# Patient Record
Sex: FEMALE | Race: ASIAN | NOT HISPANIC OR LATINO | Employment: UNEMPLOYED | URBAN - METROPOLITAN AREA
[De-identification: names, ages, dates, MRNs, and addresses within clinical notes are randomized per-mention and may not be internally consistent; named-entity substitution may affect disease eponyms.]

---

## 2017-02-06 DIAGNOSIS — F32.A DEPRESSION, UNSPECIFIED DEPRESSION TYPE: ICD-10-CM

## 2017-02-07 RX ORDER — CITALOPRAM HYDROBROMIDE 20 MG/1
20 TABLET ORAL DAILY
Qty: 60 TABLET | Refills: 0 | Status: SHIPPED | OUTPATIENT
Start: 2017-02-07 | End: 2017-05-08

## 2017-02-07 NOTE — TELEPHONE ENCOUNTER
PHQ-9 SCORE 8/14/2015 1/13/2016   Total Score 10 -   Total Score - 5     Refilled, but she is due for physica. Please have her schedule

## 2017-02-07 NOTE — TELEPHONE ENCOUNTER
Called spoke to patient scheduled for tomorrow 2/8/17 with Lora Garcia.    Thanks  Minor GORMAN  Team Coodinator

## 2017-02-07 NOTE — TELEPHONE ENCOUNTER
citalopram (CELEXA) 20 MG tablet     Last Written Prescription Date: 1/13/16  Per pharm last fill date: 9/12/16  Last Fill Quantity: 90, # refills: 1  Last Office Visit with G primary care provider: 10/25/2016       Last PHQ-9 score on record=   PHQ-9 SCORE 1/13/2016   Total Score -   Total Score 5         MALINI GreenT

## 2017-02-07 NOTE — TELEPHONE ENCOUNTER
Routing refill request to provider for review/approval because:  PHQ 9 >4, recommended 6 month visit 1/16. Please sign if ok.  Sydnee Peters, RN  Triage Nurse

## 2017-05-08 DIAGNOSIS — F32.A DEPRESSION, UNSPECIFIED DEPRESSION TYPE: ICD-10-CM

## 2017-05-08 NOTE — TELEPHONE ENCOUNTER
CITALOPRAM 20MG     Last Written Prescription Date: 2/7/2017  Last Fill Quantity: 60, # refills: 0  Last Office Visit with G primary care provider:  2/15/2017        Last PHQ-9 score on record=   PHQ-9 SCORE 1/13/2016   Total Score -   Total Score 5

## 2017-05-10 RX ORDER — CITALOPRAM HYDROBROMIDE 20 MG/1
20 TABLET ORAL DAILY
Qty: 30 TABLET | Refills: 0 | Status: SHIPPED | OUTPATIENT
Start: 2017-05-10 | End: 2017-08-22

## 2017-05-10 NOTE — TELEPHONE ENCOUNTER
LOV 10-25-16.  No show for 2-15-17 physical.  Please call to schedule appointment(nurse)  Do you want to fill again in the interim?  Kelle Jonas RN

## 2017-08-22 ENCOUNTER — OFFICE VISIT (OUTPATIENT)
Dept: PEDIATRICS | Facility: CLINIC | Age: 22
End: 2017-08-22
Payer: COMMERCIAL

## 2017-08-22 VITALS
DIASTOLIC BLOOD PRESSURE: 54 MMHG | WEIGHT: 96 LBS | OXYGEN SATURATION: 98 % | SYSTOLIC BLOOD PRESSURE: 88 MMHG | TEMPERATURE: 97.9 F | BODY MASS INDEX: 17.66 KG/M2 | HEART RATE: 60 BPM | HEIGHT: 62 IN

## 2017-08-22 DIAGNOSIS — M54.2 CERVICALGIA: ICD-10-CM
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
## 2017-08-22 PROCEDURE — 99214 OFFICE O/P EST MOD 30 MIN: CPT | Performed by: NURSE PRACTITIONER

## 2017-08-22 RX ORDER — CYCLOBENZAPRINE HCL 5 MG
5 TABLET ORAL 3 TIMES DAILY PRN
Qty: 42 TABLET | Refills: 0 | Status: SHIPPED | OUTPATIENT
Start: 2017-08-22 | End: 2017-09-14
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
## 2017-08-22 NOTE — PATIENT INSTRUCTIONS
400 mg ibuprofen THREE times per day with food.     Use the muscle relaxant as needed.     Expect a call to schedule with [facility]

## 2017-09-14 ENCOUNTER — OFFICE VISIT (OUTPATIENT)
Dept: PEDIATRICS | Facility: CLINIC | Age: 22
End: 2017-09-14
Payer: COMMERCIAL

## 2017-09-14 VITALS
SYSTOLIC BLOOD PRESSURE: 86 MMHG | TEMPERATURE: 98.3 F | WEIGHT: 94.2 LBS | DIASTOLIC BLOOD PRESSURE: 56 MMHG | OXYGEN SATURATION: 100 % | BODY MASS INDEX: 17.79 KG/M2 | HEIGHT: 61 IN | HEART RATE: 72 BPM

## 2017-09-14 DIAGNOSIS — J06.9 VIRAL UPPER RESPIRATORY TRACT INFECTION: ICD-10-CM

## 2017-09-14 DIAGNOSIS — T16.1XXA FOREIGN BODY IN EAR, RIGHT, INITIAL ENCOUNTER: Primary | ICD-10-CM

## 2017-09-14 PROCEDURE — 99213 OFFICE O/P EST LOW 20 MIN: CPT | Mod: 25 | Performed by: PHYSICIAN ASSISTANT

## 2017-09-14 PROCEDURE — 69200 CLEAR OUTER EAR CANAL: CPT | Performed by: PHYSICIAN ASSISTANT
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
## 2017-09-14 NOTE — PATIENT INSTRUCTIONS
With distilled water 2-3x per day for a minimum 2-3 days        Viral Upper Respiratory Illness (Adult)  You have a viral upper respiratory illness (URI), which is another term for the common cold. This illness is contagious during the first few days. It is spread through the air by coughing and sneezing. It may also be spread by direct contact (touching the sick person and then touching your own eyes, nose, or mouth). Frequent handwashing will decrease risk of spread. Most viral illnesses go away within 7 to 10 days with rest and simple home remedies. Sometimes the illness may last for several weeks. Antibiotics will not kill a virus, and they are generally not prescribed for this condition.    Home care    If symptoms are severe, rest at home for the first 2 to 3 days. When you resume activity, don't let yourself get too tired.    Avoid being exposed to cigarette smoke (yours or others ).    You may use acetaminophen or ibuprofen to control pain and fever, unless another medicine was prescribed. (Note: If you have chronic liver or kidney disease, have ever had a stomach ulcer or gastrointestinal bleeding, or are taking blood-thinning medicines, talk with your healthcare provider before using these medicines.) Aspirin should never be given to anyone under 18 years of age who is ill with a viral infection or fever. It may cause severe liver or brain damage.    Your appetite may be poor, so a light diet is fine. Avoid dehydration by drinking 6 to 8 glasses of fluids per day (water, soft drinks, juices, tea, or soup). Extra fluids will help loosen secretions in the nose and lungs.    Over-the-counter cold medicines will not shorten the length of time you re sick, but they may be helpful for the following symptoms: cough, sore throat, and nasal and sinus congestion. (Note: Do not use decongestants if you have high blood pressure.)  Follow-up care  Follow up with your healthcare provider, or as advised.  When to seek  "medical advice  Call your healthcare provider right away if any of these occur:    Cough with lots of colored sputum (mucus)    Severe headache; face, neck, or ear pain    Difficulty swallowing due to throat pain    Fever of 100.4 F (38 C)  Call 911, or get immediate medical care  Call emergency services right away if any of these occur:    Chest pain, shortness of breath, wheezing, or difficulty breathing    Coughing up blood    Inability to swallow due to throat pain  Date Last Reviewed: 9/13/2015 2000-2017 [employer]. [address] [place] [address]. All rights reserved. This information is not intended as a substitute for professional medical care. Always follow your healthcare professional's instructions.        Jadwin Diet  Your healthcare provider may recommend a bland diet if you have an upset stomach. It consists of foods that are mild and easy to digest. It is better to eat small frequent meals rather than 3 large meals a day.    Beverages  OK: Fruit juices, non-caffeinated teas and coffee, non-carbonated east  Avoid: Carbonated beverage, caffeinated tea and coffee, all alcoholic beverages  Bread  OK: Refined white, wheat or rye bread, norma or soda crackers, Kym toast, plain rolls, bagels  Avoid: Whole-grain bread  Cereal  OK: Refined cereals: cooked or ready to eat  Avoid: Whole-grain cereals and granola, or those containing bran, seeds or nuts  Desserts  OK: Peanut butter and all others except those to \"avoid\"  Avoid: Chocolate, cocoa, coconut, popcorn, nuts, seeds, jam, marmalade  Fruits  OK: Canned, cooked, frozen or fresh fruits without seeds or tough skin  Avoid: Olives, skin and seeds of fruit  Meats  OK: All fresh or preserved meat, fish and fowl  Avoid: Any that are prepared with those spices to \"avoid\"  Cheese and eggs  OK: Eggs, cottage cheese, cream cheese, other cheeses  Avoid: All cheeses made with those spices to \"avoid\"  Potatoes and pasta  OK: Potato, rice, " "macaroni, noodles, spaghetti  Avoid: None  Soups  OK: All soups without heavy seasoning  Avoid: Soups made with those spices to \"avoid\"  Vegetables  OK: Canned, cooked, fresh or frozen mildly flavored vegetables without seeds, skins or coarse fiber  Avoid: Vegetables prepared with those spices to \"avoid\"; skin and seeds of vegetables and those with coarse fiber  Spices  OK: Salt, lemon and lime juice, vinegar, all extracts, donna, cinnamon, thyme, mace, allspice, paprika  Avoid: Chili powder, cloves, pepper, seed spices, garlic, gravy pickles, highly seasoned salad dressings  Date Last Reviewed: 11/20/2015 2000-2017 [employer]. [address] [place] [address]. All rights reserved. This information is not intended as a substitute for professional medical care. Always follow your healthcare professional's instructions.        "

## 2017-09-14 NOTE — LETTER
[address]  [address]  [facility] [address]  Phone: [phone]  Fax: [phone]    September 14, 2017        Qian Loo  [address] [place] [id number]          To whom it may concern:    RE: Qian Loo    Patient was seen and treated today at our clinic.  I have recommended she stay home on 9/14, and return to work on 9/15/17 if feeling better.    Please contact me for questions or concerns.      Sincerely,        Obdulio Iqbal PA-C

## 2017-09-14 NOTE — MR AVS SNAPSHOT
After Visit Summary   9/14/2017    Qian Loo    MRN: [id number]           Patient Information     Date Of Birth          1995        Visit Information        Provider Department      9/14/2017 10:00 AM Obdulio Iqbal PA-C [facility]        Care Instructions      With distilled water 2-3x per day for a minimum 2-3 days        Viral Upper Respiratory Illness (Adult)  You have a viral upper respiratory illness (URI), which is another term for the common cold. This illness is contagious during the first few days. It is spread through the air by coughing and sneezing. It may also be spread by direct contact (touching the sick person and then touching your own eyes, nose, or mouth). Frequent handwashing will decrease risk of spread. Most viral illnesses go away within 7 to 10 days with rest and simple home remedies. Sometimes the illness may last for several weeks. Antibiotics will not kill a virus, and they are generally not prescribed for this condition.    Home care    If symptoms are severe, rest at home for the first 2 to 3 days. When you resume activity, don't let yourself get too tired.    Avoid being exposed to cigarette smoke (yours or others ).    You may use acetaminophen or ibuprofen to control pain and fever, unless another medicine was prescribed. (Note: If you have chronic liver or kidney disease, have ever had a stomach ulcer or gastrointestinal bleeding, or are taking blood-thinning medicines, talk with your healthcare provider before using these medicines.) Aspirin should never be given to anyone under 18 years of age who is ill with a viral infection or fever. It may cause severe liver or brain damage.    Your appetite may be poor, so a light diet is fine. Avoid dehydration by drinking 6 to 8 glasses of fluids per day (water, soft drinks, juices, tea, or soup). Extra fluids will help loosen secretions in the nose and lungs.    Over-the-counter cold  "medicines will not shorten the length of time you re sick, but they may be helpful for the following symptoms: cough, sore throat, and nasal and sinus congestion. (Note: Do not use decongestants if you have high blood pressure.)  Follow-up care  Follow up with your healthcare provider, or as advised.  When to seek medical advice  Call your healthcare provider right away if any of these occur:    Cough with lots of colored sputum (mucus)    Severe headache; face, neck, or ear pain    Difficulty swallowing due to throat pain    Fever of 100.4 F (38 C)  Call 911, or get immediate medical care  Call emergency services right away if any of these occur:    Chest pain, shortness of breath, wheezing, or difficulty breathing    Coughing up blood    Inability to swallow due to throat pain  Date Last Reviewed: 9/13/2015 2000-2017 [employer]. [address] [place] [address]. All rights reserved. This information is not intended as a substitute for professional medical care. Always follow your healthcare professional's instructions.        [place] Diet  Your healthcare provider may recommend a bland diet if you have an upset stomach. It consists of foods that are mild and easy to digest. It is better to eat small frequent meals rather than 3 large meals a day.    Beverages  OK: Fruit juices, non-caffeinated teas and coffee, non-carbonated east  Avoid: Carbonated beverage, caffeinated tea and coffee, all alcoholic beverages  Bread  OK: Refined white, wheat or rye bread, norma or soda crackers, [place] toast, plain rolls, bagels  Avoid: Whole-grain bread  Cereal  OK: Refined cereals: cooked or ready to eat  Avoid: Whole-grain cereals and granola, or those containing bran, seeds or nuts  Desserts  OK: Peanut butter and all others except those to \"avoid\"  Avoid: Chocolate, cocoa, coconut, popcorn, nuts, seeds, jam, marmalade  Fruits  OK: Canned, cooked, frozen or fresh fruits without seeds or tough " "skin  Avoid: Olives, skin and seeds of fruit  Meats  OK: All fresh or preserved meat, fish and fowl  Avoid: Any that are prepared with those spices to \"avoid\"  Cheese and eggs  OK: Eggs, cottage cheese, cream cheese, other cheeses  Avoid: All cheeses made with those spices to \"avoid\"  Potatoes and pasta  OK: Potato, rice, macaroni, noodles, spaghetti  Avoid: None  Soups  OK: All soups without heavy seasoning  Avoid: Soups made with those spices to \"avoid\"  Vegetables  OK: Canned, cooked, fresh or frozen mildly flavored vegetables without seeds, skins or coarse fiber  Avoid: Vegetables prepared with those spices to \"avoid\"; skin and seeds of vegetables and those with coarse fiber  Spices  OK: Salt, lemon and lime juice, vinegar, all extracts, donna, cinnamon, thyme, mace, allspice, paprika  Avoid: Chili powder, cloves, pepper, seed spices, garlic, gravy pickles, highly seasoned salad dressings  Date Last Reviewed: 11/20/2015 2000-2017 [employer] [address] [place] [address]. All rights reserved. This information is not intended as a substitute for professional medical care. Always follow your healthcare professional's instructions.                Follow-ups after your visit        Who to contact     If you have questions or need follow up information about today's clinic visit or your schedule please contact [facility] directly at [phone].  Normal or non-critical lab and imaging results will be communicated to you by MyChart, letter or phone within 4 business days after the clinic has received the results. If you do not hear from us within 7 days, please contact the clinic through [employer] or phone. If you have a critical or abnormal lab result, we will notify you by phone as soon as possible.  Submit refill requests through [employer] or call your pharmacy and they will forward the refill request to us. Please allow 3 business days for your refill to be completed.          " "Additional Information About Your Visit        MyChart Information     [employer] gives you secure access to your electronic health record. If you see a primary care provider, you can also send messages to your care team and make appointments. If you have questions, please call your primary care clinic.  If you do not have a primary care provider, please call [phone] and they will assist you.        Care EveryWhere ID     This is your Care EveryWhere ID. This could be used by other organizations to access your [facility] medical records  [id number]        Your Vitals Were     Pulse Temperature Height Last Period Pulse Oximetry BMI (Body Mass Index)    72 98.3  F (36.8  C) (Oral) 5' 1.25\" (1.556 m) (LMP Unknown) 100% 17.65 kg/m2       Blood Pressure from Last 3 Encounters:   09/14/17 (!) 86/56   08/22/17 (!) 88/54   10/25/16 (!) 80/60    Weight from Last 3 Encounters:   09/14/17 94 lb 3.2 oz (42.7 kg)   08/22/17 96 lb (43.5 kg)   10/25/16 99 lb 1.6 oz (45 kg)              Today, you had the following     No orders found for display       Primary Care Provider Office Phone # Fax #    VALENTINO Tinsley [phone] [address] [place] [address]        Equal Access to Services     [facility]: Hadii aad ku hadasho Soomaali, waaxda luqadaha, qaybta kaalmada adeegyada, ita nance . So [facility] [phone].    ATENCIÓN: Si habla español, tiene a yanez disposición servicios gratuitos de asistencia lingüística. Llame al [phone].    We comply with applicable federal civil rights laws and [place] laws. We do not discriminate on the basis of race, color, national origin, age, disability sex, sexual orientation or gender identity.            Thank you!     Thank you for choosing [facility]  for your care. Our goal is always to provide you with excellent care. Hearing back from our patients is one way we can continue to improve our " services. Please take a few minutes to complete the written survey that you may receive in the mail after your visit with us. Thank you!             Your Updated Medication List - Protect others around you: Learn how to safely use, store and throw away your medicines at www.disposemymeds.org.          This list is accurate as of: 9/14/17 10:27 AM.  Always use your most recent med list.                   Brand Name Dispense Instructions for use Diagnosis    ZYRTEC ALLERGY PO      Take 5 mg by mouth daily

## 2017-12-13 ENCOUNTER — TELEPHONE (OUTPATIENT)
Dept: PEDIATRICS | Facility: CLINIC | Age: 22
End: 2017-12-13

## 2017-12-13 NOTE — LETTER
February 22, 2018      Qian Loo  [address] [place] [phone]        Dear Qian,       We care about your health and have reviewed your health plan including your medical conditions, medications, and lab results.  Based on this review, it is recommended that you follow up regarding the following health topic(s):  -Chlamydia Screening    We recommend you take the following action(s):  -schedule a LAB ONLY APPOINTMENT to recheck your: Gonorrhea/chlamydia screening. within the next 1-4 weeks.  If' you have had labs completed eslewhere, please let us know so we can update your records.       Please call us at the [facility] - [phone] (or use [employer]) to address the above recommendations.     Thank you for trusting [facility] and we appreciate the opportunity to serve you.  We look forward to supporting your healthcare needs in the future.    Healthy Regards,    Your Health Care Team  [facility]

## 2017-12-13 NOTE — LETTER
January 23, 2018      Qian Loo  [address] [place] [id number]        Dear Qian,       We care about your health and have reviewed your health plan including your medical conditions, medications, and lab results.  Based on this review, it is recommended that you follow up regarding the following health topic(s):  -Chlamydia Screening    We recommend you take the following action(s):  -schedule a LAB ONLY APPOINTMENT to recheck your: chlamydia screening within the next 1-4 weeks.  If' you have had labs completed eslewhere, please let us know so we can update your records.       Please call us at the [facility] - [phone] (or use [email or web address]) to address the above recommendations.     Thank you for trusting [facility] and we appreciate the opportunity to serve you.  We look forward to supporting your healthcare needs in the future.    Healthy Regards,    Your Health Care Team  [facility]

## 2017-12-13 NOTE — LETTER
December 13, 2017      Qian Loo  [address] [place] [phone]        Dear Qian,       We care about your health and have reviewed your health plan including your medical conditions, medications, and lab results.  Based on this review, it is recommended that you follow up regarding the following health topic(s):  -Chlamydia Screening    We recommend you take the following action(s):  -SCHEDULE LAB ONLY APPOINTMENT TO LEAVE SPECIMEN TO SCREEN FOR GONORRHEA/CHLAMYDIA INFECTION.     Please call us at the [facility] - [phone] (or use [employer]) to address the above recommendations.     Thank you for trusting [facility] and we appreciate the opportunity to serve you.  We look forward to supporting your healthcare needs in the future.    Healthy Regards,    Your Health Care Team  [facility]

## 2017-12-13 NOTE — TELEPHONE ENCOUNTER
Panel Management Review          Composite cancer screening  Chart review shows that this patient is due/due soon for the following None  Summary:    Patient is due/failing the following:   GC SCREENING    Action needed:   Patient needs non-fasting lab only appointment    Type of outreach:    Sent letter.    Questions for provider review:    None                                                                                                                                    Ryanne Craig CMA([facility])

## 2018-03-02 ENCOUNTER — OFFICE VISIT (OUTPATIENT)
Dept: PEDIATRICS | Facility: CLINIC | Age: 23
End: 2018-03-02
Payer: COMMERCIAL

## 2018-03-02 VITALS
SYSTOLIC BLOOD PRESSURE: 96 MMHG | BODY MASS INDEX: 17.61 KG/M2 | HEIGHT: 61 IN | OXYGEN SATURATION: 100 % | HEART RATE: 76 BPM | TEMPERATURE: 98.1 F | DIASTOLIC BLOOD PRESSURE: 64 MMHG | WEIGHT: 93.3 LBS

## 2018-03-02 DIAGNOSIS — Z86.19 HX OF CHLAMYDIA INFECTION: Primary | ICD-10-CM

## 2018-03-02 DIAGNOSIS — R11.2 NON-INTRACTABLE VOMITING WITH NAUSEA, UNSPECIFIED VOMITING TYPE: ICD-10-CM

## 2018-03-02 PROCEDURE — 99214 OFFICE O/P EST MOD 30 MIN: CPT | Performed by: PHYSICIAN ASSISTANT

## 2018-03-02 NOTE — PROGRESS NOTES
"  SUBJECTIVE:   Qian Loo is a 22 year old female who presents to clinic today for the following health issues:    Patient was seen at  one week ago with persistent chlamydial infection. Placed on 7 day course of doxy. Finished medicine two days ago.     Patient was also treated one year ago and unsure if symptoms ever went away.     Patient not concerned about any other STDs. Partner was treated last week as well.     She was started on nuvaring a few weeks ago. Patient notes n/v every other day.     ROS:  ROS otherwise negative    OBJECTIVE:                                                    BP 96/64 (BP Location: Right arm, Cuff Size: Adult Regular)  Pulse 76  Temp 98.1  F (36.7  C) (Oral)  Ht 5' 1.25\" (1.556 m)  Wt 93 lb 4.8 oz (42.3 kg)  SpO2 100%  BMI 17.49 kg/m2  Body mass index is 17.49 kg/(m^2).   GENERAL: alert, no distress  RESP: lungs clear to auscultation - no rales, no rhonchi, no wheezes  CV: regular rates and rhythm, normal S1 S2, no S3 or S4 and no murmur, no click or rub  ABDOMEN: soft, no tenderness    Diagnostic test results:  No results found for this or any previous visit (from the past 24 hour(s)).     ASSESSMENT/PLAN:                                                    (Z86.19) Hx of chlamydia infection  (primary encounter diagnosis)  Comment: test for cure in three weeks. Orders placed.   Plan: Chlamydia trachomatis PCR            (R11.2) Non-intractable vomiting with nausea, unspecified vomiting type  Comment: differential includes adverse reaction to doxy or nuvaring. Continue nuvaring and follow up with PMD in three weeks.   Plan:       See Patient Instructions    Dajuan Magana PA-C  [facility]  "

## 2018-03-02 NOTE — MR AVS SNAPSHOT
After Visit Summary   3/2/2018    Qian Loo    MRN: [id number]           Patient Information     Date Of Birth          1995        Visit Information        Provider Department      3/2/2018 10:50 AM Dajuan Magana PA-C [facility]        Today's Diagnoses     Hx of chlamydia infection    -  1    Non-intractable vomiting with nausea, unspecified vomiting type           Follow-ups after your visit        Your next 10 appointments already scheduled     Mar 23, 2018  8:30 AM CDT   SHORT with VALENTINO Tinsley CNP   [facility]    [address]  [address]  [facility] [id number] [phone]              Future tests that were ordered for you today     Open Future Orders        Priority Expected Expires Ordered    Chlamydia trachomatis PCR Routine 3/23/2018 4/20/2018 3/2/2018            Who to contact     If you have questions or need follow up information about today's clinic visit or your schedule please contact [facility] directly at [phone].  Normal or non-critical lab and imaging results will be communicated to you by MyChart, letter or phone within 4 business days after the clinic has received the results. If you do not hear from us within 7 days, please contact the clinic through MyChart or phone. If you have a critical or abnormal lab result, we will notify you by phone as soon as possible.  Submit refill requests through "[employer] or call your pharmacy and they will forward the refill request to us. Please allow 3 business days for your refill to be completed.          Additional Information About Your Visit        MyChart Information     "[employer] gives you secure access to your electronic health record. If you see a primary care provider, you can also send messages to your care team and make appointments. If you have questions, please call your primary care clinic.  If you do not have  "a primary care provider, please call [phone] and they will assist you.        Care EveryWhere ID     This is your Care EveryWhere ID. This could be used by other organizations to access your [facility] medical records  [id number]        Your Vitals Were     Pulse Temperature Height Pulse Oximetry BMI (Body Mass Index)       76 98.1  F (36.7  C) (Oral) 5' 1.25\" (1.556 m) 100% 17.49 kg/m2        Blood Pressure from Last 3 Encounters:   03/02/18 96/64   09/14/17 (!) 86/56   08/22/17 (!) 88/54    Weight from Last 3 Encounters:   03/02/18 93 lb 4.8 oz (42.3 kg)   09/14/17 94 lb 3.2 oz (42.7 kg)   08/22/17 96 lb (43.5 kg)               Primary Care Provider Office Phone # Fax #    Lora VALENTINO Cardenas [phone] [address] [place] [address]        Equal Access to Services     [facility]: Hadii aad ku hadasho Soomaali, waaxda luqadaha, qaybta kaalmada adeegyada, ita nance . So [facility] [phone].    ATENCIÓN: Si habla español, tiene a yanez disposición servicios gratuitos de asistencia lingüística. Llame al [phone].    We comply with applicable federal civil rights laws and [place] laws. We do not discriminate on the basis of race, color, national origin, age, disability, sex, sexual orientation, or gender identity.            Thank you!     Thank you for choosing [facility]  for your care. Our goal is always to provide you with excellent care. Hearing back from our patients is one way we can continue to improve our services. Please take a few minutes to complete the written survey that you may receive in the mail after your visit with us. Thank you!             Your Updated Medication List - Protect others around you: Learn how to safely use, store and throw away your medicines at www.disposemymeds.org.          This list is accurate as of 3/2/18 11:58 AM.  Always use your most recent med list.                   Brand " Name Dispense Instructions for use Diagnosis    ZYRTEC ALLERGY PO      Take 5 mg by mouth daily

## 2018-03-23 ENCOUNTER — OFFICE VISIT (OUTPATIENT)
Dept: PEDIATRICS | Facility: CLINIC | Age: 23
End: 2018-03-23
Payer: COMMERCIAL

## 2018-03-23 VITALS
HEART RATE: 60 BPM | BODY MASS INDEX: 17.58 KG/M2 | TEMPERATURE: 98 F | WEIGHT: 93.8 LBS | DIASTOLIC BLOOD PRESSURE: 65 MMHG | SYSTOLIC BLOOD PRESSURE: 97 MMHG

## 2018-03-23 DIAGNOSIS — A74.9 CHLAMYDIAL INFECTION: ICD-10-CM

## 2018-03-23 DIAGNOSIS — Z30.9 ENCOUNTER FOR CONTRACEPTIVE MANAGEMENT, UNSPECIFIED TYPE: Primary | ICD-10-CM

## 2018-03-23 PROCEDURE — 99213 OFFICE O/P EST LOW 20 MIN: CPT | Performed by: NURSE PRACTITIONER

## 2018-03-23 RX ORDER — ETONOGESTREL AND ETHINYL ESTRADIOL VAGINAL RING .015; .12 MG/D; MG/D
RING VAGINAL
Qty: 3 EACH | Refills: 3 | Status: SHIPPED | OUTPATIENT
Start: 2018-03-23 | End: 2018-10-01

## 2018-03-23 NOTE — PATIENT INSTRUCTIONS
Take the morning after pill today, place a new ring in today and you can replace that in 4 wks and resume your ring schedule.     If you're concerned about pregnancy, you an take a test in 1 month.     In the future, if your ring falls out, as long as it wasn't out >3 hrs, you can rinse it under water, then replace it.     In the future, if it falls out and you have sex, take the morning after pill.     RETURN TO CLINIC in about 1.5 months (beginning of may) to repeat chlamydia tests. You can schedule a lab only to do this.

## 2018-03-23 NOTE — PROGRESS NOTES
SUBJECTIVE:   Qian Loo is a 22 year old female who presents to clinic today for the following health issues    She had new nuvaring placed 1.5 wks ago, and it came out after 7 days (2 days ago) and kept it out. She has had sex since it came out, yesterday. Not using condoms.     Problem list and histories reviewed & adjusted, as indicated.  Additional history: as documented    Patient Active Problem List   Diagnosis     Allergic rhinitis     Other acne     Depression     History reviewed. No pertinent surgical history.    Social History   Substance Use Topics     Smoking status: Never Smoker     Smokeless tobacco: Never Used      Comment: non smoking home     Alcohol use Yes      Comment: 2-3 times per month, 1/2 beer each time     Family History   Problem Relation Age of Onset     Adopted: Yes     Unknown/Adopted Mother      Unknown/Adopted Father      Unknown/Adopted Maternal Grandmother      Unknown/Adopted Maternal Grandfather      Unknown/Adopted Other            Reviewed and updated as needed this visit by clinical staff       Reviewed and updated as needed this visit by Provider         ROS:  Constitutional, HEENT, cardiovascular, pulmonary, gi and gu systems are negative, except as otherwise noted.    OBJECTIVE:     BP 97/65  Pulse 60  Temp 98  F (36.7  C) (Tympanic)  Wt 93 lb 12.8 oz (42.5 kg)  BMI 17.58 kg/m2  Body mass index is 17.58 kg/(m^2).  GENERAL: healthy, alert and no distress  PSYCH: mentation appears normal, affect normal/bright      ASSESSMENT/PLAN:     1. Chlamydial infection  According to CDC, recommended retest in 3 months. Very highly encouraged her to use condoms every time. She notes she has been mutually monogamous for some weeks, but encouraged to use condoms for at least 6 mo, especially with hx of chlam infections. She will return to clinic for lab only in 6 wks to retest again.   - NEISSERIA GONORRHOEA PCR; Future  - CHLAMYDIA TRACHOMATIS PCR; Future    2. Encounter for  contraceptive management, unspecified type  Her ring fell out after being in x 1 wk and she has had sex since it's been out over thepast few days. Instructed to take a plan B today, and replace nuvaring and leave in x 3-4 wks, then replace as she had been. We discussed in the future if it should fall out, she can rinse under water and replace and resume her normal nuvaring schedule.   - etonogestrel-ethinyl estradiol (NUVARING) 0.12-0.015 MG/24HR vaginal ring; Insert 1 ring vaginally every 21 days then remove for 1 week then repeat with new ring.  Dispense: 3 each; Refill: 3    Patient Instructions   Take the morning after pill today, place a new ring in today and you can replace that in 4 wks and resume your ring schedule.     If you're concerned about pregnancy, you an take a test in 1 month.     In the future, if your ring falls out, as long as it wasn't out >3 hrs, you can rinse it under water, then replace it.     In the future, if it falls out and you have sex, take the morning after pill.     RETURN TO CLINIC in about 1.5 months (beginning of may) to repeat chlamydia tests. You can schedule a lab only to do this.         VALENTINO Macias [facility]

## 2018-03-23 NOTE — MR AVS SNAPSHOT
After Visit Summary   3/23/2018    Qian Loo    MRN: [id number]           Patient Information     Date Of Birth          1995        Visit Information        Provider Department      3/23/2018 8:30 AM Lora Garcia APRN [facility]        Today's Diagnoses     Encounter for contraceptive management, unspecified type    -  1    Chlamydial infection          Care Instructions    Take the morning after pill today, place a new ring in today and you can replace that in 4 wks and resume your ring schedule.     If you're concerned about pregnancy, you an take a test in 1 month.     In the future, if your ring falls out, as long as it wasn't out >3 hrs, you can rinse it under water, then replace it.     In the future, if it falls out and you have sex, take the morning after pill.     RETURN TO CLINIC in about 1.5 months (beginning of may) to repeat chlamydia tests. You can schedule a lab only to do this.             Follow-ups after your visit        Follow-up notes from your care team     Return in about 6 weeks (around 5/4/2018) for LAB only appt.      Your next 10 appointments already scheduled     May 04, 2018  9:00 AM CDT   LAB with [facility] LAB   [facility]    [address] [place] [address]  [address]  [facility] [address] [phone]           Please do not eat 10-12 hours before your appointment if you are coming in fasting for labs on lipids, cholesterol, or glucose (sugar). This does not apply to pregnant women. Water, hot tea and black coffee (with nothing added) are okay. Do not drink other fluids, diet soda or chew gum.              Future tests that were ordered for you today     Open Future Orders        Priority Expected Expires Ordered    NEISSERIA GONORRHOEA PCR Routine 5/1/2018 6/23/2018 3/23/2018    CHLAMYDIA TRACHOMATIS PCR Routine 5/1/2018 6/23/2018 3/23/2018            Who to contact     If you have questions or need  follow up information about today's clinic visit or your schedule please contact [facility] directly at [phone].  Normal or non-critical lab and imaging results will be communicated to you by Alexis Bittarhart, letter or phone within 4 business days after the clinic has received the results. If you do not hear from us within 7 days, please contact the clinic through Alexis Bittarhart or phone. If you have a critical or abnormal lab result, we will notify you by phone as soon as possible.  Submit refill requests through [employer] or call your pharmacy and they will forward the refill request to us. Please allow 3 business days for your refill to be completed.          Additional Information About Your Visit        [facility] Information     [employer] gives you secure access to your electronic health record. If you see a primary care provider, you can also send messages to your care team and make appointments. If you have questions, please call your primary care clinic.  If you do not have a primary care provider, please call [phone] and they will assist you.        Care EveryWhere ID     This is your Care EveryWhere ID. This could be used by other organizations to access your [place] medical records  [id number]        Your Vitals Were     Pulse Temperature BMI (Body Mass Index)             60 98  F (36.7  C) (Tympanic) 17.58 kg/m2          Blood Pressure from Last 3 Encounters:   03/23/18 97/65   03/02/18 96/64   09/14/17 (!) 86/56    Weight from Last 3 Encounters:   03/23/18 93 lb 12.8 oz (42.5 kg)   03/02/18 93 lb 4.8 oz (42.3 kg)   09/14/17 94 lb 3.2 oz (42.7 kg)                 Today's Medication Changes          These changes are accurate as of 3/23/18  9:19 AM.  If you have any questions, ask your nurse or doctor.               Start taking these medicines.        Dose/Directions    etonogestrel-ethinyl estradiol 0.12-0.015 MG/24HR vaginal ring   Commonly known as:  NUVARING   Used for:  Encounter for  contraceptive management, unspecified type   Started by:  Lora Garcia, VALENTINO CNP        Insert 1 ring vaginally every 21 days then remove for 1 week then repeat with new ring.   Quantity:  3 each   Refills:  3            Where to get your medicines      These medications were sent to [place] [id number] [facility] - [facility] [address]  [address], [facility] [address]     Phone:  [phone]     etonogestrel-ethinyl estradiol 0.12-0.015 MG/24HR vaginal ring                Primary Care Provider Office Phone # Fax #    VALENTINO Tinsley [phone] [address] [place] [address]        Equal Access to Services     [facility]: Hadii parveen arredondo hadasho Soomaali, waaxda luqadaha, qaybta kaalmada adeegyada, waxkiara nance . So [facility] [phone].    ATENCIÓN: Si habla español, tiene a yanez disposición servicios gratuitos de asistencia lingüística. [facility] [phone].    We comply with applicable federal civil rights laws and [place] laws. We do not discriminate on the basis of race, color, national origin, age, disability, sex, sexual orientation, or gender identity.            Thank you!     Thank you for choosing [facility]  for your care. Our goal is always to provide you with excellent care. Hearing back from our patients is one way we can continue to improve our services. Please take a few minutes to complete the written survey that you may receive in the mail after your visit with us. Thank you!             Your Updated Medication List - Protect others around you: Learn how to safely use, store and throw away your medicines at www.disposemymeds.org.          This list is accurate as of 3/23/18  9:19 AM.  Always use your most recent med list.                   Brand Name Dispense Instructions for use Diagnosis    etonogestrel-ethinyl estradiol 0.12-0.015 MG/24HR vaginal ring    NUVARING    3 each    Insert 1  ring vaginally every 21 days then remove for 1 week then repeat with new ring.    Encounter for contraceptive management, unspecified type       ZYRTEC ALLERGY PO      Take 5 mg by mouth daily

## 2018-06-13 ENCOUNTER — OFFICE VISIT (OUTPATIENT)
Dept: PEDIATRICS | Facility: CLINIC | Age: 23
End: 2018-06-13
Payer: COMMERCIAL

## 2018-06-13 VITALS
OXYGEN SATURATION: 98 % | DIASTOLIC BLOOD PRESSURE: 60 MMHG | SYSTOLIC BLOOD PRESSURE: 96 MMHG | HEIGHT: 61 IN | BODY MASS INDEX: 18.82 KG/M2 | TEMPERATURE: 97.9 F | HEART RATE: 64 BPM | WEIGHT: 99.7 LBS

## 2018-06-13 DIAGNOSIS — O20.9 VAGINAL BLEEDING BEFORE 22 WEEKS GESTATION: ICD-10-CM

## 2018-06-13 DIAGNOSIS — Z34.90 PREGNANCY, UNSPECIFIED GESTATIONAL AGE: Primary | ICD-10-CM

## 2018-06-13 LAB — BETA HCG QUAL IFA URINE: POSITIVE

## 2018-06-13 PROCEDURE — 84703 CHORIONIC GONADOTROPIN ASSAY: CPT | Performed by: INTERNAL MEDICINE

## 2018-06-13 PROCEDURE — 99213 OFFICE O/P EST LOW 20 MIN: CPT | Mod: GE | Performed by: INTERNAL MEDICINE

## 2018-06-13 NOTE — PROGRESS NOTES
SUBJECTIVE:   Qian Loo is a 22 year old female who presents to clinic today for the following health issues:    Nausea, emesis, abdominal cramping, missed period      Duration: ~1 month    Description (location/character/radiation): N&V, fatigue, breast tenderness, abdominal cramping    Intensity:  moderate    Accompanying signs and symptoms: none    History (similar episodes/previous evaluation): None    Precipitating or alleviating factors: possible pregnancy    Therapies tried and outcome: None     Took a home pregnancy test a few days ago and it was positive.   Last period was around end of March/beginning of April 2018 (doesn't know exact date). Knows it was about a week after she came to clinic last (documented visit 3/23/18).    Has had some vaginal bleeding that started a few days ago. Passing small clots.   Also having some cramping. Cramps aren't as bad as a regular period. No abdominal pain at rest.     Problem list and histories reviewed & adjusted, as indicated.  Additional history: as documented    Patient Active Problem List   Diagnosis     Allergic rhinitis     Other acne     Depression     History reviewed. No pertinent surgical history.    Social History   Substance Use Topics     Smoking status: Never Smoker     Smokeless tobacco: Never Used      Comment: non smoking home     Alcohol use Yes      Comment: 2-3 times per month, 1/2 beer each time     Family History   Problem Relation Age of Onset     Adopted: Yes     Unknown/Adopted Mother      Unknown/Adopted Father      Unknown/Adopted Maternal Grandmother      Unknown/Adopted Maternal Grandfather      Unknown/Adopted Other          Current Outpatient Prescriptions   Medication Sig Dispense Refill     Cetirizine HCl (ZYRTEC ALLERGY PO) Take 5 mg by mouth daily       etonogestrel-ethinyl estradiol (NUVARING) 0.12-0.015 MG/24HR vaginal ring Insert 1 ring vaginally every 21 days then remove for 1 week then repeat with new ring. (Patient  "not taking: Reported on 6/13/2018) 3 each 3     Allergies   Allergen Reactions     No Known Drug Allergies      Seasonal Allergies      BP Readings from Last 3 Encounters:   06/13/18 96/60   03/23/18 97/65   03/02/18 96/64    Wt Readings from Last 3 Encounters:   06/13/18 99 lb 11.2 oz (45.2 kg)   03/23/18 93 lb 12.8 oz (42.5 kg)   03/02/18 93 lb 4.8 oz (42.3 kg)        Reviewed and updated as needed this visit by clinical staff  Tobacco  Allergies  Meds  Problems  Med Hx  Surg Hx  Fam Hx  Soc Hx        Reviewed and updated as needed this visit by Provider  Allergies  Meds  Problems         ROS:  Constitutional, HEENT, cardiovascular, pulmonary, gi and gu systems are negative, except as otherwise noted.    OBJECTIVE:   BP 96/60 (BP Location: Right arm, Patient Position: Sitting, Cuff Size: Adult Regular)  Pulse 64  Temp 97.9  F (36.6  C) (Tympanic)  Ht 5' 1.25\" (1.556 m)  Wt 99 lb 11.2 oz (45.2 kg)  SpO2 98%  BMI 18.68 kg/m2  Body mass index is 18.68 kg/(m^2).  GENERAL: healthy, alert and no distress. Non-toxic  EYES: Eyes grossly normal to inspection, conjunctivae and sclerae normal  RESP: lungs clear to auscultation - no rales, rhonchi or wheezes. Normal respiratory rate and effort.  CV: regular rate and rhythm, normal S1 S2, no S3 or S4, no murmur, click or rub, no peripheral edema and peripheral pulses strong  ABDOMEN: soft, nontender, no hepatosplenomegaly, no masses and bowel sounds normal. Unable to palate uterine margin.  PSYCH: mentation appears normal, affect normal/bright    Diagnostic Test Results:  Results for orders placed or performed in visit on 06/13/18 (from the past 24 hour(s))   Beta HCG qual IFA urine   Result Value Ref Range    Beta HCG Qual IFA Urine Positive (A) NEG^Negative        ASSESSMENT/PLAN:   1. Pregnancy, unspecified gestational age  2. Vaginal bleeding before 22 weeks gestation  We confirmed the pregnancy today and she and the father of the baby already discussed " that although this pregnancy was planned, they want to continue the pregnancy.   Regarding bleeding- I suspect that this is not implantation bleeding. This could be the beginning signs of a miscarriage. I have lower suspicion for an ectopic pregnancy or a life-threatening situation based on her vital signs.   We discussed that she should have an ultrasound sooner rather than nlater  - Beta HCG qual IFA urine  - US OB < 14 Weeks Single; Future  -Recommended starting a prenatal vitamin  -Provided instructions on when to go to the ER    Follow-up: prenatal visit or sooner depending on bleeding    Elke Morrow MD  [facility]    Staff note:  I discussed this case in depth with Dr. Morrow and agree with the key components of the history, assessment and plan.      Lucia Peterson MD  Internal Medicine/Pediatrics

## 2018-06-13 NOTE — MR AVS SNAPSHOT
After Visit Summary   6/13/2018    Qian Loo    MRN: [id number]           Patient Information     Date Of Birth          1995        Visit Information        Provider Department      6/13/2018 11:30 AM Elke Morrow MD [facility]        Today's Diagnoses     Pregnancy, unspecified gestational age    -  1      Care Instructions    1. The pregnancy test was positive. Please schedule with OBGYN on your way out.    Please start a prenatal vitamin once daily- make sure it contains iron.     2. I've ordered an ultrasound for you to make sure this bleeding is not more worrisome. If the bleeding gets much heavier, please go to the ER to be evaluated.  Here is the number for [facility] Radiology. Please call [phone] to have this scheduled.    It was a pleasure to see you today!    Dr. Elke Morrow            Follow-ups after your visit        Future tests that were ordered for you today     Open Future Orders        Priority Expected Expires Ordered    US OB < 14 Weeks Single Routine  6/13/2019 6/13/2018            Who to contact     If you have questions or need follow up information about today's clinic visit or your schedule please contact [facility] directly at [phone].  Normal or non-critical lab and imaging results will be communicated to you by MyChart, letter or phone within 4 business days after the clinic has received the results. If you do not hear from us within 7 days, please contact the clinic through TranscribeMehart or phone. If you have a critical or abnormal lab result, we will notify you by phone as soon as possible.  Submit refill requests through [employer] or call your pharmacy and they will forward the refill request to us. Please allow 3 business days for your refill to be completed.          Additional Information About Your Visit        TranscribeMehart Information     [employer] gives you secure access to your electronic health record. If you see a  "primary care provider, you can also send messages to your care team and make appointments. If you have questions, please call your primary care clinic.  If you do not have a primary care provider, please call [phone] and they will assist you.        Care EveryWhere ID     This is your Care EveryWhere ID. This could be used by other organizations to access your [place] medical records  [id number]        Your Vitals Were     Pulse Temperature Height Pulse Oximetry BMI (Body Mass Index)       64 97.9  F (36.6  C) (Tympanic) 5' 1.25\" (1.556 m) 98% 18.68 kg/m2        Blood Pressure from Last 3 Encounters:   06/13/18 96/60   03/23/18 97/65   03/02/18 96/64    Weight from Last 3 Encounters:   06/13/18 99 lb 11.2 oz (45.2 kg)   03/23/18 93 lb 12.8 oz (42.5 kg)   03/02/18 93 lb 4.8 oz (42.3 kg)              We Performed the Following     Beta HCG qual IFA urine        Primary Care Provider Office Phone # Fax #    VALENTINO Tinsley [phone] [address] [place] [address]        Equal Access to Services     [facility] : Hadii aad ku hadasho Soomaali, waaxda luqadaha, qaybta kaalmada adeegyada, waxay pedro hayfanyn pao nance . So [facility] [phone].    ATENCIÓN: Si habla español, tiene a yanez disposición servicios gratuitos de asistencia lingüística. Llame al [phone].    We comply with applicable federal civil rights laws and [place] laws. We do not discriminate on the basis of race, color, national origin, age, disability, sex, sexual orientation, or gender identity.            Thank you!     Thank you for choosing [facility]  for your care. Our goal is always to provide you with excellent care. Hearing back from our patients is one way we can continue to improve our services. Please take a few minutes to complete the written survey that you may receive in the mail after your visit with us. Thank you!             Your Updated Medication List " - Protect others around you: Learn how to safely use, store and throw away your medicines at www.disposemymeds.org.          This list is accurate as of 6/13/18 12:26 PM.  Always use your most recent med list.                   Brand Name Dispense Instructions for use Diagnosis    etonogestrel-ethinyl estradiol 0.12-0.015 MG/24HR vaginal ring    NUVARING    3 each    Insert 1 ring vaginally every 21 days then remove for 1 week then repeat with new ring.    Encounter for contraceptive management, unspecified type       ZYRTEC ALLERGY PO      Take 5 mg by mouth daily

## 2018-06-13 NOTE — PATIENT INSTRUCTIONS
1. The pregnancy test was positive. Please schedule with OBGYN on your way out.    Please start a prenatal vitamin once daily- make sure it contains iron.     2. I've ordered an ultrasound for you to make sure this bleeding is not more worrisome. If the bleeding gets much heavier, please go to the ER to be evaluated.  Here is the number for [facility] Radiology. Please call [phone] to have this scheduled.    It was a pleasure to see you today!    Dr. Elke Anguianoon

## 2018-06-14 ENCOUNTER — HOSPITAL ENCOUNTER (OUTPATIENT)
Dept: ULTRASOUND IMAGING | Facility: CLINIC | Age: 23
Discharge: HOME | End: 2018-06-14
Attending: INTERNAL MEDICINE | Admitting: INTERNAL MEDICINE
Payer: COMMERCIAL

## 2018-06-14 DIAGNOSIS — Z34.90 PREGNANCY, UNSPECIFIED GESTATIONAL AGE: ICD-10-CM

## 2018-06-14 PROCEDURE — 76801 OB US < 14 WKS SINGLE FETUS: CPT

## 2018-06-15 ENCOUNTER — TELEPHONE (OUTPATIENT)
Dept: PEDIATRICS | Facility: CLINIC | Age: 23
End: 2018-06-15

## 2018-06-15 ENCOUNTER — HOSPITAL ENCOUNTER (EMERGENCY)
Facility: CLINIC | Age: 23
Discharge: HOME | End: 2018-06-15
Attending: NURSE PRACTITIONER | Admitting: NURSE PRACTITIONER
Payer: COMMERCIAL

## 2018-06-15 VITALS
SYSTOLIC BLOOD PRESSURE: 106 MMHG | DIASTOLIC BLOOD PRESSURE: 79 MMHG | OXYGEN SATURATION: 99 % | TEMPERATURE: 98.6 F | RESPIRATION RATE: 16 BRPM

## 2018-06-15 DIAGNOSIS — O03.9 MISCARRIAGE: Primary | ICD-10-CM

## 2018-06-15 DIAGNOSIS — O03.9 SPONTANEOUS ABORTION: ICD-10-CM

## 2018-06-15 DIAGNOSIS — N93.9 VAGINA BLEEDING: ICD-10-CM

## 2018-06-15 LAB
ABO + RH BLD: NORMAL
ABO + RH BLD: NORMAL
B-HCG SERPL-ACNC: 62 IU/L (ref 0–5)
ERYTHROCYTE [DISTWIDTH] IN BLOOD BY AUTOMATED COUNT: 14.5 % (ref 10–15)
HCT VFR BLD AUTO: 38.5 % (ref 35–47)
HGB BLD-MCNC: 12.3 G/DL (ref 11.7–15.7)
MCH RBC QN AUTO: 27 PG (ref 26.5–33)
MCHC RBC AUTO-ENTMCNC: 31.9 G/DL (ref 31.5–36.5)
MCV RBC AUTO: 84 FL (ref 78–100)
PLATELET # BLD AUTO: 360 10E9/L (ref 150–450)
RBC # BLD AUTO: 4.56 10E12/L (ref 3.8–5.2)
SPECIMEN EXP DATE BLD: NORMAL
WBC # BLD AUTO: 6.6 10E9/L (ref 4–11)

## 2018-06-15 PROCEDURE — 99283 EMERGENCY DEPT VISIT LOW MDM: CPT

## 2018-06-15 PROCEDURE — 84702 CHORIONIC GONADOTROPIN TEST: CPT | Performed by: NURSE PRACTITIONER

## 2018-06-15 PROCEDURE — 86900 BLOOD TYPING SEROLOGIC ABO: CPT | Performed by: NURSE PRACTITIONER

## 2018-06-15 PROCEDURE — 85027 COMPLETE CBC AUTOMATED: CPT | Performed by: NURSE PRACTITIONER

## 2018-06-15 PROCEDURE — 86901 BLOOD TYPING SEROLOGIC RH(D): CPT | Performed by: NURSE PRACTITIONER

## 2018-06-15 PROCEDURE — 36415 COLL VENOUS BLD VENIPUNCTURE: CPT | Performed by: NURSE PRACTITIONER

## 2018-06-15 ASSESSMENT — ENCOUNTER SYMPTOMS
NAUSEA: 0
CHILLS: 0
FREQUENCY: 0
ABDOMINAL PAIN: 1
VOMITING: 0
FEVER: 0

## 2018-06-15 NOTE — ED AVS SNAPSHOT
[facility] Emergency Department    [address]    [facility] [address]    Phone:  [phone]    Fax:  [phone]                                       Qian Loo   MRN: [id number]    Department:  [facility] Emergency Department   Date of Visit:  6/15/2018           After Visit Summary Signature Page     I have received my discharge instructions, and my questions have been answered. I have discussed any challenges I see with this plan with the nurse or doctor.    ..........................................................................................................................................  Patient/Patient Representative Signature      ..........................................................................................................................................  Patient Representative Print Name and Relationship to Patient    ..................................................               ................................................  Date                                            Time    ..........................................................................................................................................  Reviewed by Signature/Title    ...................................................              ..............................................  Date                                                            Time

## 2018-06-15 NOTE — ED AVS SNAPSHOT
[facility] Emergency Department    [address]    [facility] [address]    Phone:  [phone]    Fax:  [phone]                                       Qian Loo   MRN: [id number]    Department:  [facility] Emergency Department   Date of Visit:  6/15/2018           Patient Information     Date Of Birth          1995        Your diagnoses for this visit were:     Vagina bleeding     Spontaneous         You were seen by Shaun Patrick, VALENTINO CNP.      Follow-up Information     Follow up with your OB Monday for repeat lab draw.        Follow up with [facility] Emergency Department.    Specialty:  EMERGENCY MEDICINE    Why:  If symptoms worsen    Contact information:    [address]  [facility] [address] [phone]      Discharge References/Attachments     MISCARRIAGE, DISCHARGE INSTRUCTIONS (ENGLISH)      24 Hour Appointment Hotline       To make an appointment at any [facility] clinic, call [phone] ([phone]). If you don't have a family doctor or clinic, we will help you find one. [facility] clinics are conveniently located to serve the needs of you and your family.             Review of your medicines      Our records show that you are taking the medicines listed below. If these are incorrect, please call your family doctor or clinic.        Dose / Directions Last dose taken    etonogestrel-ethinyl estradiol 0.12-0.015 MG/24HR vaginal ring   Commonly known as:  NUVARING   Quantity:  3 each        Insert 1 ring vaginally every 21 days then remove for 1 week then repeat with new ring.   Refills:  3        ZYRTEC ALLERGY PO   Dose:  5 mg        Take 5 mg by mouth daily   Refills:  0                Procedures and tests performed during your visit     ABO and Rh    CBC (platelets, no diff)    HCG QUANTitative pregnancy (blood)      Orders Needing Specimen Collection     None      Pending Results     No orders  found from 6/13/2018 to 6/16/2018.            Pending Culture Results     No orders found from 6/13/2018 to 6/16/2018.            Pending Results Instructions     If you had any lab results that were not finalized at the time of your Discharge, you can call the ED Lab Result RN at [phone]. You will be contacted by this team for any positive Lab results or changes in treatment. The nurses are available 7 days a week from 10A to 6:30P.  You can leave a message 24 hours per day and they will return your call.        Test Results From Your Hospital Stay        6/15/2018  8:15 PM      Component Results     Component Value Ref Range & Units Status    WBC 6.6 4.0 - 11.0 10e9/L Final    RBC Count 4.56 3.8 - 5.2 10e12/L Final    Hemoglobin 12.3 11.7 - 15.7 g/dL Final    Hematocrit 38.5 35.0 - 47.0 % Final    MCV 84 78 - 100 fl Final    MCH 27.0 26.5 - 33.0 pg Final    MCHC 31.9 31.5 - 36.5 g/dL Final    RDW 14.5 10.0 - 15.0 % Final    Platelet Count 360 150 - 450 10e9/L Final         6/15/2018  8:35 PM      Component Results     Component Value Ref Range & Units Status    HCG Quantitative Serum 62 (H) 0 - 5 IU/L Final         6/15/2018  8:40 PM      Component Results     Component    ABO    B    RH(D)    Pos    Specimen Expires    06/18/2018                Clinical Quality Measure: Blood Pressure Screening     Your blood pressure was checked while you were in the emergency department today. The last reading we obtained was  BP: 106/79 . Please read the guidelines below about what these numbers mean and what you should do about them.  If your systolic blood pressure (the top number) is less than 120 and your diastolic blood pressure (the bottom number) is less than 80, then your blood pressure is normal. There is nothing more that you need to do about it.  If your systolic blood pressure (the top number) is 120-139 or your diastolic blood pressure (the bottom number) is 80-89, your blood pressure may be higher than it  should be. You should have your blood pressure rechecked within a year by a primary care provider.  If your systolic blood pressure (the top number) is 140 or greater or your diastolic blood pressure (the bottom number) is 90 or greater, you may have high blood pressure. High blood pressure is treatable, but if left untreated over time it can put you at risk for heart attack, stroke, or kidney failure. You should have your blood pressure rechecked by a primary care provider within the next 4 weeks.  If your provider in the emergency department today gave you specific instructions to follow-up with your doctor or provider even sooner than that, you should follow that instruction and not wait for up to 4 weeks for your follow-up visit.        Thank you for choosing [facility]       Thank you for choosing [facility] for your care. Our goal is always to provide you with excellent care. Hearing back from our patients is one way we can continue to improve our services. Please take a few minutes to complete the written survey that you may receive in the mail after you visit with us. Thank you!        Provider[employer] Information     [employer] gives you secure access to your electronic health record. If you see a primary care provider, you can also send messages to your care team and make appointments. If you have questions, please call your primary care clinic.  If you do not have a primary care provider, please call [phone] and they will assist you.        Care EveryWhere ID     This is your Care EveryWhere ID. This could be used by other organizations to access your [place] medical records  [id number]        Equal Access to Services     [facility] MCMAHON : Hadii parveen quintero Sotamar, waaxda luqadaha, qaybta kaalmada benita, ita nance . So [facility] [phone].    ATENCIÓN: Si habla español, tiene a yanez disposición servicios gratuitos de asistencia lingüística. Llame al [phone].    We comply with  applicable federal civil rights laws and [place] laws. We do not discriminate on the basis of race, color, national origin, age, disability, sex, sexual orientation, or gender identity.            After Visit Summary       This is your record. Keep this with you and show to your community pharmacist(s) and doctor(s) at your next visit.

## 2018-06-15 NOTE — TELEPHONE ENCOUNTER
Met with Ms. Loo in clinic on Wednesday 6/13 at which point she was unsure of her last period but thought it was around end of March/beginning of April.   Had a home pregnancy test that was positive and test in clinic was positive as well.   Ms. Loo at that time was reporting intermittent spotting that seemed similar to a period and would wax and wane. She was not concerned about it being really heavy. Not feeling lightheaded or dizzy.     Ultrasound ordered and was performed yesterday Thursday 6/14. Results showed no intrauterine gestational sac nor extrauterine sac.   Spoke to Dr. Garay, on call OBGYN who recommended lab draw today at hospital lab and serial hcg quantitative measurements.   Since she is not sure about her dates, this could be an earlier pregnancy than previously thought or it could be a miscarriage. Only way to know for certain is trending quantitative beta hcg.    Spoke to Ms. Loo today about her ultrasound results and recommended that she proceed to the [facility] lab as soon as possible. Ordered future type and screen and beta hcg (quant) which will be run STAT. If lab is closed, asked Ms. Loo to ER for this blood draw.   Discussed that she will need to repeat the blood draw on Sunday 6/17 to know if this is a miscarriage or not. She is disappointed.   She said the bleeding that started Monday has basically resolved. She did endorse passing some small blood clots but didn't recall passing tissue.     Elke Morrow MD  Internal Medicine-Pediatrics Resident, PGY4
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
## 2018-06-16 NOTE — ED TRIAGE NOTES
Pt normally has irregular periods.  Had a positive home pregnancy test on 6/9.  Started having vaginal bleeding on 6/10.  Saw an OB/GYN on Tuesday; no blood work was done.  Ultrasound was completed yesterday and did not show a pregnanct; pt's OB/GYN referred pt to ED to have a blood HCG level drawn.   Pt states she is still bleeding, cramping has been improving.  No clots.

## 2018-06-17 NOTE — PROGRESS NOTES
Ms. Loo,     Here are the ultrasound results which we discussed over the phone yesterday. I saw that you were seen in the emergency department yesterday and that you had your blood drawn. Please follow-up with us in clinic on Monday to have your blood drawn (so we can see that blood level again) and to recheck that you're feeling okay/to discuss next steps.  If you want, you could get your blood drawn in the morning and have a clinic appointment in the afternoon to be able to discuss the results.     I'm so sorry that you're having to deal with this tough situation.     Sincerely,   Dr. Elke Morrow

## 2018-06-18 NOTE — TELEPHONE ENCOUNTER
Patient seen in ED and and had low hcg on 6/15 consistent with miscarriage. Should repeat lab and make sure down trending. Please touch base with patient and help her schedule a lab visit.    Thanks,  Lucia Peterson MD  Internal Medicine/Pediatrics

## 2018-06-18 NOTE — TELEPHONE ENCOUNTER
I spoke with patient and she states that she is feeling better.  No vaginal bleeding.  Small amount of cramping.  Is out of town until Friday.  Will go to [facility] lab on Friday when she returns for a redraw.  No further questions.  Will call back if any other questions or concerns.  SMITA Billingsley RN      [phone]-Dr. Nicholas pena.   OK to wait for recheck on Friday and to monitor symptoms.  SMITA Billingsley RN

## 2018-06-22 DIAGNOSIS — O03.9 MISCARRIAGE: ICD-10-CM

## 2018-06-22 PROCEDURE — 36415 COLL VENOUS BLD VENIPUNCTURE: CPT | Performed by: INTERNAL MEDICINE

## 2018-06-22 PROCEDURE — 84702 CHORIONIC GONADOTROPIN TEST: CPT | Performed by: INTERNAL MEDICINE

## 2018-06-23 LAB — B-HCG SERPL-ACNC: 8 IU/L (ref 0–5)

## 2018-06-26 ENCOUNTER — TELEPHONE (OUTPATIENT)
Dept: PEDIATRICS | Facility: CLINIC | Age: 23
End: 2018-06-26

## 2018-06-26 ENCOUNTER — OFFICE VISIT (OUTPATIENT)
Dept: URGENT CARE | Facility: URGENT CARE | Age: 23
End: 2018-06-26
Payer: COMMERCIAL

## 2018-06-26 VITALS
BODY MASS INDEX: 18.55 KG/M2 | TEMPERATURE: 98 F | SYSTOLIC BLOOD PRESSURE: 92 MMHG | HEART RATE: 76 BPM | WEIGHT: 99 LBS | OXYGEN SATURATION: 100 % | DIASTOLIC BLOOD PRESSURE: 62 MMHG

## 2018-06-26 DIAGNOSIS — O03.9 SPONTANEOUS ABORTION: ICD-10-CM

## 2018-06-26 DIAGNOSIS — N93.9 VAGINAL BLEEDING: Primary | ICD-10-CM

## 2018-06-26 DIAGNOSIS — R42 DIZZINESS: ICD-10-CM

## 2018-06-26 LAB — HGB BLD-MCNC: 11.5 G/DL (ref 11.7–15.7)

## 2018-06-26 PROCEDURE — 85018 HEMOGLOBIN: CPT | Performed by: FAMILY MEDICINE

## 2018-06-26 PROCEDURE — 99214 OFFICE O/P EST MOD 30 MIN: CPT | Performed by: FAMILY MEDICINE

## 2018-06-26 PROCEDURE — 36415 COLL VENOUS BLD VENIPUNCTURE: CPT | Performed by: FAMILY MEDICINE
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
## 2018-06-26 NOTE — MR AVS SNAPSHOT
After Visit Summary   2018    Qian Loo    MRN: [id number]           Patient Information     Date Of Birth          1995        Visit Information        Provider Department      2018 4:00 PM Tova Lazo MD [facility] [place]        Today's Diagnoses     Vaginal bleeding    -  1    Dizziness        Spontaneous            Follow-ups after your visit        Who to contact     If you have questions or need follow up information about today's clinic visit or your schedule please contact [facility] directly at [phone].  Normal or non-critical lab and imaging results will be communicated to you by Quietymehart, letter or phone within 4 business days after the clinic has received the results. If you do not hear from us within 7 days, please contact the clinic through Cloud Takeofft or phone. If you have a critical or abnormal lab result, we will notify you by phone as soon as possible.  Submit refill requests through Depositphotos or call your pharmacy and they will forward the refill request to us. Please allow 3 business days for your refill to be completed.          Additional Information About Your Visit        MyChart Information     Depositphotos gives you secure access to your electronic health record. If you see a primary care provider, you can also send messages to your care team and make appointments. If you have questions, please call your primary care clinic.  If you do not have a primary care provider, please call [phone] and they will assist you.        Care EveryWhere ID     This is your Care EveryWhere ID. This could be used by other organizations to access your [place] medical records  [id number]        Your Vitals Were     Pulse Temperature Pulse Oximetry BMI (Body Mass Index)          76 98  F (36.7  C) (Tympanic) 100% 18.55 kg/m2         Blood Pressure from Last 3 Encounters:   18 92/62   06/15/18 106/79   18 96/60    Weight from  Last 3 Encounters:   06/26/18 99 lb (44.9 kg)   06/13/18 99 lb 11.2 oz (45.2 kg)   03/23/18 93 lb 12.8 oz (42.5 kg)              We Performed the Following     Hemoglobin        Primary Care Provider Office Phone # Fax #    VALENTINO Tinsley [phone] [address] [place] [address]        Equal Access to Services     [facility]: Hadii aad ku hadasho Soomaali, waaxda luqadaha, qaybta kaalmada adeegyada, waxay idiin hayaan adeeg kharash la'aan . So [facility] [phone].    ATENCIÓN: Si alexandrala esprobby, tiene a yanez disposición servicios gratuitos de asistencia lingüística. [facility] [phone].    We comply with applicable federal civil rights laws and [place] laws. We do not discriminate on the basis of race, color, national origin, age, disability, sex, sexual orientation, or gender identity.            Thank you!     Thank you for choosing [facility]  for your care. Our goal is always to provide you with excellent care. Hearing back from our patients is one way we can continue to improve our services. Please take a few minutes to complete the written survey that you may receive in the mail after your visit with us. Thank you!             Your Updated Medication List - Protect others around you: Learn how to safely use, store and throw away your medicines at www.disposemymeds.org.          This list is accurate as of 6/26/18  7:07 PM.  Always use your most recent med list.                   Brand Name Dispense Instructions for use Diagnosis    etonogestrel-ethinyl estradiol 0.12-0.015 MG/24HR vaginal ring    NUVARING    3 each    Insert 1 ring vaginally every 21 days then remove for 1 week then repeat with new ring.    Encounter for contraceptive management, unspecified type       ZYRTEC ALLERGY PO      Take 5 mg by mouth daily

## 2018-06-26 NOTE — LETTER
[facility]  [address]  [address]  [place] [address]  [phone]      June 26, 2018    RE:  Qian Loo                                                                                                                          To whom it may concern:    Qian DHALIWAL Loo is under my professional care for a medical condition that required a visit to urgent care today.  Please excuse her absence from work due to this condition.  She will be able to attend her shift as scheduled on Wednesday, June 27, 2018.    Sincerely,        Tova Lazo    [facility]

## 2018-06-26 NOTE — TELEPHONE ENCOUNTER
Called patient to help her schedule with Gyn and patient was already in our clinic building coming in to be seen at urgent care because her bleeding has increased and is heavy.

## 2018-06-26 NOTE — TELEPHONE ENCOUNTER
"Pt notes that bleeding has been \"very heavy\" over the past couple of days with clotting and cramping.  She notes some dizziness and nausea associated with this. She denies syncope, or going through more than one pad/tampon/hr.   I'd like to have her see a gyn this week to help determine if this is normal bleeding with a miscarriage. Please call her today and see if someone can see her in the next couple of days.    Please let me know if you have questions or concerns.    Stacy Duval MD    "

## 2018-09-27 NOTE — PROGRESS NOTES
SUBJECTIVE:   Qian Loo is a 23 year old female who presents to clinic today for the following health issues:    Patient is here today to discuss BC options.     Patient tried Nuvaring in the past (got pregnant off this).   Tried Mirena (too much cramping).   And doesn't remember to take pills on regular basis, wondering what options are.         Problem list and histories reviewed & adjusted, as indicated.  Additional history: as documented    Patient Active Problem List   Diagnosis     Allergic rhinitis     Other acne     Depression     History reviewed. No pertinent surgical history.    Social History   Substance Use Topics     Smoking status: Never Smoker     Smokeless tobacco: Never Used      Comment: non smoking home     Alcohol use Yes      Comment: 2-3 times per month, 1/2 beer each time     Family History   Problem Relation Age of Onset     Adopted: Yes     Unknown/Adopted Mother      Unknown/Adopted Father      Unknown/Adopted Maternal Grandmother      Unknown/Adopted Maternal Grandfather      Unknown/Adopted Other          Current Outpatient Prescriptions   Medication Sig Dispense Refill     Cetirizine HCl (ZYRTEC ALLERGY PO) Take 5 mg by mouth daily       desogestrel-ethinyl estradiol (APRI) 0.15-30 MG-MCG per tablet Take 1 tablet by mouth daily 84 tablet 3     Allergies   Allergen Reactions     No Known Drug Allergies      Seasonal Allergies        Reviewed and updated as needed this visit by clinical staff       Reviewed and updated as needed this visit by Provider         ROS:  All other systems on a 10-point review are negative, unless otherwise noted in HPI      OBJECTIVE:     BP 94/58 (BP Location: Right arm, Patient Position: Chair)  Pulse 82  Temp 98.5  F (36.9  C) (Oral)  Wt 101 lb 1.6 oz (45.9 kg)  LMP 09/18/2018 (Approximate)  SpO2 97%  BMI 18.95 kg/m2  Body mass index is 18.95 kg/(m^2).  GENERAL: healthy, alert and no distress  EYES: Eyes grossly normal to inspection  NECK: no  asymmetry, masses, or scars  MS: no gross musculoskeletal defects noted, no edema  SKIN: no suspicious lesions or rashes  NEURO: mentation intact and speech normal  PSYCH: mentation appears normal and affect normal/bright    Diagnostic Test Results:  Urine pregnancy neg    ASSESSMENT/PLAN:       1. Encounter for initial prescription of contraceptive pills  Ok to start after negative pregnancy test.  Discussed at length risk factors of birth control including blood clots and hypertension.  Advised to go to EMERGENCY DEPARTMENT if ever develops leg swelling or shortness of breath.  Discussed side effects including headaches, nausea, mood changes.  Discussed that birth control does NOT protect again STDs, recommend routine screening with any new partner or symptoms. Discussed that concurrent smoking will further raise risk of blood clots while on contraception.  - Beta HCG Qual, Urine - [facility] and [place] ([id number])  - desogestrel-ethinyl estradiol (APRI) 0.15-30 MG-MCG per tablet; Take 1 tablet by mouth daily  Dispense: 84 tablet; Refill: 3    Patient Instructions     Go to EMERGENCY DEPARTMENT if ever develops leg swelling or shortness of breath.    Side effects including headaches, nausea, mood changes.    Discussed that birth control does NOT protect again STDs, recommend routine screening with any new partner or symptoms. Discussed that concurrent smoking will further raise risk of blood clots while on contraception.    Follow-up with Loar for regular physical before the end of the year.      Dian Norton MD  [facility]

## 2018-10-01 ENCOUNTER — OFFICE VISIT (OUTPATIENT)
Dept: PEDIATRICS | Facility: CLINIC | Age: 23
End: 2018-10-01
Payer: COMMERCIAL

## 2018-10-01 VITALS
BODY MASS INDEX: 18.95 KG/M2 | WEIGHT: 101.1 LBS | TEMPERATURE: 98.5 F | SYSTOLIC BLOOD PRESSURE: 94 MMHG | HEART RATE: 82 BPM | DIASTOLIC BLOOD PRESSURE: 58 MMHG | OXYGEN SATURATION: 97 %

## 2018-10-01 DIAGNOSIS — Z30.011 ENCOUNTER FOR INITIAL PRESCRIPTION OF CONTRACEPTIVE PILLS: Primary | ICD-10-CM

## 2018-10-01 LAB — BETA HCG QUAL IFA URINE: NEGATIVE

## 2018-10-01 PROCEDURE — 84703 CHORIONIC GONADOTROPIN ASSAY: CPT | Performed by: INTERNAL MEDICINE

## 2018-10-01 PROCEDURE — 99213 OFFICE O/P EST LOW 20 MIN: CPT | Performed by: INTERNAL MEDICINE

## 2018-10-01 RX ORDER — DESOGESTREL AND ETHINYL ESTRADIOL 0.15-0.03
1 KIT ORAL DAILY
Qty: 84 TABLET | Refills: 3 | Status: SHIPPED | OUTPATIENT
Start: 2018-10-01 | End: 2019-11-10

## 2018-10-01 NOTE — MR AVS SNAPSHOT
After Visit Summary   10/1/2018    Qian Loo    MRN: [id number]           Patient Information     Date Of Birth          1995        Visit Information        Provider Department      10/1/2018 6:30 PM Cher Norton Mai, MD [facility]        Today's Diagnoses     Encounter for initial prescription of contraceptive pills    -  1      Care Instructions      Go to EMERGENCY DEPARTMENT if ever develops leg swelling or shortness of breath.    Side effects including headaches, nausea, mood changes.    Discussed that birth control does NOT protect again STDs, recommend routine screening with any new partner or symptoms. Discussed that concurrent smoking will further raise risk of blood clots while on contraception.    Follow-up with Lora for regular physical before the end of the year.          Follow-ups after your visit        Follow-up notes from your care team     Return in about 2 months (around 12/1/2018) for Physical Exam.      Who to contact     If you have questions or need follow up information about today's clinic visit or your schedule please contact [facility] directly at [phone].  Normal or non-critical lab and imaging results will be communicated to you by [employer], letter or phone within 4 business days after the clinic has received the results. If you do not hear from us within 7 days, please contact the clinic through [employer] or phone. If you have a critical or abnormal lab result, we will notify you by phone as soon as possible.  Submit refill requests through [employer] or call your pharmacy and they will forward the refill request to us. Please allow 3 business days for your refill to be completed.          Additional Information About Your Visit        MyChart Information     [employer] gives you secure access to your electronic health record. If you see a primary care provider, you can also send messages to your care team and make appointments. If  you have questions, please call your primary care clinic.  If you do not have a primary care provider, please call [phone] and they will assist you.        Care EveryWhere ID     This is your Care EveryWhere ID. This could be used by other organizations to access your [place] medical records  [id number]        Your Vitals Were     Pulse Temperature Last Period Pulse Oximetry BMI (Body Mass Index)       82 98.5  F (36.9  C) (Oral) 09/18/2018 (Approximate) 97% 18.95 kg/m2        Blood Pressure from Last 3 Encounters:   10/01/18 94/58   06/26/18 92/62   06/15/18 106/79    Weight from Last 3 Encounters:   10/01/18 101 lb 1.6 oz (45.9 kg)   06/26/18 99 lb (44.9 kg)   06/13/18 99 lb 11.2 oz (45.2 kg)              We Performed the Following     Beta HCG Qual, Urine - [facility] and [place] ([id number])          Today's Medication Changes          These changes are accurate as of 10/1/18  7:03 PM.  If you have any questions, ask your nurse or doctor.               Start taking these medicines.        Dose/Directions    desogestrel-ethinyl estradiol 0.15-30 MG-MCG per tablet   Commonly known as:  APRI   Used for:  Encounter for initial prescription of contraceptive pills   Started by:  Cher Norton Mai, MD        Dose:  1 tablet   Take 1 tablet by mouth daily   Quantity:  84 tablet   Refills:  3            Where to get your medicines      These medications were sent to [place] [address] [place] [facility] [address]  [address], [facility] [address]     Phone:  [phone]     desogestrel-ethinyl estradiol 0.15-30 MG-MCG per tablet                Primary Care Provider Office Phone # Fax #    VALENTINO Tinsley [phone] [address] [place] [address]        Equal Access to Services     [facility] LISANDRO AH: Allegra quintero Sotamar, waaxda luqadaha, qaybta kaalbrayan joy, ita lopez. [facility] [phone].    ATENCIÓN: Si  arturo contreras, tiene a yanez disposición servicios gratuitos de asistencia lingüística. Briseida arzola [phone].    We comply with applicable federal civil rights laws and [place] laws. We do not discriminate on the basis of race, color, national origin, age, disability, sex, sexual orientation, or gender identity.            Thank you!     Thank you for choosing [facility] MAUL[facility]  for your care. Our goal is always to provide you with excellent care. Hearing back from our patients is one way we can continue to improve our services. Please take a few minutes to complete the written survey that you may receive in the mail after your visit with us. Thank you!             Your Updated Medication List - Protect others around you: Learn how to safely use, store and throw away your medicines at www.disposemymeds.org.          This list is accurate as of 10/1/18  7:03 PM.  Always use your most recent med list.                   Brand Name Dispense Instructions for use Diagnosis    desogestrel-ethinyl estradiol 0.15-30 MG-MCG per tablet    APRI    84 tablet    Take 1 tablet by mouth daily    Encounter for initial prescription of contraceptive pills       ZYRTEC ALLERGY PO      Take 5 mg by mouth daily

## 2018-10-01 NOTE — PATIENT INSTRUCTIONS
Go to EMERGENCY DEPARTMENT if ever develops leg swelling or shortness of breath.    Side effects including headaches, nausea, mood changes.    Discussed that birth control does NOT protect again STDs, recommend routine screening with any new partner or symptoms. Discussed that concurrent smoking will further raise risk of blood clots while on contraception.    Follow-up with Lora for regular physical before the end of the year.

## 2019-10-26 ENCOUNTER — APPOINTMENT (OUTPATIENT)
Dept: ULTRASOUND IMAGING | Facility: CLINIC | Age: 24
End: 2019-10-26
Attending: NURSE PRACTITIONER
Payer: COMMERCIAL

## 2019-10-26 ENCOUNTER — OFFICE VISIT (OUTPATIENT)
Dept: URGENT CARE | Facility: URGENT CARE | Age: 24
End: 2019-10-26
Payer: COMMERCIAL

## 2019-10-26 ENCOUNTER — HOSPITAL ENCOUNTER (EMERGENCY)
Facility: CLINIC | Age: 24
Discharge: HOME | End: 2019-10-26
Attending: NURSE PRACTITIONER | Admitting: NURSE PRACTITIONER
Payer: COMMERCIAL

## 2019-10-26 VITALS
TEMPERATURE: 97.8 F | HEART RATE: 72 BPM | SYSTOLIC BLOOD PRESSURE: 102 MMHG | OXYGEN SATURATION: 100 % | DIASTOLIC BLOOD PRESSURE: 60 MMHG

## 2019-10-26 VITALS
TEMPERATURE: 97.4 F | SYSTOLIC BLOOD PRESSURE: 113 MMHG | DIASTOLIC BLOOD PRESSURE: 92 MMHG | OXYGEN SATURATION: 100 % | HEART RATE: 63 BPM | RESPIRATION RATE: 24 BRPM

## 2019-10-26 DIAGNOSIS — R11.2 NAUSEA AND VOMITING: ICD-10-CM

## 2019-10-26 DIAGNOSIS — R11.10 INTRACTABLE VOMITING, PRESENCE OF NAUSEA NOT SPECIFIED, UNSPECIFIED VOMITING TYPE: ICD-10-CM

## 2019-10-26 DIAGNOSIS — R10.84 ABDOMINAL PAIN, GENERALIZED: Primary | ICD-10-CM

## 2019-10-26 DIAGNOSIS — O30.009 TWIN PREGNANCY: ICD-10-CM

## 2019-10-26 LAB
ABO + RH BLD: NORMAL
ABO + RH BLD: NORMAL
ALBUMIN SERPL-MCNC: 4.3 G/DL (ref 3.4–5)
ALP SERPL-CCNC: 60 U/L (ref 40–150)
ALT SERPL W P-5'-P-CCNC: 26 U/L (ref 0–50)
ANION GAP SERPL CALCULATED.3IONS-SCNC: 15 MMOL/L (ref 3–14)
AST SERPL W P-5'-P-CCNC: 21 U/L (ref 0–45)
B-HCG FREE SERPL-ACNC: >2000 IU/L
B-HCG SERPL-ACNC: ABNORMAL IU/L (ref 0–5)
BASOPHILS # BLD AUTO: 0 10E9/L (ref 0–0.2)
BASOPHILS NFR BLD AUTO: 0.1 %
BILIRUB DIRECT SERPL-MCNC: 0.1 MG/DL (ref 0–0.2)
BILIRUB SERPL-MCNC: 0.5 MG/DL (ref 0.2–1.3)
BUN SERPL-MCNC: 9 MG/DL (ref 7–30)
CALCIUM SERPL-MCNC: 9.1 MG/DL (ref 8.5–10.1)
CHLORIDE SERPL-SCNC: 101 MMOL/L (ref 94–109)
CO2 SERPL-SCNC: 19 MMOL/L (ref 20–32)
CREAT SERPL-MCNC: 0.58 MG/DL (ref 0.52–1.04)
DIFFERENTIAL METHOD BLD: ABNORMAL
EOSINOPHIL # BLD AUTO: 0 10E9/L (ref 0–0.7)
EOSINOPHIL NFR BLD AUTO: 0.1 %
ERYTHROCYTE [DISTWIDTH] IN BLOOD BY AUTOMATED COUNT: 11.9 % (ref 10–15)
GFR SERPL CREATININE-BSD FRML MDRD: >90 ML/MIN/{1.73_M2}
GLUCOSE SERPL-MCNC: 126 MG/DL (ref 70–99)
HCT VFR BLD AUTO: 41.5 % (ref 35–47)
HGB BLD-MCNC: 14.8 G/DL (ref 11.7–15.7)
IMM GRANULOCYTES # BLD: 0.1 10E9/L (ref 0–0.4)
IMM GRANULOCYTES NFR BLD: 0.5 %
LYMPHOCYTES # BLD AUTO: 2.6 10E9/L (ref 0.8–5.3)
LYMPHOCYTES NFR BLD AUTO: 17.4 %
MCH RBC QN AUTO: 31.9 PG (ref 26.5–33)
MCHC RBC AUTO-ENTMCNC: 35.7 G/DL (ref 31.5–36.5)
MCV RBC AUTO: 89 FL (ref 78–100)
MONOCYTES # BLD AUTO: 0.9 10E9/L (ref 0–1.3)
MONOCYTES NFR BLD AUTO: 6 %
NEUTROPHILS # BLD AUTO: 11.3 10E9/L (ref 1.6–8.3)
NEUTROPHILS NFR BLD AUTO: 75.9 %
NRBC # BLD AUTO: 0 10*3/UL
NRBC BLD AUTO-RTO: 0 /100
PLATELET # BLD AUTO: 373 10E9/L (ref 150–450)
POTASSIUM SERPL-SCNC: 3 MMOL/L (ref 3.4–5.3)
PROT SERPL-MCNC: 8.2 G/DL (ref 6.8–8.8)
RBC # BLD AUTO: 4.64 10E12/L (ref 3.8–5.2)
SODIUM SERPL-SCNC: 135 MMOL/L (ref 133–144)
SPECIMEN EXP DATE BLD: NORMAL
WBC # BLD AUTO: 14.9 10E9/L (ref 4–11)

## 2019-10-26 PROCEDURE — 25000128 H RX IP 250 OP 636: Performed by: NURSE PRACTITIONER

## 2019-10-26 PROCEDURE — 99285 EMERGENCY DEPT VISIT HI MDM: CPT | Mod: 25

## 2019-10-26 PROCEDURE — 85025 COMPLETE CBC W/AUTO DIFF WBC: CPT | Performed by: EMERGENCY MEDICINE

## 2019-10-26 PROCEDURE — 25000128 H RX IP 250 OP 636: Performed by: EMERGENCY MEDICINE

## 2019-10-26 PROCEDURE — 96374 THER/PROPH/DIAG INJ IV PUSH: CPT

## 2019-10-26 PROCEDURE — 96375 TX/PRO/DX INJ NEW DRUG ADDON: CPT

## 2019-10-26 PROCEDURE — 80076 HEPATIC FUNCTION PANEL: CPT | Performed by: EMERGENCY MEDICINE

## 2019-10-26 PROCEDURE — 96361 HYDRATE IV INFUSION ADD-ON: CPT

## 2019-10-26 PROCEDURE — 76801 OB US < 14 WKS SINGLE FETUS: CPT

## 2019-10-26 PROCEDURE — 84702 CHORIONIC GONADOTROPIN TEST: CPT

## 2019-10-26 PROCEDURE — 76705 ECHO EXAM OF ABDOMEN: CPT

## 2019-10-26 PROCEDURE — 84702 CHORIONIC GONADOTROPIN TEST: CPT | Performed by: EMERGENCY MEDICINE

## 2019-10-26 PROCEDURE — 86901 BLOOD TYPING SEROLOGIC RH(D): CPT | Performed by: NURSE PRACTITIONER

## 2019-10-26 PROCEDURE — 36415 COLL VENOUS BLD VENIPUNCTURE: CPT | Performed by: NURSE PRACTITIONER

## 2019-10-26 PROCEDURE — 80048 BASIC METABOLIC PNL TOTAL CA: CPT | Performed by: EMERGENCY MEDICINE

## 2019-10-26 RX ORDER — ONDANSETRON 2 MG/ML
4 INJECTION INTRAMUSCULAR; INTRAVENOUS ONCE
Status: COMPLETED | OUTPATIENT
Start: 2019-10-26 | End: 2019-10-26

## 2019-10-26 RX ORDER — MORPHINE SULFATE 4 MG/ML
4 INJECTION, SOLUTION INTRAMUSCULAR; INTRAVENOUS ONCE
Status: COMPLETED | OUTPATIENT
Start: 2019-10-26 | End: 2019-10-26

## 2019-10-26 RX ORDER — METOCLOPRAMIDE 10 MG/1
10 TABLET ORAL 3 TIMES DAILY PRN
Qty: 30 TABLET | Refills: 0 | Status: ON HOLD | OUTPATIENT
Start: 2019-10-26 | End: 2019-11-12

## 2019-10-26 RX ORDER — METOCLOPRAMIDE HYDROCHLORIDE 5 MG/ML
10 INJECTION INTRAMUSCULAR; INTRAVENOUS ONCE
Status: COMPLETED | OUTPATIENT
Start: 2019-10-26 | End: 2019-10-26

## 2019-10-26 RX ADMIN — SODIUM CHLORIDE 1000 ML: 9 INJECTION, SOLUTION INTRAVENOUS at 13:14

## 2019-10-26 RX ADMIN — MORPHINE SULFATE 4 MG: 4 INJECTION INTRAVENOUS at 14:04

## 2019-10-26 RX ADMIN — ONDANSETRON HYDROCHLORIDE 4 MG: 2 INJECTION, SOLUTION INTRAMUSCULAR; INTRAVENOUS at 13:15

## 2019-10-26 RX ADMIN — METOCLOPRAMIDE 10 MG: 5 INJECTION, SOLUTION INTRAMUSCULAR; INTRAVENOUS at 16:23

## 2019-10-26 ASSESSMENT — ENCOUNTER SYMPTOMS
SORE THROAT: 0
NAUSEA: 1
CHILLS: 1
DIARRHEA: 0
DYSURIA: 0
ABDOMINAL PAIN: 1
DIAPHORESIS: 0
APPETITE CHANGE: 1
COUGH: 0
CONSTIPATION: 1
FATIGUE: 1
FEVER: 0
VOMITING: 1
LIGHT-HEADEDNESS: 1
SHORTNESS OF BREATH: 0
DIZZINESS: 1

## 2019-10-26 NOTE — PROGRESS NOTES
THIS IS A TRIAGE ENCOUNTER.    Qian Loo is a 24 year old female presenting with a chief complaint of severe, frequent vomiting (between 10-20 times a day for the past four days), worsening severe abdominal cramps (for the past four days), body aches.  She has not been able to produce any stool for the past four days.  In addition, she has not been able to produce much urine.      Patient will go immediately to the emergency room (her significant other/friend will drive her to the hospital) for further evaluation of the severe vomiting, severe abdominal pain.       Carlos Adams MD

## 2019-10-26 NOTE — ED PROVIDER NOTES
History     Chief Complaint:  Abdominal Pain and Nausea & Vomiting    HPI  Qian Loo is a 24 year old female who presents to the emergency department today for evaluation of abdominal pain, nausea, and vomiting. The patient reports an onset of nausea, vomiting, and constant generalized abdominal pain 4 days ago, noting she has also not had a bowel movement since that time. She has been unable to keep any food or liquids down.  She tried to eat corn and drink Pedialyte earlier today and vomited it right back up. The patient also endorses feeling as if she's going to pass out. She is feeling very chilled and shaking on evaluation though admits she has not taken her temperature. She is 1 week late on her menstrual period.    Allergies:  No known drug allergies    Medications:    Cetirizine    Past Medical History:    Allergic rhinitis  Depression     Past Surgical History:    History reviewed. No pertinent surgical history.    Family History:    The patient's family history includes Unknown/Adopted in her father, maternal grandfather, maternal grandmother, mother, and another family member. She was adopted.    Social History:  The patient reports that she has never smoked. She has never used smokeless tobacco. She reports current alcohol use. She reports current drug use.   PCP: Lora Garcia  Marital Status: Single    Review of Systems   Constitutional: Positive for appetite change, chills and fatigue. Negative for diaphoresis and fever.   HENT: Negative for congestion and sore throat.    Respiratory: Negative for cough and shortness of breath.    Cardiovascular: Negative for chest pain.   Gastrointestinal: Positive for abdominal pain, constipation, nausea and vomiting. Negative for diarrhea.   Genitourinary: Negative.  Negative for dysuria.   Neurological: Positive for dizziness and light-headedness. Negative for syncope.   All other systems reviewed and are negative.      Physical Exam     Patient  Vitals for the past 24 hrs:   BP Temp Temp src Pulse Resp SpO2   10/26/19 1507 -- -- -- -- -- 100 %   10/26/19 1506 108/79 -- -- 84 -- --   10/26/19 1415 105/74 -- -- 70 -- 100 %   10/26/19 1400 107/74 -- -- 72 -- 99 %   10/26/19 1345 107/79 -- -- 62 -- 100 %   10/26/19 1258 125/81 97.4  F (36.3  C) Temporal 73 24 100 %     Physical Exam  General: Alert, Severe discomfort, well kept  Eyes: PERRL, conjunctivae pink no scleral icterus or conjunctival injection  ENT:   Moist mucus membranes, posterior oropharynx clear without erythema or exudates, No lymphadenopathy, Normal voice  Resp:  Lungs clear to auscultation bilaterally, no crackles/rubs/wheezes. Good air movement  CV:  Normal rate and rhythm, no murmurs/rubs/gallops  GI:  Abdomen soft and non-distended.  Normoactive BS.   Mid epigasteric tenderness, No guarding or rebound, No masses  Skin:  Warm, dry.  No rashes or petechiae  Musculoskeletal: No peripheral edema or calf tenderness, Normal gross ROM   Neuro: Alert and oriented to person/place/time, normal sensation  Psychiatric: Normal affect, cooperative, good eye contact    Emergency Department Course   Imaging:  Radiology findings were communicated with the patient who voiced understanding of the findings.    US OB 1St Trimester Multiple w Transvag  Preliminary Result  IMPRESSION: Twin, live, intrauterine gestation.    Results per radiology.    US Abdomen Limited  Preliminary Result  IMPRESSION:   Normal right upper quadrant ultrasound.   Results per radiology.    Laboratory:  Laboratory findings were communicated with the patient who voiced understanding of the findings.    CBC: WBC 14.9 (H), HGB 14.8,   BMP: Potassium 3.0 (L), Carbon dioxide 19 (L), Anion gap 15 (H), Glucose 126 (H) o/w WNL (Creatinine 0.58)  ISTAT HCG quant: >2,000.0 (H)  Hepatic panel: WNL  HCG quant: 33,675 (H)  Rh type: B Pos    Interventions:  1314: NS 1L IV Bolus  1315: Zofran 4mg IV  1404: Morphine 4mg IV    1623: Reglan 10mg  IV     Emergency Department Course:  Past medical records, nursing notes, and vitals reviewed.  1336: I performed an exam of the patient and obtained history, as documented above. GCS 15.    IV inserted and blood drawn.    The patient was sent for an ultrasound while in the emergency department, findings above.    1534: I rechecked the patient. Explained findings to patient.    1611: Patient did not tolerate PO challenge.    1714: I rechecked the patient. She is feeling much improved. Passed PO challenge. Findings and plan explained to the Patient. Patient discharged home with instructions regarding supportive care, medications, and reasons to return. The importance of close follow-up was reviewed.     Impression & Plan      Medical Decision Making:  Qian Loo is a 24 year old female who presents for evaluation of nausea and vomiting.  She is currently pregnant.  Nonetheless. I considered a broad differential diagnosis for this patient including viral gastroenteritis, food poisoning, bowel obstruction, intra-abdominal infection such as colitis, cholecystitis, UTI, pyelonephritis, appendicitis, etc.  There are no signs of worrisome intra-abdominal pathologies detected during the visit today.  The patient has a completely benign abdominal exam without rebound, guarding, or marked tenderness to palpation.  No ectopic pregnancy on US.      Supportive outpatient management is therefore indicated.  Abdominal pain precautions are given for home.    It was discussed with the patient to return to the ED for blood in stool, increasing pain, or fevers more than 102.   Feels much improved after interventions in ED.  See above for medications for home.  I believe all of her symptoms are at this point explained by the pregnancy and hyperemesis gravidarum.        Diagnosis:    ICD-10-CM   1. Nausea and vomiting R11.2   2. Twin pregnancy O30.009       Disposition:   Discharged.    Discharge Medications:     Dose /  Directions   metoclopramide 10 MG tablet  Commonly known as:  REGLAN      Dose:  10 mg  Take 1 tablet (10 mg) by mouth 3 times daily as needed (Nausea or Vomiting)  Quantity:  30 tablet  Refills:  0       Scribe Disclosure:  I, Tova Leatha, am serving as a scribe at 1:36 PM on 10/26/2019 to document services personally performed by Shaun Patrick APRN based on my observations and the provider's statements to me.    [facility] EMERGENCY DEPARTMENT       Shaun Patrick APRN CNP  10/26/19 0318

## 2019-10-26 NOTE — ED TRIAGE NOTES
4 days of abdominal pain, vomiting and constipation.      Patient is one week late for period.     ABCs intact.  Alert and oriented x 3.

## 2019-10-26 NOTE — ED NOTES
MD in to see patient and discuss diagnosis, test results, and discharge plan. Patient meets discharge criteria. Discussed AVS with patient and significant other. Questions answered. Answered questions regarding twin pregnancy and morning sickness. Patient verbalized understanding. Patient and family reports being ready to go home. Patient discharged home with family by car with all necessary information.     Went in to assess pt after hand off report received from the prior RN. Pt resting quietly in bed with wife at bedside. VS obtained and remain stable to pre-procedure level. No c/o. When asked about the pain/discomfort pt reports at level 2 and tolerable. Ice pack in use. Umbilical incision clean dry and intact with dermabond. IV patent. Call light within reach. Pt has hydrocodone at home so will use that first before filling their current script. Pt agrees with plan to work on being discharged. IV d/c'd without incident. Belongings returned and pt working on getting dressed with help of wife.

## 2019-10-26 NOTE — DISCHARGE INSTRUCTIONS
You may use Unisom vit B 6, as well as real son and Reglan to help with nausea and vomiting.  Again small frequent meals will help.

## 2019-10-26 NOTE — ED AVS SNAPSHOT
[facility] Emergency Department  [address]  [place] [address]  Phone:  [phone]  Fax:  [phone]                                    Qian Loo   MRN: [id number]    Department:  [facility] Emergency Department   Date of Visit:  10/26/2019           After Visit Summary Signature Page    I have received my discharge instructions, and my questions have been answered. I have discussed any challenges I see with this plan with the nurse or doctor.    ..........................................................................................................................................  Patient/Patient Representative Signature      ..........................................................................................................................................  Patient Representative Print Name and Relationship to Patient    ..................................................               ................................................  Date                                   Time    ..........................................................................................................................................  Reviewed by Signature/Title    ...................................................              ..............................................  Date                                               Time          22EPIC Rev 08/18

## 2019-10-27 ENCOUNTER — HOSPITAL ENCOUNTER (EMERGENCY)
Facility: CLINIC | Age: 24
Discharge: HOME | End: 2019-10-27
Attending: EMERGENCY MEDICINE | Admitting: EMERGENCY MEDICINE
Payer: COMMERCIAL

## 2019-10-27 VITALS
RESPIRATION RATE: 16 BRPM | OXYGEN SATURATION: 97 % | DIASTOLIC BLOOD PRESSURE: 38 MMHG | SYSTOLIC BLOOD PRESSURE: 102 MMHG | TEMPERATURE: 97.7 F | HEART RATE: 86 BPM

## 2019-10-27 DIAGNOSIS — O21.0 HYPEREMESIS GRAVIDARUM: ICD-10-CM

## 2019-10-27 LAB
ANION GAP SERPL CALCULATED.3IONS-SCNC: 10 MMOL/L (ref 3–14)
BASE DEFICIT BLDV-SCNC: 3.2 MMOL/L
BUN SERPL-MCNC: 5 MG/DL (ref 7–30)
CALCIUM SERPL-MCNC: 8.6 MG/DL (ref 8.5–10.1)
CHLORIDE SERPL-SCNC: 102 MMOL/L (ref 94–109)
CO2 SERPL-SCNC: 20 MMOL/L (ref 20–32)
CREAT SERPL-MCNC: 0.54 MG/DL (ref 0.52–1.04)
ERYTHROCYTE [DISTWIDTH] IN BLOOD BY AUTOMATED COUNT: 11.8 % (ref 10–15)
GFR SERPL CREATININE-BSD FRML MDRD: >90 ML/MIN/{1.73_M2}
GLUCOSE SERPL-MCNC: 105 MG/DL (ref 70–99)
HCO3 BLDV-SCNC: 21 MMOL/L (ref 21–28)
HCT VFR BLD AUTO: 40.9 % (ref 35–47)
HGB BLD-MCNC: 14.3 G/DL (ref 11.7–15.7)
KETONES BLD-SCNC: 0.9 MMOL/L (ref 0–0.6)
MCH RBC QN AUTO: 31.4 PG (ref 26.5–33)
MCHC RBC AUTO-ENTMCNC: 35 G/DL (ref 31.5–36.5)
MCV RBC AUTO: 90 FL (ref 78–100)
O2/TOTAL GAS SETTING VFR VENT: ABNORMAL %
OXYHGB MFR BLDV: 77 %
PCO2 BLDV: 33 MM HG (ref 40–50)
PH BLDV: 7.41 PH (ref 7.32–7.43)
PLATELET # BLD AUTO: 304 10E9/L (ref 150–450)
PO2 BLDV: 37 MM HG (ref 25–47)
POTASSIUM SERPL-SCNC: 3.5 MMOL/L (ref 3.4–5.3)
RBC # BLD AUTO: 4.55 10E12/L (ref 3.8–5.2)
SODIUM SERPL-SCNC: 132 MMOL/L (ref 133–144)
WBC # BLD AUTO: 13.3 10E9/L (ref 4–11)

## 2019-10-27 PROCEDURE — 82010 KETONE BODYS QUAN: CPT | Performed by: EMERGENCY MEDICINE

## 2019-10-27 PROCEDURE — 25000128 H RX IP 250 OP 636: Performed by: EMERGENCY MEDICINE

## 2019-10-27 PROCEDURE — 82805 BLOOD GASES W/O2 SATURATION: CPT | Performed by: EMERGENCY MEDICINE

## 2019-10-27 PROCEDURE — 25800030 ZZH RX IP 258 OP 636: Performed by: EMERGENCY MEDICINE

## 2019-10-27 PROCEDURE — 80048 BASIC METABOLIC PNL TOTAL CA: CPT | Performed by: EMERGENCY MEDICINE

## 2019-10-27 PROCEDURE — 96375 TX/PRO/DX INJ NEW DRUG ADDON: CPT

## 2019-10-27 PROCEDURE — 85027 COMPLETE CBC AUTOMATED: CPT | Performed by: EMERGENCY MEDICINE

## 2019-10-27 PROCEDURE — 96361 HYDRATE IV INFUSION ADD-ON: CPT

## 2019-10-27 PROCEDURE — 99284 EMERGENCY DEPT VISIT MOD MDM: CPT | Mod: 25

## 2019-10-27 PROCEDURE — 96374 THER/PROPH/DIAG INJ IV PUSH: CPT

## 2019-10-27 RX ORDER — DIPHENHYDRAMINE HYDROCHLORIDE 50 MG/ML
25 INJECTION INTRAMUSCULAR; INTRAVENOUS ONCE
Status: COMPLETED | OUTPATIENT
Start: 2019-10-27 | End: 2019-10-27

## 2019-10-27 RX ORDER — METOCLOPRAMIDE HYDROCHLORIDE 5 MG/ML
10 INJECTION INTRAMUSCULAR; INTRAVENOUS ONCE
Status: COMPLETED | OUTPATIENT
Start: 2019-10-27 | End: 2019-10-27

## 2019-10-27 RX ORDER — ONDANSETRON 2 MG/ML
8 INJECTION INTRAMUSCULAR; INTRAVENOUS ONCE
Status: COMPLETED | OUTPATIENT
Start: 2019-10-27 | End: 2019-10-27

## 2019-10-27 RX ORDER — ONDANSETRON 4 MG/1
4 TABLET, ORALLY DISINTEGRATING ORAL EVERY 6 HOURS PRN
Qty: 20 TABLET | Refills: 0 | Status: ON HOLD | OUTPATIENT
Start: 2019-10-27 | End: 2019-11-12

## 2019-10-27 RX ORDER — PYRIDOXINE HCL (VITAMIN B6) 25 MG
25 TABLET ORAL 2 TIMES DAILY
Qty: 60 TABLET | Refills: 0 | Status: SHIPPED | OUTPATIENT
Start: 2019-10-27 | End: 2019-12-14

## 2019-10-27 RX ADMIN — SODIUM CHLORIDE, POTASSIUM CHLORIDE, SODIUM LACTATE AND CALCIUM CHLORIDE 1000 ML: 600; 310; 30; 20 INJECTION, SOLUTION INTRAVENOUS at 09:09

## 2019-10-27 RX ADMIN — ONDANSETRON HYDROCHLORIDE 8 MG: 2 INJECTION, SOLUTION INTRAMUSCULAR; INTRAVENOUS at 10:36

## 2019-10-27 RX ADMIN — DIPHENHYDRAMINE HYDROCHLORIDE 25 MG: 50 INJECTION, SOLUTION INTRAMUSCULAR; INTRAVENOUS at 09:27

## 2019-10-27 RX ADMIN — METOCLOPRAMIDE 10 MG: 5 INJECTION, SOLUTION INTRAMUSCULAR; INTRAVENOUS at 09:27

## 2019-10-27 RX ADMIN — PROMETHAZINE HYDROCHLORIDE 25 MG: 25 INJECTION INTRAMUSCULAR; INTRAVENOUS at 11:44

## 2019-10-27 ASSESSMENT — ENCOUNTER SYMPTOMS
FEVER: 0
NAUSEA: 1
SHORTNESS OF BREATH: 0
VOMITING: 1
DYSURIA: 0
DIARRHEA: 0

## 2019-10-27 NOTE — ED TRIAGE NOTES
"Patient arrived at around 8:45 complaining of continued vomiting and abdominal cramping. Patient discharge from emergency room able to tolerate PO yesterday evening. Patient reports she was doing \"ok\" last night and slept last night but has not been able to keep fluids or food this morning. Patient reports throwing up about 10 times since leaving the emergency room. ABCs intact. Alert and oriented X4. Oriented to room and call light. Patient and significant other educated about hand hygiene practices.    "

## 2019-10-27 NOTE — ED PROVIDER NOTES
History     Chief Complaint:  Vomiting       HPI   Qian Loo is a 24 year old female who presents with vomiting.  Patient was seen in the ED yesterday in which she was found to be pregnant with twin gestation measuring 5 weeks and 6 days and 6 weeks 0 days based on transvaginal ultrasound.  There is no evidence of heterotopic pregnancy.  Right upper quadrant ultrasound was negative.  Laboratory studies revealed hypokalemia and depressed bicarb level.  She improved during ED stay.  Since discharge the patient has had greater than 10 episodes of vomiting.  There has been no associated hematemesis.  She has been unable to keep down any fluids or food.  She has no associated diarrhea.  She does have mild generalized abdominal discomfort which is a crampy sensation that waxes and wanes.  There has been no alleviating or aggravating factors.  She denies any dysuria, urinary frequency, vaginal bleeding or discharge.  She does not have a pre-existing relationship with a prior OB/GYN physician..    Allergies:  NKDA    Medications:    doxylamine (UNISOM) 25 MG TABS tablet  metoclopramide (REGLAN) 10 MG tablet  ondansetron (ZOFRAN ODT) 4 MG ODT tab  vitamin B6 (PYRIDOXINE) 25 MG tablet  Cetirizine HCl (ZYRTEC ALLERGY PO)  desogestrel-ethinyl estradiol (APRI) 0.15-30 MG-MCG per tablet      Past Medical History:    Depression  Allergic rhinitis    Past Surgical History:    None    Family History:    family history includes Unknown/Adopted in her father, maternal grandfather, maternal grandmother, mother, and another family member. She was adopted.    Social History:   reports that she has never smoked. She has never used smokeless tobacco. She reports previous alcohol use. She reports current drug use.    PCP: Lora Garcia     Review of Systems   Constitutional: Negative for fever.   Respiratory: Negative for shortness of breath.    Gastrointestinal: Positive for nausea and vomiting. Negative for diarrhea.    Genitourinary: Negative for dysuria, vaginal bleeding and vaginal discharge.   Skin: Negative for rash.   All other systems reviewed and are negative.        Physical Exam     Patient Vitals for the past 24 hrs:   BP Temp Temp src Pulse Resp SpO2   10/27/19 1022 -- 97.7  F (36.5  C) Oral -- -- --   10/27/19 1015 111/82 -- -- 70 -- 100 %   10/27/19 1000 122/84 -- -- 58 -- 100 %   10/27/19 0945 112/79 -- -- 69 -- 100 %   10/27/19 0930 108/79 -- -- 77 -- 100 %   10/27/19 0915 117/85 -- -- 70 -- 100 %   10/27/19 0900 102/70 -- -- 83 -- 100 %   10/27/19 0845 112/74 -- -- 60 20 --        Physical Exam    Constitutional:  Pleasant, age appropriate female who appears visibly nauseated  HEENT:    Oropharynx is moist.  Eyes:    Conjunctiva normal  Neck:    Supple, no meningismus.     CV:     Regular rate and rhythm.      No murmurs, rubs or gallops.     No lower extremity edema.  PULM:    Clear to auscultation bilateral.       No respiratory distress.      Good air exchange.  ABD:    Soft, non-distended.       Mild diffuse abdominal tenderness.     Uterine fundus not palpable     No pulsatile masses.       No rebound, guarding or rigidity.     No CVA tenderness.   MSK:     No gross deformity to all four extremities.   LYMPH:   No cervical lymphadenopathy.  NEURO:   Alert.  Good muscular tone, no atrophy.   Skin:    Warm, dry and intact.    Psych:    Mood is good and affect is appropriate.    Emergency Department Course     Laboratory:  CBC: WBC: 13.3 (H), HGB: 14.3, PLT: 304  BMP: Glucose 105 (H), Na 132 (L), Urea Nitrogen 5 (L), o/w WNL (Creatinine: 0.54)    Ketone Beta-Hydroxybutyrate: 0.9 (H)    Venous Blood Gas  Recent Labs   Lab 10/27/19  0859   PHV 7.41   PCO2V 33*   PO2V 37   HCO3V 21   O2PER Room Air     Interventions:  0909 Lactated ringers bolus 1000 mL IV  0927 Reglan 10 mg IV   Benadryl 25 mg IV  1036 Zofran 8 mg iV  1100 Dextrose 5% IV  1144 Phenergan 25 mg IV     Emergency Department Course:  Past medical  records, nursing notes, and vitals reviewed.  I performed an exam of the patient and obtained history, as documented above.    Findings and plan explained to the Patient. Patient discharged home with instructions regarding supportive care, medications, and reasons to return. The importance of close follow-up was reviewed. The patient was prescribed Unisom, Zofran, Pyridoxine.    Impression & Plan      Medical Decision Makin-year-old female currently 6 weeks pregnant with twin gestation presented to the ED with intractable nausea and vomiting not improved with Reglan.  Laboratory studies revealed no evidence of electrolyte disturbance or intravascular volume depletion.  She required multiple rounds of antiemetics for her symptoms to resolve.  She has not had any recurrent vomiting in the ED.  She feels comfortable with discharge home.  In addition to Reglan, will add on Zofran, pyridoxine and doxylamine.  She will be referred to obstetrics for further management of hyperemesis gravidarum.    Diagnosis:    ICD-10-CM    1. Hyperemesis gravidarum O21.0         Discharge Medications:  New Prescriptions    DOXYLAMINE (UNISOM) 25 MG TABS TABLET    Take 0.5 tablets (12.5 mg) by mouth 2 times daily    ONDANSETRON (ZOFRAN ODT) 4 MG ODT TAB    Take 1 tablet (4 mg) by mouth every 6 hours as needed for nausea    VITAMIN B6 (PYRIDOXINE) 25 MG TABLET    Take 1 tablet (25 mg) by mouth 2 times daily        10/27/2019   Brady Perry MD Matthews, Jeremiah R, MD  10/27/19 4635

## 2019-10-27 NOTE — DISCHARGE INSTRUCTIONS
Please make an appointment [facility] [phone] as soon as possible even if entirely better.

## 2019-10-28 ENCOUNTER — NURSE TRIAGE (OUTPATIENT)
Dept: NURSING | Facility: CLINIC | Age: 24
End: 2019-10-28

## 2019-10-28 NOTE — TELEPHONE ENCOUNTER
Qian seen in ED x 2 over weekend for hyperemesis gravidarum.  States she needs referral to OB.  Did review discharge paperwork, no official referral, advised she should not need a referral to establish care with OB for pregnancy related care.  Was referred per ED provider informally to f/u with OB for further management of symptoms without further specific recommendations.           Reason for Disposition    [1] Follow-up call to recent contact AND [2] information only call, no triage required    Protocols used: INFORMATION ONLY CALL-A-

## 2019-11-01 ENCOUNTER — NURSE TRIAGE (OUTPATIENT)
Dept: NURSING | Facility: CLINIC | Age: 24
End: 2019-11-01

## 2019-11-01 ENCOUNTER — APPOINTMENT (OUTPATIENT)
Dept: ULTRASOUND IMAGING | Facility: CLINIC | Age: 24
End: 2019-11-01
Attending: EMERGENCY MEDICINE
Payer: COMMERCIAL

## 2019-11-01 ENCOUNTER — HOSPITAL ENCOUNTER (EMERGENCY)
Facility: CLINIC | Age: 24
Discharge: HOME | End: 2019-11-01
Attending: EMERGENCY MEDICINE | Admitting: EMERGENCY MEDICINE
Payer: COMMERCIAL

## 2019-11-01 VITALS
DIASTOLIC BLOOD PRESSURE: 79 MMHG | HEIGHT: 61 IN | HEART RATE: 74 BPM | BODY MASS INDEX: 18.61 KG/M2 | TEMPERATURE: 98.1 F | OXYGEN SATURATION: 99 % | RESPIRATION RATE: 16 BRPM | WEIGHT: 98.6 LBS | SYSTOLIC BLOOD PRESSURE: 115 MMHG

## 2019-11-01 DIAGNOSIS — E86.0 DEHYDRATION: ICD-10-CM

## 2019-11-01 DIAGNOSIS — N93.9 VAGINAL BLEEDING: ICD-10-CM

## 2019-11-01 LAB
ALBUMIN UR-MCNC: 100 MG/DL
ANION GAP SERPL CALCULATED.3IONS-SCNC: 12 MMOL/L (ref 3–14)
APPEARANCE UR: ABNORMAL
B-HCG SERPL-ACNC: ABNORMAL IU/L (ref 0–5)
BACTERIA #/AREA URNS HPF: ABNORMAL /HPF
BASOPHILS # BLD AUTO: 0.1 10E9/L (ref 0–0.2)
BASOPHILS NFR BLD AUTO: 0.5 %
BILIRUB UR QL STRIP: NEGATIVE
BUN SERPL-MCNC: 7 MG/DL (ref 7–30)
CALCIUM SERPL-MCNC: 9.1 MG/DL (ref 8.5–10.1)
CHLORIDE SERPL-SCNC: 101 MMOL/L (ref 94–109)
CO2 SERPL-SCNC: 19 MMOL/L (ref 20–32)
COLOR UR AUTO: ABNORMAL
CREAT SERPL-MCNC: 0.5 MG/DL (ref 0.52–1.04)
DIFFERENTIAL METHOD BLD: ABNORMAL
EOSINOPHIL # BLD AUTO: 0.1 10E9/L (ref 0–0.7)
EOSINOPHIL NFR BLD AUTO: 0.5 %
ERYTHROCYTE [DISTWIDTH] IN BLOOD BY AUTOMATED COUNT: 11.8 % (ref 10–15)
GFR SERPL CREATININE-BSD FRML MDRD: >90 ML/MIN/{1.73_M2}
GLUCOSE SERPL-MCNC: 83 MG/DL (ref 70–99)
GLUCOSE UR STRIP-MCNC: NEGATIVE MG/DL
HCT VFR BLD AUTO: 47.6 % (ref 35–47)
HGB BLD-MCNC: 16.6 G/DL (ref 11.7–15.7)
HGB UR QL STRIP: ABNORMAL
IMM GRANULOCYTES # BLD: 0 10E9/L (ref 0–0.4)
IMM GRANULOCYTES NFR BLD: 0.4 %
KETONES UR STRIP-MCNC: >150 MG/DL
LEUKOCYTE ESTERASE UR QL STRIP: ABNORMAL
LYMPHOCYTES # BLD AUTO: 3.4 10E9/L (ref 0.8–5.3)
LYMPHOCYTES NFR BLD AUTO: 31.5 %
MCH RBC QN AUTO: 31.9 PG (ref 26.5–33)
MCHC RBC AUTO-ENTMCNC: 34.9 G/DL (ref 31.5–36.5)
MCV RBC AUTO: 92 FL (ref 78–100)
MONOCYTES # BLD AUTO: 0.8 10E9/L (ref 0–1.3)
MONOCYTES NFR BLD AUTO: 6.9 %
MUCOUS THREADS #/AREA URNS LPF: PRESENT /LPF
NEUTROPHILS # BLD AUTO: 6.6 10E9/L (ref 1.6–8.3)
NEUTROPHILS NFR BLD AUTO: 60.2 %
NITRATE UR QL: NEGATIVE
NRBC # BLD AUTO: 0 10*3/UL
NRBC BLD AUTO-RTO: 0 /100
PH UR STRIP: 6 PH (ref 5–7)
PLATELET # BLD AUTO: 319 10E9/L (ref 150–450)
POTASSIUM SERPL-SCNC: 5 MMOL/L (ref 3.4–5.3)
RBC # BLD AUTO: 5.2 10E12/L (ref 3.8–5.2)
RBC #/AREA URNS AUTO: 5 /HPF (ref 0–2)
SODIUM SERPL-SCNC: 132 MMOL/L (ref 133–144)
SOURCE: ABNORMAL
SP GR UR STRIP: 1.03 (ref 1–1.03)
SQUAMOUS #/AREA URNS AUTO: 37 /HPF (ref 0–1)
UROBILINOGEN UR STRIP-MCNC: NORMAL MG/DL (ref 0–2)
WBC # BLD AUTO: 10.9 10E9/L (ref 4–11)
WBC #/AREA URNS AUTO: 16 /HPF (ref 0–5)

## 2019-11-01 PROCEDURE — 85025 COMPLETE CBC W/AUTO DIFF WBC: CPT | Performed by: EMERGENCY MEDICINE

## 2019-11-01 PROCEDURE — 76801 OB US < 14 WKS SINGLE FETUS: CPT

## 2019-11-01 PROCEDURE — 99284 EMERGENCY DEPT VISIT MOD MDM: CPT | Mod: 25

## 2019-11-01 PROCEDURE — 96361 HYDRATE IV INFUSION ADD-ON: CPT

## 2019-11-01 PROCEDURE — 80048 BASIC METABOLIC PNL TOTAL CA: CPT | Performed by: EMERGENCY MEDICINE

## 2019-11-01 PROCEDURE — 84702 CHORIONIC GONADOTROPIN TEST: CPT | Performed by: EMERGENCY MEDICINE

## 2019-11-01 PROCEDURE — 81001 URINALYSIS AUTO W/SCOPE: CPT | Performed by: EMERGENCY MEDICINE

## 2019-11-01 PROCEDURE — 96360 HYDRATION IV INFUSION INIT: CPT

## 2019-11-01 PROCEDURE — 87491 CHLMYD TRACH DNA AMP PROBE: CPT | Performed by: EMERGENCY MEDICINE

## 2019-11-01 PROCEDURE — 87591 N.GONORRHOEAE DNA AMP PROB: CPT | Performed by: EMERGENCY MEDICINE

## 2019-11-01 PROCEDURE — 25800030 ZZH RX IP 258 OP 636: Performed by: EMERGENCY MEDICINE

## 2019-11-01 RX ORDER — SODIUM CHLORIDE 9 MG/ML
1000 INJECTION, SOLUTION INTRAVENOUS CONTINUOUS
Status: DISCONTINUED | OUTPATIENT
Start: 2019-11-01 | End: 2019-11-01 | Stop reason: HOSPADM

## 2019-11-01 RX ADMIN — SODIUM CHLORIDE 1000 ML: 9 INJECTION, SOLUTION INTRAVENOUS at 11:23

## 2019-11-01 ASSESSMENT — ENCOUNTER SYMPTOMS
NAUSEA: 1
FEVER: 0
APPETITE CHANGE: 1
ABDOMINAL PAIN: 1

## 2019-11-01 ASSESSMENT — MIFFLIN-ST. JEOR: SCORE: 1134.63
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
## 2019-11-01 NOTE — ED TRIAGE NOTES
Pt c/o vaginal bleeding today, c/o LLQ pain for 2-3 days. Pt is 7 weeks pregnant. One previous miscarriage, no living children.

## 2019-11-01 NOTE — TELEPHONE ENCOUNTER
"Patient states is 7 weeks pregnant. [facility] 6/2020 by report. Pregnancy #2.  States was seen x 2 \"last weekend in ED for Left low abdomen cramps.   Reports cramping has continued. States in past 2 days has had \"new stomach pains 7-8\" on a 1-10 pain scale.    Currently reports \"20 minutes ago had moderate to large gush of bright red vaginal blood in toilet.\" No clots.  Continues to have vaginal bleeding.  States feels very light headed when standing.   Denies nausea. Denies vomiting.   Tolerating sips only of fluids.  Diarrhea x 1 this morning.  Afebrile by report.    Protocol-  Pregnancy- Vaginal Bleeding- Adult  Care advice reviewed.   Disposition-  Go to ED now.  Caller states understanding of the recommended disposition.   Has a ride and plans to go to [facility] ED.  Advised to call back if further questions or concerns.     JACKIE OrozcoN RN  [facility]     Reason for Disposition    SEVERE dizziness (e.g., unable to stand, requires support to walk, feels like passing out)    Additional Information    Negative: Shock suspected (e.g., cold/pale/clammy skin, too weak to stand, low BP, rapid pulse)    Negative: Difficult to awaken or acting confused (e.g., disoriented, slurred speech)    Negative: Passed out (i.e., fainted, collapsed and was not responding)    Negative: Sounds like a life-threatening emergency to the triager    Negative: SEVERE abdominal pain (e.g., excruciating)    Negative: SEVERE vaginal bleeding (i.e., soaking 2 pads / hour, large blood clots) and present for 2 or more hours    Protocols used: PREGNANCY - VAGINAL BLEEDING LESS THAN 20 WEEKS EGA-A-OH    "

## 2019-11-01 NOTE — ED AVS SNAPSHOT
[facility] Emergency Department  [address]  [facility] [address]  Phone:  [phone]  Fax:  [phone]                                    Qian Loo   MRN: [id number]    Department:  [facility] Emergency Department   Date of Visit:  11/1/2019           After Visit Summary Signature Page    I have received my discharge instructions, and my questions have been answered. I have discussed any challenges I see with this plan with the nurse or doctor.    ..........................................................................................................................................  Patient/Patient Representative Signature      ..........................................................................................................................................  Patient Representative Print Name and Relationship to Patient    ..................................................               ................................................  Date                                   Time    ..........................................................................................................................................  Reviewed by Signature/Title    ...................................................              ..............................................  Date                                               Time          22EPIC Rev 08/18

## 2019-11-03 LAB
C TRACH DNA SPEC QL NAA+PROBE: NEGATIVE
N GONORRHOEA DNA SPEC QL NAA+PROBE: NEGATIVE
SPECIMEN SOURCE: NORMAL
SPECIMEN SOURCE: NORMAL

## 2019-11-05 ENCOUNTER — HEALTH MAINTENANCE LETTER (OUTPATIENT)
Age: 24
End: 2019-11-05

## 2019-11-09 ENCOUNTER — APPOINTMENT (OUTPATIENT)
Dept: ULTRASOUND IMAGING | Facility: CLINIC | Age: 24
End: 2019-11-09
Attending: EMERGENCY MEDICINE
Payer: COMMERCIAL

## 2019-11-09 ENCOUNTER — HOSPITAL ENCOUNTER (EMERGENCY)
Facility: CLINIC | Age: 24
Discharge: HOME | End: 2019-11-09
Attending: EMERGENCY MEDICINE | Admitting: EMERGENCY MEDICINE
Payer: COMMERCIAL

## 2019-11-09 VITALS
RESPIRATION RATE: 16 BRPM | DIASTOLIC BLOOD PRESSURE: 81 MMHG | SYSTOLIC BLOOD PRESSURE: 113 MMHG | OXYGEN SATURATION: 99 % | TEMPERATURE: 97.7 F

## 2019-11-09 DIAGNOSIS — O20.9 VAGINAL BLEEDING IN PREGNANCY, FIRST TRIMESTER: ICD-10-CM

## 2019-11-09 DIAGNOSIS — O21.0 HYPEREMESIS GRAVIDARUM: ICD-10-CM

## 2019-11-09 LAB
B-HCG SERPL-ACNC: ABNORMAL IU/L (ref 0–5)
ERYTHROCYTE [DISTWIDTH] IN BLOOD BY AUTOMATED COUNT: 11.5 % (ref 10–15)
HCT VFR BLD AUTO: 37.5 % (ref 35–47)
HGB BLD-MCNC: 13 G/DL (ref 11.7–15.7)
MCH RBC QN AUTO: 31.2 PG (ref 26.5–33)
MCHC RBC AUTO-ENTMCNC: 34.7 G/DL (ref 31.5–36.5)
MCV RBC AUTO: 90 FL (ref 78–100)
PLATELET # BLD AUTO: 232 10E9/L (ref 150–450)
RBC # BLD AUTO: 4.17 10E12/L (ref 3.8–5.2)
WBC # BLD AUTO: 9.5 10E9/L (ref 4–11)

## 2019-11-09 PROCEDURE — 85027 COMPLETE CBC AUTOMATED: CPT | Performed by: EMERGENCY MEDICINE

## 2019-11-09 PROCEDURE — 84702 CHORIONIC GONADOTROPIN TEST: CPT | Performed by: EMERGENCY MEDICINE

## 2019-11-09 PROCEDURE — 99284 EMERGENCY DEPT VISIT MOD MDM: CPT | Mod: 25

## 2019-11-09 PROCEDURE — 76801 OB US < 14 WKS SINGLE FETUS: CPT

## 2019-11-09 PROCEDURE — 36415 COLL VENOUS BLD VENIPUNCTURE: CPT | Performed by: EMERGENCY MEDICINE

## 2019-11-09 ASSESSMENT — ENCOUNTER SYMPTOMS
ABDOMINAL PAIN: 1
VOMITING: 1

## 2019-11-09 NOTE — ED TRIAGE NOTES
Pt states abdominal pain and vaginal bleeding tonight. States approx 9 weeks gestation. Has seen OB for pregnancy but has not yet had ultrasound. ABCs intact GCS 15

## 2019-11-09 NOTE — ED AVS SNAPSHOT
[facility] Emergency Department  [address]  [facility] [address]  Phone:  [phone]  Fax:  [phone]                                    Qian Loo   MRN: [id number]    Department:  [facility] Emergency Department   Date of Visit:  11/9/2019           After Visit Summary Signature Page    I have received my discharge instructions, and my questions have been answered. I have discussed any challenges I see with this plan with the nurse or doctor.    ..........................................................................................................................................  Patient/Patient Representative Signature      ..........................................................................................................................................  Patient Representative Print Name and Relationship to Patient    ..................................................               ................................................  Date                                   Time    ..........................................................................................................................................  Reviewed by Signature/Title    ...................................................              ..............................................  Date                                               Time          22EPIC Rev 08/18

## 2019-11-09 NOTE — ED PROVIDER NOTES
History     Chief Complaint:  Vaginal Bleeding    HPI  Qian Loo is a 24 year old  9 weeks pregnant female who presents to the emergency department today for evaluation of vaginal bleeding. She woke up tonight with severe abdominal pain, vomiting, and dark red vaginal bleeding. Her pain has resolved but she continues to have bleeding. She has been struggling with nausea during her pregnancy and receives home infusions to stay hydrated.     Allergies:  No known drug allergies    Medications:    Desogestrel-ethinyl   Unisom  Reglan    Past Medical History:    Depression  Acne  Hyperemesis gravidarum    Past Surgical History:    No known drug allergies    Family History:    The patient's family history includes Unknown/Adopted in her father, maternal grandfather, maternal grandmother, mother, and another family member. She was adopted.    Social History:  The patient reports that she has never smoked. She has never used smokeless tobacco. She reports previous alcohol use. She reports current drug use.   PCP: Lora Garcia    Review of Systems   Gastrointestinal: Positive for abdominal pain and vomiting.   Genitourinary: Positive for vaginal bleeding.   All other systems reviewed and are negative.      Physical Exam     Patient Vitals for the past 24 hrs:   BP Temp Temp src Heart Rate Resp SpO2   19 0541 -- -- -- -- 16 --   19 0334 113/81 97.7  F (36.5  C) Temporal 64 18 99 %     Physical Exam  Nursing note and vitals reviewed.  Constitutional: Cooperative. Struggling with hyperemesis gravidarum   HENT:   Mouth/Throat: Mucous membranes are normal.   Cardiovascular: Normal rate, regular rhythm and normal heart sounds.  No murmur.  Pulmonary/Chest: Effort normal and breath sounds normal. No respiratory distress. No wheezes. No rales.   Abdominal: Soft. Normal appearance and bowel sounds are normal. No distension. There is no tenderness. There is no rigidity and no guarding.   Neurological:  Alert.   Skin: Skin is warm and dry.   Psychiatric: Normal mood and affect.     Emergency Department Course     Imaging:  Radiology findings were communicated with the patient who voiced understanding of the findings.  US OB <14 weeks multiple  CONCLUSION:   1.  Twin live diamniotic dichorionic pregnancy.  2.  The estimated gestational age is approximately 8 weeks 0 days based upon crown-rump length measurement on this study. The estimated date of delivery is 2020.  3.  Tiny subchorionic hemorrhage  Reading per radiology    Laboratory:  Laboratory findings were communicated with the patient who voiced understanding of the findings.    CBC:  o/w WNL. (WBC 9.5, HGB 13.0, )     HCG Quantitative: 165,975 (H)    Emergency Department Course:  Past medical records, nursing notes, and vitals reviewed.  0345: I performed an exam of the patient and obtained history, as documented above.     IV was inserted and blood was drawn for laboratory testing, results above.  The patient was sent for a US OB while in the emergency department, findings above.    0456: I rechecked the patient. Explained findings to patient.    I personally reviewed the laboratory/imaging results with the Patient and answered all related questions prior to discharge.    0521: I rechecked the patient.  Findings and plan explained to the Patient and significant other. Patient discharged home with instructions regarding supportive care, medications, and reasons to return. The importance of close follow-up was reviewed.     Impression & Plan      Medical Decision Making:  Qian Loo is a 24 year old  at approximately 9 weeks with a twin gestation female who presents to the emergency department today for evaluation of continued hyperemesis gravidarum as well as subvaginal bleeding. She initially had lower abdominal cramping which has since resolved. Ultrasound shows a viable twin pregnancy with a tiny subchorionic hemorrhage that is  stable compared to previous scans. Blood counts are reassuring and her HCG is trending up. Blood type B+.   She is essentially asymptomatic here and is already set up for infusion as she has been struggling with the hyperemesis and has nausea medications at home. NO indication for further workup or concern for an acute surgical process at this time. She will be discharged home with appropriate follow up instructions.     Diagnosis:    ICD-10-CM   1. Hyperemesis gravidarum O21.0   2. Vaginal bleeding in pregnancy, first trimester O20.9       Disposition:   discharged to home      Scribe Disclosure:  Gloria MARTINEZ, am serving as a scribe at 3:45 AM on 11/9/2019 to document services personally performed by Amadou Ley MD based on my observations and the provider's statements to me.    [facility] EMERGENCY DEPARTMENT       Amadou Ley MD  11/09/19 0642

## 2019-11-10 ENCOUNTER — HOSPITAL ENCOUNTER (INPATIENT)
Facility: CLINIC | Age: 24
LOS: 2 days | Discharge: HOME | DRG: 832 | End: 2019-11-12
Attending: EMERGENCY MEDICINE | Admitting: PSYCHIATRY & NEUROLOGY
Payer: COMMERCIAL

## 2019-11-10 DIAGNOSIS — F33.41 MAJOR DEPRESSIVE DISORDER, RECURRENT EPISODE, IN PARTIAL REMISSION (H): Primary | ICD-10-CM

## 2019-11-10 DIAGNOSIS — R45.851 SUICIDAL IDEATION: ICD-10-CM

## 2019-11-10 DIAGNOSIS — O99.341: ICD-10-CM

## 2019-11-10 DIAGNOSIS — O21.0 MILD HYPEREMESIS GRAVIDARUM, DELIVERED: ICD-10-CM

## 2019-11-10 DIAGNOSIS — Z3A.08 8 WEEKS GESTATION OF PREGNANCY: ICD-10-CM

## 2019-11-10 DIAGNOSIS — O21.0 MORNING SICKNESS: ICD-10-CM

## 2019-11-10 LAB
ALBUMIN UR-MCNC: 30 MG/DL
AMPHETAMINES UR QL SCN: NEGATIVE
ANION GAP SERPL CALCULATED.3IONS-SCNC: 10 MMOL/L (ref 3–14)
APAP SERPL-MCNC: <2 MG/L (ref 10–20)
APPEARANCE UR: ABNORMAL
BACTERIA #/AREA URNS HPF: ABNORMAL /HPF
BARBITURATES UR QL: NEGATIVE
BASOPHILS # BLD AUTO: 0 10E9/L (ref 0–0.2)
BASOPHILS NFR BLD AUTO: 0.1 %
BENZODIAZ UR QL: NEGATIVE
BILIRUB UR QL STRIP: NEGATIVE
BUN SERPL-MCNC: 4 MG/DL (ref 7–30)
CALCIUM SERPL-MCNC: 8.8 MG/DL (ref 8.5–10.1)
CANNABINOIDS UR QL SCN: POSITIVE
CHLORIDE SERPL-SCNC: 99 MMOL/L (ref 94–109)
CO2 SERPL-SCNC: 25 MMOL/L (ref 20–32)
COCAINE UR QL: NEGATIVE
COLOR UR AUTO: YELLOW
CREAT SERPL-MCNC: 0.43 MG/DL (ref 0.52–1.04)
DIFFERENTIAL METHOD BLD: ABNORMAL
EOSINOPHIL # BLD AUTO: 0 10E9/L (ref 0–0.7)
EOSINOPHIL NFR BLD AUTO: 0.1 %
ERYTHROCYTE [DISTWIDTH] IN BLOOD BY AUTOMATED COUNT: 11.5 % (ref 10–15)
ETHANOL UR QL SCN: NEGATIVE
GFR SERPL CREATININE-BSD FRML MDRD: >90 ML/MIN/{1.73_M2}
GLUCOSE SERPL-MCNC: 109 MG/DL (ref 70–99)
GLUCOSE UR STRIP-MCNC: NEGATIVE MG/DL
HCG UR QL: POSITIVE
HCT VFR BLD AUTO: 39.3 % (ref 35–47)
HGB BLD-MCNC: 13.8 G/DL (ref 11.7–15.7)
HGB UR QL STRIP: NEGATIVE
IMM GRANULOCYTES # BLD: 0 10E9/L (ref 0–0.4)
IMM GRANULOCYTES NFR BLD: 0.2 %
KETONES UR STRIP-MCNC: >150 MG/DL
LEUKOCYTE ESTERASE UR QL STRIP: ABNORMAL
LYMPHOCYTES # BLD AUTO: 3.1 10E9/L (ref 0.8–5.3)
LYMPHOCYTES NFR BLD AUTO: 26 %
MCH RBC QN AUTO: 31.3 PG (ref 26.5–33)
MCHC RBC AUTO-ENTMCNC: 35.1 G/DL (ref 31.5–36.5)
MCV RBC AUTO: 89 FL (ref 78–100)
MONOCYTES # BLD AUTO: 0.9 10E9/L (ref 0–1.3)
MONOCYTES NFR BLD AUTO: 7.4 %
MUCOUS THREADS #/AREA URNS LPF: PRESENT /LPF
NEUTROPHILS # BLD AUTO: 7.8 10E9/L (ref 1.6–8.3)
NEUTROPHILS NFR BLD AUTO: 66.2 %
NITRATE UR QL: NEGATIVE
NRBC # BLD AUTO: 0 10*3/UL
NRBC BLD AUTO-RTO: 0 /100
OPIATES UR QL SCN: NEGATIVE
PH UR STRIP: 6 PH (ref 5–7)
PLATELET # BLD AUTO: 292 10E9/L (ref 150–450)
POTASSIUM SERPL-SCNC: 3.2 MMOL/L (ref 3.4–5.3)
RBC # BLD AUTO: 4.41 10E12/L (ref 3.8–5.2)
RBC #/AREA URNS AUTO: 4 /HPF (ref 0–2)
SALICYLATES SERPL-MCNC: <2 MG/DL
SODIUM SERPL-SCNC: 134 MMOL/L (ref 133–144)
SOURCE: ABNORMAL
SP GR UR STRIP: >1.035 (ref 1–1.03)
SQUAMOUS #/AREA URNS AUTO: 12 /HPF (ref 0–1)
UROBILINOGEN UR STRIP-MCNC: NORMAL MG/DL (ref 0–2)
WBC # BLD AUTO: 11.8 10E9/L (ref 4–11)
WBC #/AREA URNS AUTO: 9 /HPF (ref 0–5)

## 2019-11-10 PROCEDURE — 99285 EMERGENCY DEPT VISIT HI MDM: CPT | Mod: Z6 | Performed by: EMERGENCY MEDICINE

## 2019-11-10 PROCEDURE — 85025 COMPLETE CBC W/AUTO DIFF WBC: CPT | Performed by: EMERGENCY MEDICINE

## 2019-11-10 PROCEDURE — 25000132 ZZH RX MED GY IP 250 OP 250 PS 637: Performed by: NURSE PRACTITIONER

## 2019-11-10 PROCEDURE — 80329 ANALGESICS NON-OPIOID 1 OR 2: CPT | Performed by: EMERGENCY MEDICINE

## 2019-11-10 PROCEDURE — 25000132 ZZH RX MED GY IP 250 OP 250 PS 637: Performed by: EMERGENCY MEDICINE

## 2019-11-10 PROCEDURE — 99285 EMERGENCY DEPT VISIT HI MDM: CPT | Mod: 25

## 2019-11-10 PROCEDURE — 80320 DRUG SCREEN QUANTALCOHOLS: CPT | Performed by: FAMILY MEDICINE

## 2019-11-10 PROCEDURE — 12400001 ZZH R&B MH UMMC

## 2019-11-10 PROCEDURE — 81001 URINALYSIS AUTO W/SCOPE: CPT | Performed by: EMERGENCY MEDICINE

## 2019-11-10 PROCEDURE — 81025 URINE PREGNANCY TEST: CPT | Performed by: EMERGENCY MEDICINE

## 2019-11-10 PROCEDURE — 80048 BASIC METABOLIC PNL TOTAL CA: CPT | Performed by: EMERGENCY MEDICINE

## 2019-11-10 PROCEDURE — 80307 DRUG TEST PRSMV CHEM ANLYZR: CPT | Performed by: FAMILY MEDICINE

## 2019-11-10 PROCEDURE — 84703 CHORIONIC GONADOTROPIN ASSAY: CPT | Performed by: EMERGENCY MEDICINE

## 2019-11-10 RX ORDER — ACETAMINOPHEN 325 MG/1
650 TABLET ORAL EVERY 4 HOURS PRN
Status: DISCONTINUED | OUTPATIENT
Start: 2019-11-10 | End: 2019-11-12

## 2019-11-10 RX ORDER — HYDROXYZINE HYDROCHLORIDE 25 MG/1
25 TABLET, FILM COATED ORAL EVERY 4 HOURS PRN
Status: DISCONTINUED | OUTPATIENT
Start: 2019-11-10 | End: 2019-11-10

## 2019-11-10 RX ORDER — PYRIDOXINE HCL (VITAMIN B6) 25 MG
25 TABLET ORAL 2 TIMES DAILY
Status: DISCONTINUED | OUTPATIENT
Start: 2019-11-10 | End: 2019-11-12 | Stop reason: HOSPADM

## 2019-11-10 RX ORDER — METOCLOPRAMIDE 10 MG/1
10 TABLET ORAL 3 TIMES DAILY PRN
Status: DISCONTINUED | OUTPATIENT
Start: 2019-11-10 | End: 2019-11-12

## 2019-11-10 RX ORDER — ACETAMINOPHEN 500 MG
1000 TABLET ORAL ONCE
Status: COMPLETED | OUTPATIENT
Start: 2019-11-10 | End: 2019-11-10

## 2019-11-10 RX ADMIN — ACETAMINOPHEN 650 MG: 325 TABLET, FILM COATED ORAL at 23:37

## 2019-11-10 RX ADMIN — Medication 25 MG: at 23:37

## 2019-11-10 RX ADMIN — ACETAMINOPHEN 1000 MG: 500 TABLET ORAL at 20:07

## 2019-11-10 RX ADMIN — Medication 12.5 MG: at 23:37

## 2019-11-10 RX ADMIN — METOCLOPRAMIDE 10 MG: 10 TABLET ORAL at 22:47

## 2019-11-10 ASSESSMENT — ACTIVITIES OF DAILY LIVING (ADL)
AMBULATION: 0-->INDEPENDENT
ORAL_HYGIENE: INDEPENDENT
COGNITION: 0 - NO COGNITION ISSUES REPORTED
RETIRED_COMMUNICATION: 0-->UNDERSTANDS/COMMUNICATES WITHOUT DIFFICULTY
TOILETING: 0-->INDEPENDENT
DRESS: 0-->INDEPENDENT
LAUNDRY: WITH SUPERVISION
DRESS: INDEPENDENT
FALL_HISTORY_WITHIN_LAST_SIX_MONTHS: NO
TRANSFERRING: 0-->INDEPENDENT
RETIRED_EATING: 0-->INDEPENDENT
SWALLOWING: 0-->SWALLOWS FOODS/LIQUIDS WITHOUT DIFFICULTY
BATHING: 0-->INDEPENDENT
HYGIENE/GROOMING: INDEPENDENT

## 2019-11-10 ASSESSMENT — MIFFLIN-ST. JEOR: SCORE: 1122.83

## 2019-11-10 ASSESSMENT — ENCOUNTER SYMPTOMS
ABDOMINAL PAIN: 0
FEVER: 0
HALLUCINATIONS: 0
SHORTNESS OF BREATH: 0
CHILLS: 0
DIZZINESS: 0
LIGHT-HEADEDNESS: 0

## 2019-11-11 LAB
CHOLEST SERPL-MCNC: 106 MG/DL
HDLC SERPL-MCNC: 37 MG/DL
LDLC SERPL CALC-MCNC: 52 MG/DL
NONHDLC SERPL-MCNC: 69 MG/DL
T4 FREE SERPL-MCNC: 1.94 NG/DL (ref 0.76–1.46)
TRIGL SERPL-MCNC: 84 MG/DL
TSH SERPL DL<=0.005 MIU/L-ACNC: 0.04 MU/L (ref 0.4–4)

## 2019-11-11 PROCEDURE — 99223 1ST HOSP IP/OBS HIGH 75: CPT | Mod: AI | Performed by: PSYCHIATRY & NEUROLOGY

## 2019-11-11 PROCEDURE — 25000132 ZZH RX MED GY IP 250 OP 250 PS 637: Performed by: NURSE PRACTITIONER

## 2019-11-11 PROCEDURE — 84439 ASSAY OF FREE THYROXINE: CPT | Performed by: NURSE PRACTITIONER

## 2019-11-11 PROCEDURE — 80061 LIPID PANEL: CPT | Performed by: NURSE PRACTITIONER

## 2019-11-11 PROCEDURE — G0177 OPPS/PHP; TRAIN & EDUC SERV: HCPCS

## 2019-11-11 PROCEDURE — 12400001 ZZH R&B MH UMMC

## 2019-11-11 PROCEDURE — 36415 COLL VENOUS BLD VENIPUNCTURE: CPT | Performed by: NURSE PRACTITIONER

## 2019-11-11 PROCEDURE — 84443 ASSAY THYROID STIM HORMONE: CPT | Performed by: NURSE PRACTITIONER

## 2019-11-11 PROCEDURE — 25000132 ZZH RX MED GY IP 250 OP 250 PS 637: Performed by: PSYCHIATRY & NEUROLOGY

## 2019-11-11 RX ORDER — QUETIAPINE FUMARATE 50 MG/1
50 TABLET, FILM COATED ORAL AT BEDTIME
Status: DISCONTINUED | OUTPATIENT
Start: 2019-11-11 | End: 2019-11-12 | Stop reason: HOSPADM

## 2019-11-11 RX ADMIN — FLUOXETINE 20 MG: 20 CAPSULE ORAL at 13:21

## 2019-11-11 RX ADMIN — Medication 25 MG: at 19:04

## 2019-11-11 RX ADMIN — QUETIAPINE FUMARATE 50 MG: 50 TABLET ORAL at 21:03

## 2019-11-11 RX ADMIN — Medication 25 MG: at 09:00

## 2019-11-11 RX ADMIN — METOCLOPRAMIDE 10 MG: 10 TABLET ORAL at 21:04

## 2019-11-11 RX ADMIN — Medication 12.5 MG: at 19:05

## 2019-11-11 RX ADMIN — ACETAMINOPHEN 650 MG: 325 TABLET, FILM COATED ORAL at 19:35

## 2019-11-11 RX ADMIN — Medication 12.5 MG: at 13:21

## 2019-11-11 RX ADMIN — ACETAMINOPHEN 650 MG: 325 TABLET, FILM COATED ORAL at 08:10

## 2019-11-11 RX ADMIN — METOCLOPRAMIDE 10 MG: 10 TABLET ORAL at 16:25

## 2019-11-11 RX ADMIN — METOCLOPRAMIDE 10 MG: 10 TABLET ORAL at 08:10

## 2019-11-11 RX ADMIN — ACETAMINOPHEN 650 MG: 325 TABLET, FILM COATED ORAL at 16:25

## 2019-11-11 ASSESSMENT — ACTIVITIES OF DAILY LIVING (ADL)
DRESS: INDEPENDENT
ORAL_HYGIENE: INDEPENDENT
DRESS: INDEPENDENT
LAUNDRY: WITH SUPERVISION
LAUNDRY: WITH SUPERVISION
ORAL_HYGIENE: INDEPENDENT
HYGIENE/GROOMING: INDEPENDENT
HYGIENE/GROOMING: INDEPENDENT

## 2019-11-11 NOTE — ED PROVIDER NOTES
History     Chief Complaint   Patient presents with     Suicidal     HPI  Qian Loo is a 24 year old female,  at 8w4d with twin gestation per LMP: 2019 with a history of depression and allergic rhinitis, who presents to the Emergency Department for evaluation of suicidal ideations.      Patient presents with worsening of some baseline depression.  She reports she has had previous suicidal ideation and attempts in the past but had been doing okay until recently she has had more psychosocial stressors with her significant other who is reportedly quite manipulative and it was a reportedly somewhat toxic situation for the patient.  She has since ended that relationship and is currently moved out from that significant other and is back living with her parents.  She also recently learned also of being pregnant with twins. She reportedly did not want to keep the pregnancy, however the significant other was reportedly pressuring the patient to keep the child and this was also significantly worsening her mental health type symptoms.  Reports that she feels depressed to some degree always, but the SI is much worse now and she is even having SI with plan (overdose with medications).  She denies any self-injurious behaviors, denies taking any medications, ingestions, etc.  No cutting behaviors.  She reports that she will very rarely/occasionally use marijuana but otherwise denies alcohol or other drugs or substances. Despite her worsening depression and SI with plan, she denies any HI.  No hallucinations or manic symptoms.  She reports that she recently made a plan to have an  with regards to her current pregnancy and that is scheduled, but despite having a plan to have that part of her current stressful situation managed, she reports that her suicidal ideation and plan has continued despite this.    Patient presents today looking for assistance, is willing and would like to be admitted as she is  "physically and emotionally quite concerned that she actually would harm herself at this time.  Physically, she reports having some nausea in the setting of this early pregnancy but no shanta pain (see additional OB history below). Otherwise, no fevers, chills, lightheadedness, dizziness, syncope or near syncope.  No chest pain or breathing symptoms.  No other new symptoms or complaints at this time.  Please see ROS for further details    Per chart review, patient was seen in the ED yesterday for hyperemesis gravidarum and vaginal bleeding. She had a normal CBC and quantitative HCG. She is blood type B+. She had a normal ultrasound showing \"viable twin pregnancy with a tiny subchorionic hemorrhage that is stable compared to previous scans.\" She did not require any interventions while there as symptoms mostly resolved. She already had outpatient infusion arranged and she was discharged in stable condition.     US OB < 14 WEEKS MULTIPLE (11/9/2019) CONCLUSION:  1.  Twin live diamniotic dichorionic pregnancy.  2.  The estimated gestational age is approximately 8 weeks 0 days based upon crown-rump length measurement on this study. The estimated date of delivery is 6/21/2020.  3.  Tiny subchorionic hemorrhage    I have reviewed the Medications, Allergies, Past Medical and Surgical History, and Social History in the [facility] [employer] system.    Past Medical History:   Diagnosis Date     NO ACTIVE PROBLEMS        History reviewed. No pertinent surgical history.    Family History   Adopted: Yes   Problem Relation Age of Onset     Unknown/Adopted Mother      Unknown/Adopted Father      Unknown/Adopted Maternal Grandmother      Unknown/Adopted Maternal Grandfather      Unknown/Adopted Other        Social History     Tobacco Use     Smoking status: Never Smoker     Smokeless tobacco: Never Used     Tobacco comment: non smoking home   Substance Use Topics     Alcohol use: Not Currently     Comment: 2-3 times per month, 1/2 beer each time "     No current facility-administered medications for this encounter.      Current Outpatient Medications   Medication     doxylamine (UNISOM) 25 MG TABS tablet     metoclopramide (REGLAN) 10 MG tablet     ondansetron (ZOFRAN ODT) 4 MG ODT tab     vitamin B6 (PYRIDOXINE) 25 MG tablet        Allergies   Allergen Reactions     No Known Drug Allergies      Seasonal Allergies        Review of Systems   Constitutional: Negative for chills and fever.   HENT: Negative.    Respiratory: Negative for cough and shortness of breath.    Cardiovascular: Negative for chest pain and palpitations.   Gastrointestinal: Positive for nausea (in setting of early pregnancy). Negative for abdominal pain, blood in stool, constipation, diarrhea and vomiting.   Genitourinary: Negative for dysuria, frequency, hematuria, vaginal bleeding and vaginal discharge.   Musculoskeletal: Negative for neck stiffness.   Skin: Negative for color change and rash.   Allergic/Immunologic: Negative for immunocompromised state.   Neurological: Negative for dizziness, syncope, weakness and light-headedness.   Psychiatric/Behavioral: Positive for suicidal ideas. Negative for hallucinations and self-injury. The patient is nervous/anxious.         Positive for depression.   All other systems reviewed and are negative.      Physical Exam   BP: 115/81  Pulse: 103  Temp: 98.4  F (36.9  C)  Resp: 18  SpO2: 100 %      Physical Exam  CONSTITUTIONAL: Well-developed and well-nourished. Awake and alert. Non-toxic appearance. No acute distress.   HENT:   - Head: Normocephalic and atraumatic.   - Ears: Hearing and external ear grossly normal.   - Nose: Nose normal. No rhinorrhea. No epistaxis.   - Mouth/Throat: MMM  EYES: Conjunctivae and lids are normal. No scleral icterus.   NECK: Normal range of motion and phonation normal. Neck supple.  No tracheal deviation, no stridor. No edema or erythema noted.  CARDIOVASCULAR: Normal rate, regular rhythm   PULMONARY/CHEST: Normal work  of breathing. No accessory muscle usage or stridor. No respiratory distress.    ABDOMEN: Soft, non-distended. No tenderness. No rigidity, rebound or guarding.   MUSCULOSKELETAL: Extremities warm and seemingly well perfused. Moves extremities normally, normal gait  NEUROLOGIC: Awake, alert. Not disoriented. She displays no atrophy and no tremor. Normal tone. No seizure activity. GCS 15  SKIN: Skin is warm and dry. No rash noted. No diaphoresis. No pallor.   PSYCHIATRIC: Normal mood and affect. Speech and behavior normal. Thought processes linear. Cognition and memory are normal.       Assessments & Plan (with Medical Decision Making)   IMPRESSION: 24 year old female w/ PMH notable for  at 8w4d with twin gestation per LMP (2019) with a history of depression and allergic rhinitis, who presents to the ED for evaluation of worsening depression and SI w/ plan.  Clinically, patient appears somewhat tearful. Vitals WNL. Otherwise on examination, no acute findings.  Reports some nausea associated with early pregnancy abdominal exam benign/no acute findings.  Does not appear intoxicated.    Given the patient's worsening depression and now SI with plan, previous self-harm behaviors, etc. I do think she would benefit from admission especially as 1 of those major stressors as her current pregnancy which is still ongoing.  Discussed this with the patient and she is agreeable to come in for admission.  That said, no clear findings for deborah, hallucinations, substance use/intoxication (outside of occasional marijuana), no outward findings for trauma or immediate abnormal medical findings.  No HI.     PLAN: Admit to Psych after medical clearance labs (patient voluntary).  Labs, urine studies.    RESULTS:  - Labs: Slight WBC elevation and mild hypokalemia, otherwise unremarkable  --- Negative acetaminophen and salicylate levels  - Urine: UDS positive for cannabinoids. UA looks like could be contaminated, more squamous  cells and WBCs, only small LE and negative nitrites.  As patient not having urine symptoms think unlikely to be true infection.    INTERVENTIONS:   - Given PO food/snacks    RE-EVALUATION:  - Pt otherwise continues to do well here in the ED, no acute issues or apparent concerning changes in vitals or clinical appearance.    DISCUSSIONS:  - w/ BH Intake: Reviewed case (including pregnant status) and reason for presentation. They will arrange admission.  - w/ OB: Informed of pt getting admitted to Psych and pt plan for outpatient elective AB, but this way they're aware of patient should anything change more immediately while in hospital    - w/ Patient: I have reviewed the available findings, plan with the patient and her parents. They expressed understanding and agreement with this plan. All questions answered to the best of our ability at this time. Pt amenable to admission.     DISPOSITION/PLANNING:  - IMPRESSION: SI w/ Plan (Overdose), Positive pregnancy (See EMR for details). Patient voluntary.   - DISPOSITION: Admit to Psych for further evaluation/management      ______________________________________________________________________        11/10/2019   [facility], EMERGENCY DEPARTMENT     Kathy Stiles MD  11/12/19 9691

## 2019-11-11 NOTE — PROGRESS NOTES
"   11/11/19 1444   General Information   Date Initially Attended OT 11/11/19     Groups Attended: OT Clinic     Affect/Hygiene/Presentation: Calm/pleasant mood, engaged, flat affect. No apparent hygiene concerns.     Patient Performance/Response: Pt actively participated in occupational therapy clinic. Pt was able to ask for assistance with prompting, and independently engage in a novel, goal-directed task after initial orientation to clinic materials was provided by writer. Pt demonstrated good focus, planning, and attention to detail during task completion. Pt appeared comfortable interacting with writer when conversation was initiated, however she did not interact with peers and generally kept to herself. Pt verbalized to writer that she enjoyed the group, and that \"this has been the best part so far\". Writer will continue to encourage group participation.     Plan: More information needed to complete OT Initial Assessment; OT staff will meet with pt to review the role of occupational therapy and explain the value of having them involved in their treatment plan including options to meet current needs/self-identified goals. As group attendance is established, Pt will be given self-assessment to inform OT initial assessment.       "

## 2019-11-11 NOTE — PROGRESS NOTES
"CLINICAL NUTRITION SERVICES - ASSESSMENT NOTE     Nutrition Prescription    RECOMMENDATIONS FOR MDs/PROVIDERS TO ORDER:  Encourage fluids and soft bland foods     Malnutrition Status:    Severe malnutrition in the context of acute on chronic illness     Recommendations already ordered by Registered Dietitian (RD):  None at this time- pt declined     Future/Additional Recommendations:  Monitor mental health status, pt's PO/appetite, nausea, and weight trends  Discuss nutrition supplements with pt as appropriate       REASON FOR ASSESSMENT  Qian Loo is a/an 24 year old female assessed by the dietitian for Admission Nutrition Risk Screen for reduced oral intake over the last month- N/V due to pregnancy     Per RN notes: Pt 8 weeks pregnant with twins. Pt has a scheduled  for this Wednesday.     NUTRITION HISTORY  Pt reports that she has been able to eat very little due to N/V. Qian also reports a history of bulimia and that she is unsure if that is also a factor w/ her N/V. Qian reports that she wants her babies out now and that she doesn't know what to do.   Pt crying and became upset during interview.     CURRENT NUTRITION ORDERS  Diet: Regular  Intake/Tolerance: Pt reports that she has been able to have some water and apple juice, she also reported eating 1/2 of a banana.     LABS  Labs reviewed  K 3.2 (L)  Cr 0.43 (L)    MEDICATIONS  Medications reviewed  Vit B6   PRN Reglan     ANTHROPOMETRICS  Height: 160 cm (5' 3\")  Most Recent Weight: 40.4 kg (89 lb)    IBW: 52.3 kg (77%)   BMI: Underweight BMI <18.5  Weight History: Wt loss of 4.5 kg (10%) over the past ~4 months.   Wt Readings from Last 10 Encounters:   11/10/19 40.4 kg (89 lb)   19 44.7 kg (98 lb 9.6 oz)   10/01/18 45.9 kg (101 lb 1.6 oz)   18 44.9 kg (99 lb)   18 45.2 kg (99 lb 11.2 oz)   18 42.5 kg (93 lb 12.8 oz)   18 42.3 kg (93 lb 4.8 oz)   17 42.7 kg (94 lb 3.2 oz)   17 43.5 kg (96 lb) "   10/25/16 45 kg (99 lb 1.6 oz)     Dosing Weight: 40 kg (actual)     ASSESSED NUTRITION NEEDS  Estimated Energy Needs: 6175-2850 kcals/day (30 - 35+ kcals/kg )  Justification: Repletion and Underweight  Estimated Protein Needs: 40-48 grams protein/day (1 - 1.2 grams of pro/kg)  Justification: Repletion  Estimated Fluid Needs: (1 mL/kcal)   Justification: Maintenance    PHYSICAL FINDINGS  See malnutrition section below.    MALNUTRITION  % Intake: < 75% for > 7 days (non-severe)  % Weight Loss: > 10% in 6 months (severe)  Subcutaneous Fat Loss: Facial region: Mild   Muscle Loss: Generalized- moderate  Fluid Accumulation/Edema: None noted  Malnutrition Diagnosis: Severe malnutrition in the context of acute on chronic illness     NUTRITION DIAGNOSIS  Inadequate oral intake related to poor appetite 2/2 mental health and eating disorder symptome as evidenced by pt report of poor PO, weight loss of 10% over the past 4 months, and BMI of 15.77 kg/m^2.     INTERVENTIONS  Implementation  Nutrition Education: Discussed current PO intake and role of RD. Encouraged intake of fluids and soft bland foods (bananas, oatmeal)      Goals  Patient to consume % of nutritionally adequate meal trays TID, or the equivalent with supplements/snacks.     Monitoring/Evaluation  Progress toward goals will be monitored and evaluated per protocol.    Tova High RD, LD  Unit pager: [phone]

## 2019-11-11 NOTE — ED NOTES
ED to Behavioral Floor Handoff    SITUATION  Qian Loo is a 24 year old female who speaks English and lives in a home with family members The patient arrived in the ED by private car from home with a complaint of Suicidal  .The patient's current symptoms started/worsened 1 month(s) ago and during this time the symptoms have increased.   In the ED, pt was diagnosed with   Final diagnoses:   Suicidal ideation        Initial vitals were: BP: 115/81  Pulse: 103  Temp: 98.4  F (36.9  C)  Resp: 18  SpO2: 100 %   --------  Is the patient diabetic? No   If yes, last blood glucose? --     If yes, was this treated in the ED? --  --------  Is the patient inebriated (ETOH) No or Impaired on other substances? Yes  MSSA done? N/A  Last MSSA score: --    Were withdrawal symptoms treated? N/A  Does the patient have a seizure history? No. If yes, date of most recent seizure--  --------  Is the patient patient experiencing suicidal ideation? reports suicidal ideation with out intention or a suicidal plan    Homicidal ideation? denies current or recent homicidal ideation or behaviors.    Self-injurious behavior/urges? reports current or recent self injurious behavior or ideation including overdose.  ------  Was pt aggressive in the ED No  Was a code called No  Is the pt now cooperative? No  -------  Meds given in ED:   Medications   acetaminophen (TYLENOL) tablet 1,000 mg (1,000 mg Oral Given 11/10/19 2007)      Family present during ED course? Yes  Family currently present? Yes    BACKGROUND  Does the patient have a cognitive impairment or developmental disability? No  Allergies:   Allergies   Allergen Reactions     No Known Drug Allergies      Seasonal Allergies    .   Social demographics are   Social History     Socioeconomic History     Marital status: Single     Spouse name: None     Number of children: None     Years of education: None     Highest education level: None   Occupational History     None   Social Needs      Financial resource strain: None     Food insecurity:     Worry: None     Inability: None     Transportation needs:     Medical: None     Non-medical: None   Tobacco Use     Smoking status: Never Smoker     Smokeless tobacco: Never Used     Tobacco comment: non smoking home   Substance and Sexual Activity     Alcohol use: Not Currently     Comment: 2-3 times per month, 1/2 beer each time     Drug use: Yes     Comment: marijuana     Sexual activity: Yes     Partners: Male     Birth control/protection: Condom   Lifestyle     Physical activity:     Days per week: None     Minutes per session: None     Stress: None   Relationships     Social connections:     Talks on phone: None     Gets together: None     Attends [employer] service: None     Active member of club or organization: None     Attends meetings of clubs or organizations: None     Relationship status: None     Intimate partner violence:     Fear of current or ex partner: None     Emotionally abused: None     Physically abused: None     Forced sexual activity: None   Other Topics Concern     Parent/sibling w/ CABG, MI or angioplasty before 65F 55M? Yes     Comment: Unknown   Social History Narrative    Born in [facility] in [facility].  Lived in orphanage in [place]. Immigrated to [place] at age 3.  Negative PPD.        ASSESSMENT  Labs results   Labs Ordered and Resulted from Time of ED Arrival Up to the Time of Departure from the ED   DRUG ABUSE SCREEN 6 CHEM DEP URINE ([facility]) - Abnormal; Notable for the following components:       Result Value    Cannabinoids Qual Urine Positive (*)     All other components within normal limits   CBC WITH PLATELETS DIFFERENTIAL - Abnormal; Notable for the following components:    WBC 11.8 (*)     All other components within normal limits   BASIC METABOLIC PANEL - Abnormal; Notable for the following components:    Potassium 3.2 (*)     Glucose 109 (*)     Urea Nitrogen 4 (*)     Creatinine 0.43 (*)     All other  components within normal limits   ACETAMINOPHEN LEVEL   SALICYLATE LEVEL   ROUTINE UA WITH MICROSCOPIC   HCG QUALITATIVE URINE      Imaging Studies: No results found for this or any previous visit (from the past 24 hour(s)).   Most recent vital signs /81   Pulse 103   Temp 98.4  F (36.9  C) (Oral)   Resp 18   LMP  (LMP Unknown)   SpO2 100%    Abnormal labs/tests/findings requiring intervention:---   Pain control: fair  Nausea control: fair    RECOMMENDATION  Are any infection precautions needed (MRSA, VRE, etc.)? No If yes, what infection? --  ---  Does the patient have mobility issues? independently. If yes, what device does the pt use? ---  ---  Is patient on 72 hour hold or commitment? No If on 72 hour hold, have hold and rights been given to patient? N/A  Are admitting orders written if after 10 p.m. ?N/A  Tasks needing to be completed:---     Daphney Hay RN    [phone] [facility]   [phone] [facility] ED

## 2019-11-11 NOTE — ED TRIAGE NOTES
Pt here with her parents.  Pt reports she just recently left a toxic relationship to move home with her parents.  Pt is 8wks preg with twins and recently has decided she wants to end the preg.  Pt has been having deepining depression for while now but recently it has reached a point that she is having active thoughts on how to end her life.  Pt denies having taken anything in an attempt to harm herself.

## 2019-11-11 NOTE — PROGRESS NOTES
"Admission Note:    MUNA Laughlin is a 24 yrs old female, adopted from [place] at age 3.5 yrs old. She was admitted to [address] from ED as a voluntary admission. Reason for admission: suicidal thoughts.    B. Patient reported a long history of depression and thoughts if suicide. She has been on Citalopram in the past, stopped taking it about a yr ago, as her BF convinced her that's he does not need it. She reported one previous attempt to take pills of Ibuprofen, but her BF stopped her from swallowing the, She did not seek medical or psychiatric attention then.   This is patient's first  admission. Patient has a biological sister, who is 2.5 yrs younger, and is mentally ill, \"taking pills, \"has therapy every week\".     A. Patient reported wishing to be dead now, stated \"It would be nice if I just don;t wake up in the morning\". She reported having passive thoughts of suicide to overdose if she has a chance. She contracted for safety on the unit. Patient stated: \"I feel like I want to hit my head on the wall\". She rated depression at 9/10, anxiety at 5/10. Patient reported having \"thoughts of demons\". \"Fear of presence of demons\", thinking that demons are feeding off her negative thoughts\", stated: 'I feel I have demons in me\". Denied hallucinations or HI.     Stress: \"It all started 3 weeks ago, when I found out I'm having twins\", \"I want these babies out on my body\", \"I can't eat or drink, \"I can't sleep\". \"I lost my job\", \"I don't see a future with my BF\", \"He is blaming me for getting pregnant\". \"I'm nauseous all the time\".     Support: parents, she moved with them 3 weeks ago, they told her they will support her with whatever she decides to do\".      CD: uses marijuana daily till 3 weeks ago, when she found out she is pregnant. Used in the last 4-5 yrs.     Thoughts were delusional about demons, with poor concentration. Mood was labile, patient cried few times during the interview, very depressed, and hopeless. " Speech was clear and organized. Pt appeared dehydrated, but clean and well groomed.     Pain: headache and abdominal pain: patient was given Tylenol and Reglan by assigned RN.     GEOVANNY. Patient has a scheduled  for this Wednesday at [facility] in [place].

## 2019-11-11 NOTE — PLAN OF CARE
BEHAVIORAL TEAM DISCUSSION    Participants: Dr. Thierno Ayala MD, Marianela Reyna RN, Mata Rodriguez LPC  Progress: New Admit  Anticipated length of stay: 7 days, to be reevaluated upon assessment  Continued Stay Criteria/Rationale: Evaluation and assessment  Medical/Physical: Pt reports increased cramps and abdominal pain, team is assessing for needed interventions.   Precautions:   Behavioral Orders   Procedures    Code 1 - Restrict to Unit    Routine Programming     As clinically indicated    Single Room     Keep in patient in single room because of vunerable mental state, and mood lability, and likely decompensation if forced to cohabitat with a roommate.    Status 15     Every 15 minutes.    Suicide precautions     Patients on Suicide Precautions should have a Combination Diet ordered that includes a Diet selection(s) AND a Behavioral Tray selection for Safe Tray - with utensils, or Safe Tray - NO utensils       Plan: Continued evaluation and assessment  Rationale for change in precautions or plan: None

## 2019-11-11 NOTE — PROGRESS NOTES
11/10/19 2237   Patient Belongings   Patient Belongings locker   Patient Belongings Put in Hospital Secure Location (Security or Locker, etc.) clothing   Belongings Search Yes   Clothing Search Yes   Second Staff Ali S.     -1 pair shoes  -1 pair socks  -1 coat  -No cash, cards, or cellphone upon admission.     A               Admission:  I am responsible for any personal items that are not sent to the safe or pharmacy.  [facility] is not responsible for loss, theft or damage of any property in my possession.    Signature:  _________________________________ Date: _______  Time: _____                                              Staff Signature:  ____________________________ Date: ________  Time: _____      2nd Staff person, if patient is unable/unwilling to sign:    Signature: ________________________________ Date: ________  Time: _____     Discharge:  [facility] has returned all of my personal belongings:    Signature: _________________________________ Date: ________  Time: _____                                          Staff Signature:  ____________________________ Date: ________  Time: _____
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
## 2019-11-11 NOTE — H&P
"[facility], [place]   Psychiatric History & Physical  Admission date: 11/10/2019        Chief Complaint:   \"I've been feeling horrible, I had thoughts of not wanting to live, I don't want these children inside me\"         HPI:     Qian is a  24 year old female with history of depression and anxiety who is admitted on a voluntary basis for worsening depressive moods, suicidal ideation with intent, in the context of finding out she was 9 weeks pregnant with twins. This is the patient's second pregnancy, the first one while apparently ended naturally. She had been experiencing depressive moods in the past, and had taken Celexa, however had stopped after a few months when she felt better, and had not been on antidepressants since then. She found out she was pregnant 3 weeks ago, at which point she was living with her boyfriend. She describes the boyfriend as \"toxic,\" and not supportive, and had blamed her for becoming pregnant. She tells me the boyfriend wanted her to keep the twin, however she felt this was being forced upon her. She moved back home to her adopted parents, which is where she was able to speak her mind and process the thoughts in her mind, and ultimately come to the realization that she does not want the twins, and wants an . Her boyfriend had given her the ultimatum that if she aborts the kids, then their relationship will be over. She feels overwhelmed with the stressors and mentions that she has stopped eating, had poor sleep, had depressive moods, poor energy, and increased suicidal ideation with intent to overdose. She continues to have some passive suicidal ideation, along with passive intent, but no plans currently. She mentions that she feels that there are \"demon voices\" in there mind, and feels that the voices are negative and condescending. She feels the voices get worse when she gets depressed. She denies any HI/VH. She is open to medication " recommendations. She has been going to therapy groups this morning, and has found it beneficial in offering respite from her overwhelming negative thoughts. Of note, [facility].          Past Psychiatric History:   Past inpatient treatment: None  Past Psychotropic Medications: Took Celexa for 2-3 months in the past, but stopped on her own when she was feeling consistently better.   Violence toward others: Denies  Past suicide attempts: Attempted suicide via Ibuprofen overdose in the past, did not seek hospital help at that time.   History of commitments: None  History of ECT: None         Substance Use and History:     Used cannabis regularly until recently (3 weeks ago), when she learned that she was pregnant, however her Utox on admission was positive for cannabis.         Past Medical History:   PAST MEDICAL HISTORY:   Past Medical History:   Diagnosis Date     NO ACTIVE PROBLEMS        PAST SURGICAL HISTORY: History reviewed. No pertinent surgical history.          Family History:   FAMILY HISTORY:   Family History   Adopted: Yes   Problem Relation Age of Onset     Unknown/Adopted Mother      Unknown/Adopted Father      Unknown/Adopted Maternal Grandmother      Unknown/Adopted Maternal Grandfather      Unknown/Adopted Other            Social History:   SOCIAL HISTORY:   Social History     Tobacco Use     Smoking status: Never Smoker     Smokeless tobacco: Never Used     Tobacco comment: non smoking home   Substance Use Topics     Alcohol use: Not Currently     Comment: 2-3 times per month, 1/2 beer each time            Physical ROS:   The patient endorsed some intermittent abdominal pain. The remainder of 10-point review of systems was negative except as noted in HPI.         PTA Medications:     Medications Prior to Admission   Medication Sig Dispense Refill Last Dose     doxylamine (UNISOM) 25 MG TABS tablet Take 0.5 tablets (12.5 mg) by  mouth 2 times daily 30 tablet 0 Past Week     metoclopramide (REGLAN) 10 MG tablet Take 1 tablet (10 mg) by mouth 3 times daily as needed (Nausea or Vomiting) 30 tablet 0 Past Week     ondansetron (ZOFRAN ODT) 4 MG ODT tab Take 1 tablet (4 mg) by mouth every 6 hours as needed for nausea 20 tablet 0 Past Week     vitamin B6 (PYRIDOXINE) 25 MG tablet Take 1 tablet (25 mg) by mouth 2 times daily 60 tablet 0 Past Week          Allergies:     Allergies   Allergen Reactions     No Known Drug Allergies      Seasonal Allergies           Labs:     Recent Results (from the past 48 hour(s))   Drug abuse screen 6 urine (tox)    Collection Time: 11/10/19  6:18 PM   Result Value Ref Range    Amphetamine Qual Urine Negative NEG^Negative    Barbiturates Qual Urine Negative NEG^Negative    Benzodiazepine Qual Urine Negative NEG^Negative    Cannabinoids Qual Urine Positive (A) NEG^Negative    Cocaine Qual Urine Negative NEG^Negative    Ethanol Qual Urine Negative NEG^Negative    Opiates Qualitative Urine Negative NEG^Negative   CBC with platelets differential    Collection Time: 11/10/19  7:44 PM   Result Value Ref Range    WBC 11.8 (H) 4.0 - 11.0 10e9/L    RBC Count 4.41 3.8 - 5.2 10e12/L    Hemoglobin 13.8 11.7 - 15.7 g/dL    Hematocrit 39.3 35.0 - 47.0 %    MCV 89 78 - 100 fl    MCH 31.3 26.5 - 33.0 pg    MCHC 35.1 31.5 - 36.5 g/dL    RDW 11.5 10.0 - 15.0 %    Platelet Count 292 150 - 450 10e9/L    Diff Method Automated Method     % Neutrophils 66.2 %    % Lymphocytes 26.0 %    % Monocytes 7.4 %    % Eosinophils 0.1 %    % Basophils 0.1 %    % Immature Granulocytes 0.2 %    Nucleated RBCs 0 0 /100    Absolute Neutrophil 7.8 1.6 - 8.3 10e9/L    Absolute Lymphocytes 3.1 0.8 - 5.3 10e9/L    Absolute Monocytes 0.9 0.0 - 1.3 10e9/L    Absolute Eosinophils 0.0 0.0 - 0.7 10e9/L    Absolute Basophils 0.0 0.0 - 0.2 10e9/L    Abs Immature Granulocytes 0.0 0 - 0.4 10e9/L    Absolute Nucleated RBC 0.0    Basic metabolic panel    Collection  Time: 11/10/19  7:44 PM   Result Value Ref Range    Sodium 134 133 - 144 mmol/L    Potassium 3.2 (L) 3.4 - 5.3 mmol/L    Chloride 99 94 - 109 mmol/L    Carbon Dioxide 25 20 - 32 mmol/L    Anion Gap 10 3 - 14 mmol/L    Glucose 109 (H) 70 - 99 mg/dL    Urea Nitrogen 4 (L) 7 - 30 mg/dL    Creatinine 0.43 (L) 0.52 - 1.04 mg/dL    GFR Estimate >90 >60 mL/min/[1.73_m2]    GFR Estimate If Black >90 >60 mL/min/[1.73_m2]    Calcium 8.8 8.5 - 10.1 mg/dL   Acetaminophen level    Collection Time: 11/10/19  7:44 PM   Result Value Ref Range    Acetaminophen Level <2 mg/L   Salicylate level    Collection Time: 11/10/19  7:44 PM   Result Value Ref Range    Salicylate Level <2 mg/dL   UA with Microscopic    Collection Time: 11/10/19 10:06 PM   Result Value Ref Range    Color Urine Yellow     Appearance Urine Slightly Cloudy     Glucose Urine Negative NEG^Negative mg/dL    Bilirubin Urine Negative NEG^Negative    Ketones Urine >150 (A) NEG^Negative mg/dL    Specific Gravity Urine >1.035 (H) 1.003 - 1.035    Blood Urine Negative NEG^Negative    pH Urine 6.0 5.0 - 7.0 pH    Protein Albumin Urine 30 (A) NEG^Negative mg/dL    Urobilinogen mg/dL Normal 0.0 - 2.0 mg/dL    Nitrite Urine Negative NEG^Negative    Leukocyte Esterase Urine Small (A) NEG^Negative    Source Midstream Urine     WBC Urine 9 (H) 0 - 5 /HPF    RBC Urine 4 (H) 0 - 2 /HPF    Bacteria Urine Few (A) NEG^Negative /HPF    Squamous Epithelial /HPF Urine 12 (H) 0 - 1 /HPF    Mucous Urine Present (A) NEG^Negative /LPF   HCG qualitative urine    Collection Time: 11/10/19 10:06 PM   Result Value Ref Range    HCG Qual Urine Positive (A) NEG^Negative   Lipid panel    Collection Time: 11/11/19  7:21 AM   Result Value Ref Range    Cholesterol 106 <200 mg/dL    Triglycerides 84 <150 mg/dL    HDL Cholesterol 37 (L) >49 mg/dL    LDL Cholesterol Calculated 52 <100 mg/dL    Non HDL Cholesterol 69 <130 mg/dL   TSH with free T4 reflex and/or T3 as indicated    Collection Time: 11/11/19   "7:21 AM   Result Value Ref Range    TSH 0.04 (L) 0.40 - 4.00 mU/L   T4 free    Collection Time: 11/11/19  7:21 AM   Result Value Ref Range    T4 Free 1.94 (H) 0.76 - 1.46 ng/dL          Physical and Psychiatric Examination:     /85   Pulse 79   Temp 98.4  F (36.9  C) (Oral)   Resp 18   Ht 1.6 m (5' 3\")   Wt 40.4 kg (89 lb)   LMP  (LMP Unknown)   SpO2 98%   BMI 15.77 kg/m    Weight is 89 lbs 0 oz  Body mass index is 15.77 kg/m .    Physical Exam:  I have reviewed the physical exam as documented by ED provider and agree with findings and assessment and have no additional findings to add at this time.    Mental Status Exam:  Appearance: [place] female. Mild/moderate unkept appearance. No acute distress.   Attitude:  Quiet demeanor, cooperative   Eye Contact:  fair  Mood:  depressed  Affect:  Subdued   Speech:  clear, coherent  Language: fluent and intact in English  Psychomotor, Gait, Musculoskeletal:  no evidence of tardive dyskinesia, dystonia, or tics  Throught Process:  logical, linear and goal oriented  Associations:  no loose associations  Thought Content:  Passive SI with passive intent, however no current plan. Some auditory preceptions of \"demons\" talking to her, in a persecutory/condesencing manner. Denies commanding AH. Denies VH/HI.   Insight:  fair  Judgement:  fair  Oriented to:  time, person, and place  Attention Span and Concentration:  fair  Recent and Remote Memory:  fair  Fund of Knowledge:  appropriate         Admission Diagnoses:   Major Depressive Disorder, recurrent, severe, with peripartum worsening, with mood congruent psychotic symptoms   Generalized Anxiety Disorder   R/o Cannabis Use Disorder          Assessment & Plan:     Assessment:  Patient seem to have had episodes of pre-morbid depressive moods which have precipitously excerbated in the context of learning she's pregnant with twins. A precipitating psychosocial factor seems to be her boyfriend who is " "described as \"toxic\" and non supportive. Her decision to end the pregnancy appears to have been made in the context of depression, however, she was able to deliberate and process her decision to abort when she was away from her boyfriend over the span of multiple days while at home. Currently, she seems to have a consistent/coherent decision to not want the pregnancy and go forth with the . Regarding her depressive moods (with mood congruent psychotic symptoms), we discussed starting Prozac with addition of Seroquel to help attenuate her heightened perceptual auditory symptoms. If the patient's suicidal intent is attenuate by Wednesday, she can be discharged and [facility], however if she is continues to harbor deep depressive/suicidal thoughts and intent, we might have to keep her hospitalized until she is stable.     Plan:  1.) Medication Plan:  - Prozac 20 mg Daily   - Seroquel 12.5 mg Daily and 50 mg HS    2.) Psychosocial Plan:  - Patient will need outpatient psychiatrist and therapist.   - Encourage group participating, since it seems to benefit her.     Legal:  Voluntary     Disposition:  Pending stability, however possibly discharge home in next few days        Thierno Ayala MD  [facility]    "

## 2019-11-11 NOTE — PLAN OF CARE
"Problem: Feelings of Worthlessness, Hopelessness, Excessive Guilt (Depressive Signs/Symptoms)  Goal: Enhanced Self-Esteem and Confidence (Depressive Signs/Symptoms)  Outcome: No Change  Intervention: Promote Confidence and Self-Esteem  Flowsheets (Taken 11/11/2019 1233)  Supportive Measures: active listening utilized; positive reinforcement provided; self-care encouraged; relaxation techniques promoted; verbalization of feelings encouraged    Diagnoses:   Major Depressive Disorder, recurrent, severe, with peripartum worsening, with mood congruent psychotic symptoms   Generalized Anxiety Disorder   R/o Cannabis Use Disorder     Data:   Appearance: Distressed, Casually dressed and Well groomed   Psychomotor Activity: no problem noted  Affect: Tearful, Labile and Anxious/Nervous  Mood: \"sad\"  Speech: Normal rate, rhythm, tone  Thought Process: perseverative  Cognition: Oriented  Psychotic Symptoms: Hallucinations (auditory)  Suicidal Ideation: denies active suicidal ideation but endorses wish to be dead  Self-injurious Thoughts/Behavior: denies current or recent self injurious behavior or ideation.  Homicidal Ideation: denies current or recent homicidal ideation or behaviors.  Aggression: No aggression  Anxiety: 9/10 with 10 being the worst, Signs/symptoms: Excessive worry and Difficulty concentrating  Depression: 9/10 with 10 being the worst, Signs/symptoms: sadness and wish to be dead  Appetite: Decreased  Sleep: No symptoms  Insight: limited  Judgment: poor    Vitals:  Orthostatic Vitals       None              Temperature: 98.4  Respirations:18     PRN Medications:  Patient did require PRN medication for pain and nausea. PRN medication 650 mg acetaminophen and Reglan 10 mg were administered at 0810.This was reported to be effective in reducing symptoms.    Shift Summary:   Patient has spent much of the shift in her room. She has been visibly anxious and tearful. Has taken suggestions to try lavender patches and " "weighted shoulder wrap. Attended groups. Took 2 showers as \"the hot water relaxes me.\" These interventions were reported to be effective in reducing anxiety. Patient denies active suicidal ideation but does endorse wish to be dead. Has no plan or intent. Endorses anxiety and depression both at 9/10. Mentions that she hears voices that say negative things.     Safety Precautions:   Suicide    Action:   Status 15, medication administration, mental health assessment, encourage participation in unit programming    Response:  Patient was medication compliant with no side effects reported or observed..     "

## 2019-11-11 NOTE — PLAN OF CARE
"  Problem: Depressive Symptoms  Goal: Depressive Symptoms  Description  Signs and symptoms of listed problems will be absent or manageable.  Outcome: No Change, patient rated depression at 9/10, reported feeling hopeless and helpless.      Problem: Suicidal Behavior  Goal: Suicidal Behavior is Absent or Managed  Outcome: No Change, patient reported wishes to be dead and having passive suicidal thoughts.     Patient reported wishing to be dead now, stated \"It would be nice if I just don;t wake up in the morning\". She reported having passive thoughts of suicide to overdose if she has a chance. She contracted for safety on the unit. Patient stated: \"I feel like I want to hit my head on the wall\". She rated depression at 9/10, anxiety at 5/10. Patient reported having \"thoughts of demons\". \"Fear of presence of demons\", thinking that demons are feeding off her negative thoughts\", stated: 'I feel I have demons in me\". Denied hallucinations or HI.     Stress: \"It all started 3 weeks ago, when I found out I'm having twins\", \"I want these babies out on my body\", \"I can't eat or drink, \"I can't sleep\". \"I lost my job\", \"I don't see a future with my BF\", \"He is blaming me for getting pregnant\". \"I'm nauseous all the time\".     Support: parents, she moved with them 3 weeks ago, they told her they will support her with whatever she decides to do\".      CD: uses marijuana daily till 3 weeks ago, when she found out she is pregnant. Used in the last 4-5 yrs.     Thoughts were delusional about demons, with poor concentration. Mood was labile, patient cried few times during the interview, very depressed, and hopeless. Speech was clear and organized. Pt appeared dehydrated, but clean and well groomed.     Pain: headache and abdominal pain: patient was given Tylenol and Reglan by assigned RN.     Patient has a scheduled  for this Wednesday at [facility] in [place].      "

## 2019-11-11 NOTE — PHARMACY-ADMISSION MEDICATION HISTORY
Admission Medication History Completed by Pharmacy    Sources used to verify prior to admission medications:   - Patient interview with parents' present  - [facility] directions and [facility] records [id number] [facility] [place] [facility]    Medication Adherence:   Good    Pertinent Changes Made to PTA Medication List:  Added: none  Removed:   1) cetirizine 10mg PO PRN allergies   2) Apri 1 tablet by mouth daily   Changed: none    Prior to Admission Medication List:  Prior to Admission Medications   Prescriptions Last Dose Informant     doxylamine (UNISOM) 25 MG TABS tablet Past Week Pharmacy     Sig: Take 0.5 tablets (12.5 mg) by mouth 2 times daily   metoclopramide (REGLAN) 10 MG tablet Past Week Pharmacy     Sig: Take 1 tablet (10 mg) by mouth 3 times daily as needed (Nausea or Vomiting)   ondansetron (ZOFRAN ODT) 4 MG ODT tab Past Week Pharmacy     Sig: Take 1 tablet (4 mg) by mouth every 6 hours as needed for nausea   vitamin B6 (PYRIDOXINE) 25 MG tablet Past Week Pharmacy     Sig: Take 1 tablet (25 mg) by mouth 2 times daily           Time spent: 20 minutes  -----------  Erendira Wills, Pharm.D., BCPP  Behavioral Health Pharmacist  [facility] ([facility])  Ascom: *[phone] or [phone] (1pm to 9pm daily)

## 2019-11-12 VITALS
HEIGHT: 63 IN | OXYGEN SATURATION: 99 % | RESPIRATION RATE: 17 BRPM | WEIGHT: 89 LBS | DIASTOLIC BLOOD PRESSURE: 73 MMHG | BODY MASS INDEX: 15.77 KG/M2 | HEART RATE: 72 BPM | SYSTOLIC BLOOD PRESSURE: 110 MMHG | TEMPERATURE: 98.4 F

## 2019-11-12 LAB
ANION GAP SERPL CALCULATED.3IONS-SCNC: 11 MMOL/L (ref 3–14)
BUN SERPL-MCNC: 5 MG/DL (ref 7–30)
CALCIUM SERPL-MCNC: 9.5 MG/DL (ref 8.5–10.1)
CHLORIDE SERPL-SCNC: 100 MMOL/L (ref 94–109)
CO2 SERPL-SCNC: 24 MMOL/L (ref 20–32)
CREAT SERPL-MCNC: 0.44 MG/DL (ref 0.52–1.04)
GFR SERPL CREATININE-BSD FRML MDRD: >90 ML/MIN/{1.73_M2}
GLUCOSE SERPL-MCNC: 97 MG/DL (ref 70–99)
POTASSIUM SERPL-SCNC: 3.1 MMOL/L (ref 3.4–5.3)
SODIUM SERPL-SCNC: 135 MMOL/L (ref 133–144)
T3 SERPL-MCNC: 162 NG/DL (ref 60–181)
T4 FREE SERPL-MCNC: 2.15 NG/DL (ref 0.76–1.46)
TSH SERPL DL<=0.005 MIU/L-ACNC: 0.03 MU/L (ref 0.4–4)

## 2019-11-12 PROCEDURE — 25000132 ZZH RX MED GY IP 250 OP 250 PS 637: Performed by: PHYSICIAN ASSISTANT

## 2019-11-12 PROCEDURE — 25000132 ZZH RX MED GY IP 250 OP 250 PS 637: Performed by: NURSE PRACTITIONER

## 2019-11-12 PROCEDURE — 36415 COLL VENOUS BLD VENIPUNCTURE: CPT | Performed by: PHYSICIAN ASSISTANT

## 2019-11-12 PROCEDURE — 99222 1ST HOSP IP/OBS MODERATE 55: CPT | Performed by: PHYSICIAN ASSISTANT

## 2019-11-12 PROCEDURE — 25800030 ZZH RX IP 258 OP 636: Performed by: PHYSICIAN ASSISTANT

## 2019-11-12 PROCEDURE — 80048 BASIC METABOLIC PNL TOTAL CA: CPT | Performed by: PHYSICIAN ASSISTANT

## 2019-11-12 PROCEDURE — 84439 ASSAY OF FREE THYROXINE: CPT | Performed by: PHYSICIAN ASSISTANT

## 2019-11-12 PROCEDURE — 25000125 ZZHC RX 250: Performed by: PHYSICIAN ASSISTANT

## 2019-11-12 PROCEDURE — 84480 ASSAY TRIIODOTHYRONINE (T3): CPT | Performed by: PHYSICIAN ASSISTANT

## 2019-11-12 PROCEDURE — 84443 ASSAY THYROID STIM HORMONE: CPT | Performed by: PHYSICIAN ASSISTANT

## 2019-11-12 PROCEDURE — 99207 ZZC CONSULT E&M CHANGED TO INITIAL LEVEL: CPT | Performed by: PHYSICIAN ASSISTANT

## 2019-11-12 PROCEDURE — 25000132 ZZH RX MED GY IP 250 OP 250 PS 637: Performed by: PSYCHIATRY & NEUROLOGY

## 2019-11-12 RX ORDER — METOCLOPRAMIDE 10 MG/1
10 TABLET ORAL 3 TIMES DAILY PRN
Qty: 60 TABLET | Refills: 1 | Status: SHIPPED | OUTPATIENT
Start: 2019-11-12 | End: 2019-12-14

## 2019-11-12 RX ORDER — ONDANSETRON 4 MG/1
4 TABLET, ORALLY DISINTEGRATING ORAL EVERY 6 HOURS PRN
Status: DISCONTINUED | OUTPATIENT
Start: 2019-11-12 | End: 2019-11-12 | Stop reason: HOSPADM

## 2019-11-12 RX ORDER — ACETAMINOPHEN 325 MG/1
975 TABLET ORAL ONCE
Status: COMPLETED | OUTPATIENT
Start: 2019-11-12 | End: 2019-11-12

## 2019-11-12 RX ORDER — QUETIAPINE FUMARATE 25 MG/1
12.5 TABLET, FILM COATED ORAL DAILY PRN
Qty: 15 TABLET | Refills: 1 | Status: SHIPPED | OUTPATIENT
Start: 2019-11-12 | End: 2019-12-14

## 2019-11-12 RX ORDER — POTASSIUM CHLORIDE 1500 MG/1
40 TABLET, EXTENDED RELEASE ORAL ONCE
Status: COMPLETED | OUTPATIENT
Start: 2019-11-12 | End: 2019-11-12

## 2019-11-12 RX ORDER — ACETAMINOPHEN 500 MG
1000 TABLET ORAL 3 TIMES DAILY PRN
Qty: 120 TABLET | Refills: 1 | Status: SHIPPED | OUTPATIENT
Start: 2019-11-12 | End: 2019-12-14

## 2019-11-12 RX ORDER — LIDOCAINE 40 MG/G
CREAM TOPICAL
Status: DISCONTINUED | OUTPATIENT
Start: 2019-11-12 | End: 2019-11-12 | Stop reason: HOSPADM

## 2019-11-12 RX ORDER — PROCHLORPERAZINE MALEATE 5 MG
5 TABLET ORAL EVERY 6 HOURS PRN
Status: DISCONTINUED | OUTPATIENT
Start: 2019-11-12 | End: 2019-11-12 | Stop reason: HOSPADM

## 2019-11-12 RX ORDER — QUETIAPINE FUMARATE 50 MG/1
50 TABLET, FILM COATED ORAL AT BEDTIME
Qty: 30 TABLET | Refills: 1 | Status: SHIPPED | OUTPATIENT
Start: 2019-11-12 | End: 2019-12-14

## 2019-11-12 RX ORDER — DEXTROSE MONOHYDRATE, SODIUM CHLORIDE, AND POTASSIUM CHLORIDE 50; 1.49; 4.5 G/1000ML; G/1000ML; G/1000ML
1000 INJECTION, SOLUTION INTRAVENOUS ONCE
Status: COMPLETED | OUTPATIENT
Start: 2019-11-12 | End: 2019-11-12

## 2019-11-12 RX ORDER — ACETAMINOPHEN 325 MG/1
975 TABLET ORAL 3 TIMES DAILY PRN
Status: DISCONTINUED | OUTPATIENT
Start: 2019-11-12 | End: 2019-11-12 | Stop reason: HOSPADM

## 2019-11-12 RX ADMIN — Medication 12.5 MG: at 08:41

## 2019-11-12 RX ADMIN — METOCLOPRAMIDE 10 MG: 10 TABLET ORAL at 11:02

## 2019-11-12 RX ADMIN — POTASSIUM CHLORIDE 40 MEQ: 1500 TABLET, EXTENDED RELEASE ORAL at 16:19

## 2019-11-12 RX ADMIN — Medication 25 MG: at 08:00

## 2019-11-12 RX ADMIN — FLUOXETINE 20 MG: 20 CAPSULE ORAL at 08:41

## 2019-11-12 RX ADMIN — METOCLOPRAMIDE 10 MG: 10 TABLET ORAL at 07:55

## 2019-11-12 RX ADMIN — Medication 12.5 MG: at 08:00

## 2019-11-12 RX ADMIN — ACETAMINOPHEN 975 MG: 325 TABLET, FILM COATED ORAL at 11:02

## 2019-11-12 RX ADMIN — LIDOCAINE: 40 CREAM TOPICAL at 14:32

## 2019-11-12 RX ADMIN — ACETAMINOPHEN 650 MG: 325 TABLET, FILM COATED ORAL at 04:02

## 2019-11-12 RX ADMIN — PROCHLORPERAZINE MALEATE 5 MG: 5 TABLET ORAL at 14:56

## 2019-11-12 RX ADMIN — POTASSIUM CHLORIDE, DEXTROSE MONOHYDRATE AND SODIUM CHLORIDE 1000 ML: 150; 5; 450 INJECTION, SOLUTION INTRAVENOUS at 17:37

## 2019-11-12 ASSESSMENT — ACTIVITIES OF DAILY LIVING (ADL)
HYGIENE/GROOMING: INDEPENDENT
DRESS: INDEPENDENT
ORAL_HYGIENE: INDEPENDENT

## 2019-11-12 ASSESSMENT — ENCOUNTER SYMPTOMS
NERVOUS/ANXIOUS: 1
WEAKNESS: 0
HEMATURIA: 0
DIARRHEA: 0
NAUSEA: 1
PALPITATIONS: 0
NECK STIFFNESS: 0
COLOR CHANGE: 0
FREQUENCY: 0
COUGH: 0
VOMITING: 0
BLOOD IN STOOL: 0
CONSTIPATION: 0
DYSURIA: 0

## 2019-11-12 NOTE — DISCHARGE INSTRUCTIONS
Behavioral Discharge Planning and Instructions      Summary:  You were admitted on 11/10/2019  due to Depression and Suicidal Ideations.  You were treated by Dr Thierno Ayala MD and discharged on 11/12/19 from [facility] to Home    Principal Diagnosis:   Major Depressive Disorder, recurrent, severe, with peripartum worsening, with mood congruent psychotic symptoms   Generalized Anxiety Disorder  R/o Cannabis Use Disorder     Health Care Follow-up Appointments:   As discussed with your treatment team, you have had an appointment made for medication management. You declined to have a therapy appointment set up for you prior to discharge. You have been provided resources for free walk-in counseling that is staffed by volunteer professionals and you can be anonymous if you want. You are also being provided information for traditional therapy providers as well in case you change your mind and decide to be established with a regular therapist. These traditional therapy providers have multiple locations throughout the [facility].     [facility] - Med[facility] Management  Date/Time: 12/18/19 (Wednesday) at 12:40pm, arrive 15 minutes early and bring your insurance information.  Provider: Verito Flores  Address: [facility] [place] [address]  Phone: [phone]  Fax: [phone]  If you are going to cancel or reschedule your appointment, be sure to do so more then 24 hours in advance. If you miss your first appointment without canceling more then 24 hours in advance, you will be unable to get medication management at [facility] forever.    Free counseling and services [facility]  Walk-In at [facility]  Address: [address] [place] [address]  Phane: [phone]  Hours: Monday and Wednesday: 5:00PM - 7:00PM  Free services and counseling    Walk-In at [facility]  Address: [address] [place] [address]  Phone: [phone]  Hours: Tuesday  and Thursday: 6:00PM - 8:00PM  Free services and counseling    [facility]  Address: [address] [facility], [address]  Phone: [phone] (Call for hours of operation)  Free services and counseling    Traditional Therapy Options  [facility]  Phone: [phone] ([place] Location)  They have multiple locations throughout the [facility] and can be viewed at [email or web address]  Phone: [phone] (General number)  They have multiple locations throughout the [facility] and can be viewed at [email or web address]    Attend all scheduled appointments with your outpatient providers. Call at least 24 hours in advance if you need to reschedule an appointment to ensure continued access to your outpatient providers.   Major Treatments, Procedures and Findings:  You were provided with: a psychiatric assessment, assessed for medical stability, medication evaluation and/or management, group therapy and milieu management    Symptoms to Report: increased confusion, losing more sleep, mood getting worse or thoughts of suicide    Early warning signs can include: increased depression or anxiety sleep disturbances increased thoughts or behaviors of suicide or self-harm     Safety and Wellness:  Take all medicines as directed.  Make no changes unless your doctor suggests them.      Follow treatment recommendations.  Refrain from alcohol and non-prescribed drugs.  If there is a concern for safety, call 911.    Resources:   Crisis Intervention: 983.422.2651 or 792-876-1675 (TTY: 242.401.9322).  Call anytime for help.  National [place]vid on Mental Illness (www.[place].lizzy.org): 605.617.1323 or 797-591-6073.  Suicide Awareness Voices of Education (SAVE) (www.save.org): 750-578-ZSQI (4473)  National Suicide Prevention Line (www.mentalhealthmn.org): 233-633-ETWD (0321)  Mental Health Consumer/Survivor Network of [place] (www.mhcsn.net):  107-675-3037 or 194-527-2263  Mental Health Association of [place] (www.mentalhealth.org): 805.833.9534 or 765-831-9847  [facility] (COPE) Response - Adult [phone]      The treatment team has appreciated the opportunity to work with you.     If you have any questions or concerns our unit number is [phone]

## 2019-11-12 NOTE — PLAN OF CARE
Qian was in her room most of the shift. Out at times briefly to attempt to eat or use the phone. She struggled today with significant symptoms of pregnancy with lots of N/V and abd cramping. Minimal relief with many PRN medications and frequent use of hot packs. Plan is for IVF asap, and discharge later this evening after completion of IVF and f/u tomorrow with medical for further assistance and full relief of symptoms.

## 2019-11-12 NOTE — PROGRESS NOTES
Pt complaining of chest pain towards the end of shift. Was observed bending over, sometimes in a fetal position and grimacing in pain.  Pt denies any nausea or SOB, denies any radiating pain to arms or back. VSS (see flow sheet) In conversation, pt mentions she has not had much to eat in about 3 weeks?. Her lips are pretty chapped and dry. (will push fluids) Writer made 2 slices of toast and offered juices. Pt strongly encourage to eat at least one of the toast. Will pass on to morning RN. Writer back in room to check on pt, she is also complaining of some UTI sx; pain and burning on urination and foul smelling urine. She has tolerated some fluids ( drank 2 cups of apple juice and ate half of a slice of  Toast. Report given to FERNANDO Izaguirre to update internal medicine.

## 2019-11-12 NOTE — PLAN OF CARE
"Patient was observed to be crying \"I am in pain\". Vitals were taken (see Mar). She was given Prn tylenol and reglan with little relief per patient. She later took a shower for about 45 minutes which helped a bit she said. About 1925, patient was observed to be crying in her room and telling writer \"I want the baby out now\". She reported she cannot wait till Wednesday. She had an emesis in her basin and said \"I am trying to force the baby out because I am in so much pain\". Emotional support was offered and the encouragement to wait for medication to kick in. She cooperated and stopped trying to induce an emesis. She reported feeling anxious and depressed rating both at 5/10(10 being the worst) and denied HI and AVH. She reported she feels suicidal \"only when the pain is really bad\". She came out to the [facility] and was observed to be watching TV with peers. Patient's family (parents and sister) came to visit and it went well \"they made me smile\". She refused super \"I cant keep food down\". She remains medication adherent and denies any side effects at this time. Will continue to monitor and report.  "

## 2019-11-12 NOTE — CONSULTS
Consult Date:  11/12/2019      HISTORY OF PRESENT ILLNESS:  The patient is a 24-year-old female admitted to [facility] for suicidal ideation within the context of severe abdominal cramping, nausea, and vomiting while being approximately 8 weeks pregnant, which she wants to terminate.  An Internal Medicine consultation was ordered by Dr. Ayala to assess medical problems including the aforementioned nausea and vomiting in early pregnancy as well as abnormal thyroid function testing.  At this time, Qian admits to persistent nausea, vomiting, abdominal pain, and vaginal spotting since learning she was pregnant approximately 3 weeks ago.  She has had several visits to the Emergency Department for these symptoms, of which evaluation including obstetric ultrasounds have been nonrevealing and possibly due to a tiny subchorionic hemorrhage.  The patient adamantly states she is going to terminate the pregnancy as soon as possible.  The patient states that due to the nausea and vomiting, she has had relatively little p.o. intake of food and fluids.  The patient states she is going to discharge later today and declines wanting to transfer to Medicine to receive just IV fluids for nutritional support.  The on-call Obstetrics resident was called and she made this writer aware, whom subsequently made the patient aware, that her pregnancy could be terminated while still here in the hospital, but patient states she has already established care at [facility] in [place] and has already completed the 24-hour Right to Know Act over a thorough phone conversation this past Sunday and plans to follow up with them tomorrow and terminate the pregnancy.  The patient is agreeable to stay to receive IV fluids with a repeat potassium level later this afternoon prior to anticipated discharge this p.m.  The patient noted to have abnormal thyroid function testing on admission suggestive of hyperthyroidism; however, per discussion with  Obstetrics resident, this is not an uncommon finding in pregnancy.  States it usually resolves after the pregnancy, in this case, after her termination most likely tomorrow.  The patient has no other physical issues at this time.      PAST MEDICAL HISTORY:   1.  Depression and other psychiatric history per Dr. Ayala.   2.  Denies history of major medical problems and surgeries including cardiopulmonary disease, hypertension, and diabetes.      ADMISSION MEDICATIONS:  Reviewed and listed in the medication reconciliation list.      ALLERGIES:  NO KNOWN DRUG ALLERGIES.      SOCIAL HISTORY:  Reviewed.      FAMILY HISTORY:  Reviewed and noncontributory.      REVIEW OF SYSTEMS:  Ten-point review of systems is negative except as stated above in the history of present illness.      PHYSICAL EXAMINATION:   GENERAL:  Nontoxic appearing young woman in no acute distress.   VITAL SIGNS:  Stable.  Temperature afebrile, pulse in the 80s, blood pressure 123/86, oxygen saturation 99% on room air.   HEENT:  Negative.   NECK:  Supple.  No cervical lymphadenopathy or thyromegaly.   LUNGS:  Clear.   CARDIOVASCULAR:  Regular rate and rhythm.   ABDOMEN:  Soft, mild diffuse tenderness without guarding or masses.   EXTREMITIES:  No edema.   SKIN:  No rash on exposed areas.   NEUROLOGIC:  She is awake, alert, and oriented x3.  Cranial nerves grossly intact.  Motor strength is symmetric.  She is not tremulous.  Gait unremarkable.      LABORATORY DATA:  Urine pregnancy test positive.  Serum hCG quantitative 160,976.  Repeat potassium level 3.1.  Repeat thyroid function studies reveal a TSH of 0.03 and a free thyroxine of 2.15.  Otherwise, recurrent CBC and the rest of the basic metabolic panel unremarkable.  Lipid panel unremarkable.  Urinalysis with white blood cells and 4 red blood cells, but with 12 squamous cells.  Urine drug screen positive for cannabinoids.  Tylenol and salicylate levels negative.      ASSESSMENT:   1.  Depression per  Dr. Ayala.  The patient states she is no longer suicidal and per Psychiatry is going to discharge later today.   2.  Persistent nausea and vomiting in early pregnancy, stable yet not optimally controlled on prior to admission antiemetics.   3.  An 8-week live pregnancy based on most recent abdominal ultrasound in the emergency department prior to transfer on 11/09.  The patient plans to terminate the pregnancy after having her 24-hour Right To Know Act instituted this past Sunday at [facility] in [place].   4.  Mild hyperthyroidism based on repeat thyroid function testing today, likely due to her current pregnancy and anticipate thyroid function testing to resolve after she terminates her pregnancy, most likely tomorrow.   5.  Recurrent hypokalemia, stable and most likely due to above recurrent vomiting from pregnancy.   6.  Otherwise, overall apparent normally good physical health.      PLAN:  Regarding her nausea and vomiting, continue with prior to admission vitamin B6 and Unisom.  However, discontinue Reglan in lieu of starting p.r.n. prochlorperazine and Zofran.  The patient is agreeable prior to discharge to receive a 1 liter IV bolus of half normal saline with 5% dextrose and potassium chloride with repeat potassium level later today at approximately 4:00 p.m. prior to planned discharge in the early evening.  As above, the patient plans to follow up tomorrow at [facility] in [place] for termination of her pregnancy.  She was instructed at that time to have repeat thyroid function testing or within 1 week after termination with her family physician to ensure thyroid function testing improving or has normalized.  No further medical intervention indicated at this time.  The patient is medically stable and Medicine will follow up on results of repeat potassium level later today and sign off if normal.  Please feel free to call with questions.      Thank you for this consultation.          NAN VASQUEZ PA-C             D: 2019   T: 2019   MT: DALY      Name:     MOODY BERNABE   MRN:      [id number]        Account:       [id number]   :      1995           Consult Date:  2019      Document: [id number]

## 2019-11-12 NOTE — PROGRESS NOTES
Attempted to place IV x 2, unable to place. Pt stated she prefers to go home without IVF, call currently placed to medical to see if they would allow that.

## 2019-11-12 NOTE — PROGRESS NOTES
EJ (writer) met briefly with pt to review her discharge plan. EJ emphasized the importance of pt keeping her [facility] management appointment and their cancellation policy as it can impact her access to services there in the future. Pt verbalized understanding and declined any additional aftercare appointments being made prior to her discharge.

## 2019-11-13 ENCOUNTER — PATIENT OUTREACH (OUTPATIENT)
Dept: CARE COORDINATION | Facility: CLINIC | Age: 24
End: 2019-11-13

## 2019-11-13 DIAGNOSIS — Z71.89 OTHER SPECIFIED COUNSELING: ICD-10-CM

## 2019-11-13 NOTE — PROGRESS NOTES
Pt was resting in bed at start of shift with her father at  Bedside. She refused lab draw x2 this shift. Stated all she needed is the IVF and then will go home. IV RN arrived at the unit and inserted IV and administered IV per doctor's order. When IV was competed, writer discontinued the IV. Applied pressure and dressing to the IV site. No swelling noted. Pt refused to take her bed time meds. She stated that she will take them at home. Writer had given discharge instructions, pt signed the AVS. Gave all her Home Meds. Psych associate checked all belongings with pt. No items were missing. Pt denied SI/SIB/hallucinations. No guns available at home. Pt feels safe to leave the unit and feels safe at home. Pt and her father left the unit at 2012. Pt was discharged at this time.

## 2019-11-13 NOTE — PROGRESS NOTES
Clinic Care Coordination Contact  [facility]/Voicemail    Referral Source: ED Follow-Up  Clinical Data: Care Coordinator Outreach  Outreach attempted x 1.  Left message on patient's voicemail with call back information and requested return call.  Plan: Care Coordinator will try to reach patient again in 1-2 business days.    Navin Munson JAYSHREE  Clinic Care Coordinator  [facility]  [phone]  [email or web address]

## 2019-11-18 ENCOUNTER — PATIENT OUTREACH (OUTPATIENT)
Dept: CARE COORDINATION | Facility: CLINIC | Age: 24
End: 2019-11-18

## 2019-11-18 NOTE — PROGRESS NOTES
Clinic Care Coordination Contact    Clinic Care Coordination Contact  OUTREACH    Referral Information:  Referral Source: ED Follow-Up    Primary Diagnosis: Behavioral Health    Chief Complaint   Patient presents with     Clinic Care Coordination - Post Hospital     Post hospital- SI/MH        Universal Utilization:   Clinic Utilization  Difficulty keeping appointments:: No  Compliance Concerns: No  No-Show Concerns: No  No PCP office visit in Past Year: Yes  Utilization    Last refreshed: 11/16/2019  9:28 PM:  Hospital Admissions 0           Last refreshed: 11/16/2019  9:28 PM:  ED Visits 4           Last refreshed: 11/16/2019  9:28 PM:  No Show Count (past year) 0              Current as of: 11/16/2019  9:28 PM              Clinical Concerns:  Pt was admitted due to increasing depression and SI with plan to overdose. Pt identify's discovering she became pregnant 3 weeks ago, and her symptoms have been escalating since then.    SWCC spoke with pt on this date.  Pt states she is feeling fine and does not feel she needs to establish with a counselor/therapist or psychiatrist at this time.  Pt denied CC providing any counseling or supportive options.  Pt states she lost the baby and is feeling sad about this. There again, denied a need for any support groups or counseling as it relates to pregnancy loss.    Pt denied any ongoing needs at this time. Declined care coordination.     Patient/Caregiver understanding: Pt reports understanding and denies any additional questions or concerns at this times. SW CC engaged in AIDET communication during encounter.     Future Appointments              In 1 week Lora Garcia APRN CNP; TATE EXAM ROOM 04 [facility] TATE [place]          Plan: No further outreaches will be made at this time unless a new referral is made or a change in the pt's status occurs. Patient was provided with this writer's contact information and encouraged to call with any questions or  concerns.    Navin Munson, [facility]  Clinic Care Coordinator  [facility]  [phone]  [email or web address]

## 2019-11-25 NOTE — DISCHARGE SUMMARY
"[facility], [place]   Psychiatric Discharge Summary      Qian Loo MRN# [id number]   Age: 24 year old YOB: 1995     Date of Admission:  11/10/2019  Date of Discharge:  19  Admitting Physician:  Thierno Ayala MD  Discharge Physician:  Thierno Ayala MD         Summary/Hospital Course/Disposition:   Reason for Hospitalization: Qian is a  24 year old female with history of depression and anxiety who is admitted on a voluntary basis for worsening depressive moods, suicidal ideation with intent, in the context of finding out she was 9 weeks pregnant with twins.       Hospital Course:   (Initial Assessment): Patient seem to have had episodes of pre-morbid depressive moods which have precipitously excerbated in the context of learning she's pregnant with twins. A precipitating psychosocial factor seems to be her boyfriend who is described as \"toxic\" and non supportive. Her decision to end the pregnancy appears to have been made in the context of depression, however, she was able to deliberate and process her decision to abort when she was away from her boyfriend over the span of multiple days while at home. Currently, she seems to have a consistent/coherent decision to not want the pregnancy and go forth with the . Regarding her depressive moods (with mood congruent psychotic symptoms), we discussed starting Prozac with addition of Seroquel to help attenuate her heightened perceptual auditory symptoms. If the patient's suicidal intent is attenuate by Wednesday, she can be discharged and [facility], however if she is continues to harbor deep depressive/suicidal thoughts and intent, we might have to keep her hospitalized until she is stable.     Update -: Patient was compliant with her medications. She denied any SI, intent or plan. She was future oriented, and focused [facility] " . As mentioned earlier, I determined that she has capacity to consent for  and is not making decisions in context to a depressed mind set. Plan to discharge to parent's care. Patient agreeable to follow up with psychiatry providers.          DIagnoses:   Major Depressive Disorder, recurrent, severe, with peripartum worsening, with mood congruent psychotic symptoms   Generalized Anxiety Disorder   R/o Cannabis Use Disorder          Labs:   No results found for this or any previous visit (from the past 24 hour(s)).         Consults:   ASSESSMENT:   1.  Depression per Dr. Ayala.  The patient states she is no longer suicidal and per Psychiatry is going to discharge later today.   2.  Persistent nausea and vomiting in early pregnancy, stable yet not optimally controlled on prior to admission antiemetics.   3.  An 8-week live pregnancy based on most recent abdominal ultrasound in the emergency department prior to transfer on .  The patient plans to terminate the pregnancy after having her 24-hour Right To Know Act instituted this past  at [facility] in [place].   4.  Mild hyperthyroidism based on repeat thyroid function testing today, likely due to her current pregnancy and anticipate thyroid function testing to resolve after she terminates her pregnancy, most likely tomorrow.   5.  Recurrent hypokalemia, stable and most likely due to above recurrent vomiting from pregnancy.   6.  Otherwise, overall apparent normally good physical health.      PLAN:  Regarding her nausea and vomiting, continue with prior to admission vitamin B6 and Unisom.  However, discontinue Reglan in lieu of starting p.r.n. prochlorperazine and Zofran.  The patient is agreeable prior to discharge to receive a 1 liter IV bolus of half normal saline with 5% dextrose and potassium chloride with repeat potassium level later today at approximately 4:00 p.m. prior to planned discharge in the early evening.  As above, the  patient plans to [facility] in [place] [facility] of her pregnancy.  She was instructed at that time to have repeat thyroid function testing or within 1 week after termination with her family physician to ensure thyroid function testing improving or has normalized.  No further medical intervention indicated at this time.  The patient is medically stable and Medicine will follow up on results of repeat potassium level later today and sign off if normal.  Please feel free to call with questions.      Thank you for this consultation.         NAN VASQUEZ PA-C                  Discharge Medications:        Review of your medicines      START taking      Dose / Directions   acetaminophen 500 MG tablet  Commonly known as:  TYLENOL  Used for:  Morning sickness      Dose:  1,000 mg  Take 2 tablets (1,000 mg) by mouth 3 times daily as needed for mild pain  Quantity:  120 tablet  Refills:  1     FLUoxetine 20 MG capsule  Commonly known as:  PROzac  Used for:  Major depressive disorder, recurrent episode, in partial remission (H)      Dose:  20 mg  Take 1 capsule (20 mg) by mouth daily  Quantity:  30 capsule  Refills:  1     * QUEtiapine 25 MG tablet  Commonly known as:  SEROquel  Used for:  Major depressive disorder, recurrent episode, in partial remission (H)      Dose:  12.5 mg  Take 0.5 tablets (12.5 mg) by mouth daily as needed (for highly anxious, elevated moods)  Quantity:  15 tablet  Refills:  1     * QUEtiapine 50 MG tablet  Commonly known as:  SEROquel  Used for:  Major depressive disorder, recurrent episode, in partial remission (H)      Dose:  50 mg  Take 1 tablet (50 mg) by mouth At Bedtime  Quantity:  30 tablet  Refills:  1         * This list has 2 medication(s) that are the same as other medications prescribed for you. Read the directions carefully, and ask your doctor or other care provider to review them with you.            CONTINUE these medicines which have NOT  CHANGED      Dose / Directions   doxylamine 25 MG Tabs tablet  Commonly known as:  UNISOM      Dose:  12.5 mg  Take 0.5 tablets (12.5 mg) by mouth 2 times daily  Quantity:  30 tablet  Refills:  0     metoclopramide 10 MG tablet  Commonly known as:  REGLAN  Used for:  Morning sickness      Dose:  10 mg  Take 1 tablet (10 mg) by mouth 3 times daily as needed (Nausea or Vomiting)  Quantity:  60 tablet  Refills:  1     vitamin B6 25 MG tablet  Commonly known as:  pyridOXINE      Dose:  25 mg  Take 1 tablet (25 mg) by mouth 2 times daily  Quantity:  60 tablet  Refills:  0        STOP taking    ondansetron 4 MG ODT tab  Commonly known as:  ZOFRAN ODT              Where to get your medicines      These medications were sent to [facility] [address]  [address] [facility] [address] [address]    Phone:  [phone]     acetaminophen 500 MG tablet    FLUoxetine 20 MG capsule    metoclopramide 10 MG tablet    QUEtiapine 25 MG tablet    QUEtiapine 50 MG tablet              Mental Status Examination:   Appearance: [place] American female. Mild/moderate unkept appearance. No acute distress.   Attitude:  Quiet demeanor, cooperative   Eye Contact:  fair  Mood:  depressive   Affect:  Subdued   Speech:  clear, coherent  Language: fluent and intact in English  Psychomotor, Gait, Musculoskeletal:  no evidence of tardive dyskinesia, dystonia, or tics  Throught Process:  logical, linear and goal oriented  Associations:  no loose associations  Thought Content:  Denies SI,  intent, or current plan. Denied AH of demons. [facility] .   Insight:  fair  Judgement:  fair  Oriented to:  time, person, and place  Attention Span and Concentration:  fair  Recent and Remote Memory:  fair  Fund of Knowledge:  appropriate         Discharge Plan:   No discharge procedures on file.    Health Care Follow-up Appointments:   As discussed with your treatment  team, you have had an appointment made for medication management. You declined to have a therapy appointment set up for you prior to discharge. You have been provided resources for free walk-in counseling that is staffed by volunteer professionals and you can be anonymous if you want. You are also being provided information for traditional therapy providers as well in case you change your mind and decide to be established with a regular therapist. These traditional therapy providers have multiple locations throughout the [facility].      [facility] - Medication Management  Date/Time: 12/18/19 (Wednesday) at 12:40pm, arrive 15 minutes early and bring your insurance information.  Provider: Verito Flores  Address: [address] [place] [address]  Phone: [phone]  Fax: [phone]  If you are going to cancel or reschedule your appointment, be sure to do so more then 24 hours in advance. If you miss your first appointment without canceling more then 24 hours in advance, you will be unable to get medication management at [facility] forever.     Free counseling and services [facility]  Walk-In at [facility]  Address: [address] [place] [address]  [place]: [phone]  Hours: Monday and Wednesday: 5:00PM - 7:00PM  Free services and counseling     Walk-In at [facility]  Address: [address] [place] [address]  Phone: [phone]  Hours: Tuesday and Thursday: 6:00PM - 8:00PM  Free services and counseling     Walk in [facility]  Address: [address] [facility], [address]  Phone: [phone] (Call for hours of operation)  Free services and counseling     Traditional Therapy Options  [facility]  Phone: [phone] ([place] Location)  They have multiple locations throughout the [facility] and can be viewed at [email or web address]     [facility]  Phone: [phone]  (General number)  They have multiple locations throughout the [facility] and can be viewed at [email or web address]    Thierno Ayala MD   [facility]

## 2019-12-14 ENCOUNTER — HOSPITAL ENCOUNTER (INPATIENT)
Facility: CLINIC | Age: 24
LOS: 1 days | Discharge: HOME | DRG: 918 | End: 2019-12-15
Attending: EMERGENCY MEDICINE | Admitting: INTERNAL MEDICINE
Payer: COMMERCIAL

## 2019-12-14 ENCOUNTER — HOSPITAL ENCOUNTER (INPATIENT)
Age: 24
End: 2019-12-14
Payer: COMMERCIAL

## 2019-12-14 DIAGNOSIS — I95.2 HYPOTENSION DUE TO DRUGS: ICD-10-CM

## 2019-12-14 DIAGNOSIS — F32.2 SEVERE DEPRESSION (H): ICD-10-CM

## 2019-12-14 DIAGNOSIS — T50.912A SUICIDE ATTEMPT BY MULTIPLE DRUG OVERDOSE, INITIAL ENCOUNTER (H): ICD-10-CM

## 2019-12-14 PROBLEM — T50.902A SUICIDE ATTEMPT BY DRUG INGESTION (H): Status: ACTIVE | Noted: 2019-12-14

## 2019-12-14 LAB
ALBUMIN SERPL-MCNC: 3.7 G/DL (ref 3.4–5)
ALBUMIN UR-MCNC: NEGATIVE MG/DL
ALP SERPL-CCNC: 71 U/L (ref 40–150)
ALT SERPL W P-5'-P-CCNC: 15 U/L (ref 0–50)
AMPHETAMINES UR QL SCN: NEGATIVE
ANION GAP SERPL CALCULATED.3IONS-SCNC: 7 MMOL/L (ref 3–14)
APAP SERPL-MCNC: <2 MG/L (ref 10–20)
APAP SERPL-MCNC: <2 MG/L (ref 10–20)
APPEARANCE UR: CLEAR
AST SERPL W P-5'-P-CCNC: 17 U/L (ref 0–45)
B-HCG SERPL-ACNC: 22 IU/L (ref 0–5)
BACTERIA #/AREA URNS HPF: ABNORMAL /HPF
BARBITURATES UR QL: NEGATIVE
BASOPHILS # BLD AUTO: 0.1 10E9/L (ref 0–0.2)
BASOPHILS NFR BLD AUTO: 0.5 %
BENZODIAZ UR QL: NEGATIVE
BILIRUB SERPL-MCNC: 0.2 MG/DL (ref 0.2–1.3)
BILIRUB UR QL STRIP: NEGATIVE
BUN SERPL-MCNC: 6 MG/DL (ref 7–30)
CALCIUM SERPL-MCNC: 8.5 MG/DL (ref 8.5–10.1)
CANNABINOIDS UR QL SCN: POSITIVE
CHLORIDE SERPL-SCNC: 109 MMOL/L (ref 94–109)
CO2 BLDCOV-SCNC: 23 MMOL/L (ref 21–28)
CO2 SERPL-SCNC: 24 MMOL/L (ref 20–32)
COCAINE UR QL: NEGATIVE
COLOR UR AUTO: ABNORMAL
CREAT SERPL-MCNC: 0.6 MG/DL (ref 0.52–1.04)
DIFFERENTIAL METHOD BLD: ABNORMAL
EOSINOPHIL # BLD AUTO: 0.1 10E9/L (ref 0–0.7)
EOSINOPHIL NFR BLD AUTO: 1.3 %
ERYTHROCYTE [DISTWIDTH] IN BLOOD BY AUTOMATED COUNT: 12.9 % (ref 10–15)
ETHANOL SERPL-MCNC: <0.01 G/DL
GFR SERPL CREATININE-BSD FRML MDRD: >90 ML/MIN/{1.73_M2}
GLUCOSE SERPL-MCNC: 130 MG/DL (ref 70–99)
GLUCOSE UR STRIP-MCNC: NEGATIVE MG/DL
HCT VFR BLD AUTO: 40.5 % (ref 35–47)
HGB BLD-MCNC: 13.1 G/DL (ref 11.7–15.7)
HGB UR QL STRIP: ABNORMAL
IMM GRANULOCYTES # BLD: 0 10E9/L (ref 0–0.4)
IMM GRANULOCYTES NFR BLD: 0.2 %
INR PPP: 1.04 (ref 0.86–1.14)
KETONES UR STRIP-MCNC: NEGATIVE MG/DL
LACTATE BLD-SCNC: 1 MMOL/L (ref 0.7–2.1)
LEUKOCYTE ESTERASE UR QL STRIP: ABNORMAL
LYMPHOCYTES # BLD AUTO: 5.4 10E9/L (ref 0.8–5.3)
LYMPHOCYTES NFR BLD AUTO: 51.6 %
MCH RBC QN AUTO: 31.4 PG (ref 26.5–33)
MCHC RBC AUTO-ENTMCNC: 32.3 G/DL (ref 31.5–36.5)
MCV RBC AUTO: 97 FL (ref 78–100)
MONOCYTES # BLD AUTO: 0.9 10E9/L (ref 0–1.3)
MONOCYTES NFR BLD AUTO: 8.4 %
MUCOUS THREADS #/AREA URNS LPF: PRESENT /LPF
NEUTROPHILS # BLD AUTO: 4 10E9/L (ref 1.6–8.3)
NEUTROPHILS NFR BLD AUTO: 38 %
NITRATE UR QL: NEGATIVE
NRBC # BLD AUTO: 0 10*3/UL
NRBC BLD AUTO-RTO: 0 /100
OPIATES UR QL SCN: NEGATIVE
PCO2 BLDV: 42 MM HG (ref 40–50)
PCP UR QL SCN: NEGATIVE
PH BLDV: 7.36 PH (ref 7.32–7.43)
PH UR STRIP: 6 PH (ref 5–7)
PLATELET # BLD AUTO: 296 10E9/L (ref 150–450)
PLATELET # BLD EST: ABNORMAL 10*3/UL
PO2 BLDV: 59 MM HG (ref 25–47)
POTASSIUM SERPL-SCNC: 3.6 MMOL/L (ref 3.4–5.3)
PROT SERPL-MCNC: 7.3 G/DL (ref 6.8–8.8)
RBC # BLD AUTO: 4.17 10E12/L (ref 3.8–5.2)
RBC #/AREA URNS AUTO: 1 /HPF (ref 0–2)
RBC MORPH BLD: ABNORMAL
SALICYLATES SERPL-MCNC: <2 MG/DL
SAO2 % BLDV FROM PO2: 89 %
SODIUM SERPL-SCNC: 140 MMOL/L (ref 133–144)
SOURCE: ABNORMAL
SP GR UR STRIP: 1.01 (ref 1–1.03)
SQUAMOUS #/AREA URNS AUTO: 1 /HPF (ref 0–1)
UROBILINOGEN UR STRIP-MCNC: NORMAL MG/DL (ref 0–2)
WBC # BLD AUTO: 10.5 10E9/L (ref 4–11)
WBC #/AREA URNS AUTO: 15 /HPF (ref 0–5)

## 2019-12-14 PROCEDURE — 85610 PROTHROMBIN TIME: CPT | Performed by: EMERGENCY MEDICINE

## 2019-12-14 PROCEDURE — 93010 ELECTROCARDIOGRAM REPORT: CPT | Performed by: INTERNAL MEDICINE

## 2019-12-14 PROCEDURE — 82803 BLOOD GASES ANY COMBINATION: CPT

## 2019-12-14 PROCEDURE — 99285 EMERGENCY DEPT VISIT HI MDM: CPT | Mod: 25

## 2019-12-14 PROCEDURE — 80053 COMPREHEN METABOLIC PANEL: CPT | Performed by: EMERGENCY MEDICINE

## 2019-12-14 PROCEDURE — 96361 HYDRATE IV INFUSION ADD-ON: CPT

## 2019-12-14 PROCEDURE — 25000128 H RX IP 250 OP 636: Performed by: EMERGENCY MEDICINE

## 2019-12-14 PROCEDURE — 25800030 ZZH RX IP 258 OP 636: Performed by: INTERNAL MEDICINE

## 2019-12-14 PROCEDURE — 96374 THER/PROPH/DIAG INJ IV PUSH: CPT

## 2019-12-14 PROCEDURE — 83605 ASSAY OF LACTIC ACID: CPT

## 2019-12-14 PROCEDURE — 99223 1ST HOSP IP/OBS HIGH 75: CPT | Mod: AI | Performed by: INTERNAL MEDICINE

## 2019-12-14 PROCEDURE — 99207 ZZC CDG-MDM COMPONENT: MEETS MODERATE - UP CODED: CPT | Performed by: INTERNAL MEDICINE

## 2019-12-14 PROCEDURE — 80320 DRUG SCREEN QUANTALCOHOLS: CPT | Performed by: EMERGENCY MEDICINE

## 2019-12-14 PROCEDURE — 93005 ELECTROCARDIOGRAM TRACING: CPT

## 2019-12-14 PROCEDURE — 80307 DRUG TEST PRSMV CHEM ANLYZR: CPT | Performed by: EMERGENCY MEDICINE

## 2019-12-14 PROCEDURE — 12000000 ZZH R&B MED SURG/OB

## 2019-12-14 PROCEDURE — 84702 CHORIONIC GONADOTROPIN TEST: CPT | Performed by: EMERGENCY MEDICINE

## 2019-12-14 PROCEDURE — 80329 ANALGESICS NON-OPIOID 1 OR 2: CPT | Performed by: EMERGENCY MEDICINE

## 2019-12-14 PROCEDURE — 85025 COMPLETE CBC W/AUTO DIFF WBC: CPT | Performed by: EMERGENCY MEDICINE

## 2019-12-14 PROCEDURE — 80329 ANALGESICS NON-OPIOID 1 OR 2: CPT | Performed by: INTERNAL MEDICINE

## 2019-12-14 PROCEDURE — 81001 URINALYSIS AUTO W/SCOPE: CPT | Performed by: EMERGENCY MEDICINE

## 2019-12-14 PROCEDURE — 40000275 ZZH STATISTIC RCP TIME EA 10 MIN

## 2019-12-14 PROCEDURE — 25800030 ZZH RX IP 258 OP 636: Performed by: EMERGENCY MEDICINE

## 2019-12-14 PROCEDURE — 36415 COLL VENOUS BLD VENIPUNCTURE: CPT | Performed by: INTERNAL MEDICINE

## 2019-12-14 RX ORDER — ONDANSETRON 2 MG/ML
4 INJECTION INTRAMUSCULAR; INTRAVENOUS ONCE
Status: COMPLETED | OUTPATIENT
Start: 2019-12-14 | End: 2019-12-14

## 2019-12-14 RX ORDER — NALOXONE HYDROCHLORIDE 0.4 MG/ML
.1-.4 INJECTION, SOLUTION INTRAMUSCULAR; INTRAVENOUS; SUBCUTANEOUS
Status: DISCONTINUED | OUTPATIENT
Start: 2019-12-14 | End: 2019-12-15 | Stop reason: HOSPADM

## 2019-12-14 RX ORDER — SODIUM CHLORIDE 9 MG/ML
INJECTION, SOLUTION INTRAVENOUS CONTINUOUS
Status: DISCONTINUED | OUTPATIENT
Start: 2019-12-14 | End: 2019-12-15

## 2019-12-14 RX ORDER — LIDOCAINE 40 MG/G
CREAM TOPICAL
Status: DISCONTINUED | OUTPATIENT
Start: 2019-12-14 | End: 2019-12-15 | Stop reason: HOSPADM

## 2019-12-14 RX ADMIN — SODIUM CHLORIDE: 9 INJECTION, SOLUTION INTRAVENOUS at 10:22

## 2019-12-14 RX ADMIN — SODIUM CHLORIDE: 9 INJECTION, SOLUTION INTRAVENOUS at 20:44

## 2019-12-14 RX ADMIN — ONDANSETRON HYDROCHLORIDE 4 MG: 2 INJECTION, SOLUTION INTRAMUSCULAR; INTRAVENOUS at 05:37

## 2019-12-14 RX ADMIN — SODIUM CHLORIDE, POTASSIUM CHLORIDE, SODIUM LACTATE AND CALCIUM CHLORIDE 1000 ML: 600; 310; 30; 20 INJECTION, SOLUTION INTRAVENOUS at 08:11

## 2019-12-14 RX ADMIN — SODIUM CHLORIDE 2000 ML: 9 INJECTION, SOLUTION INTRAVENOUS at 02:52

## 2019-12-14 ASSESSMENT — ACTIVITIES OF DAILY LIVING (ADL)
ADLS_ACUITY_SCORE: 10

## 2019-12-14 ASSESSMENT — ENCOUNTER SYMPTOMS: VOMITING: 1

## 2019-12-14 NOTE — ED NOTES
".  [facility]  ED Nurse Handoff Report    Qian Loo is a 24 year old female   ED Chief complaint: Ingestion  . ED Diagnosis:   Final diagnoses:   Suicide attempt by multiple drug overdose, initial encounter   Severe depression (H)   Hypotension due to drugs     Allergies:   Allergies   Allergen Reactions     No Known Drug Allergies      Seasonal Allergies        Code Status: Full Code  Activity level - Baseline/Home:  Independent. Activity Level - Current:   Stand by Assist. Lift room needed: No. Bariatric: No   Needed: No   Isolation: No. Infection: Not Applicable.     Vital Signs:   Vitals:    19 0740 19 0745 19 0800 19 0830   BP: (!) 84/66 (!) 86/63 97/70 92/66   Pulse:  75 79 63   Resp: 17      Temp:       TempSrc:       SpO2: 98% 99% 100% 100%       Cardiac Rhythm:  ,   Cardiac  Cardiac Rhythm: Normal sinus rhythm  Pain level: 0-10 Pain Scale: 0  Patient confused: No. Patient Falls Risk: Yes.   Elimination Status: Has voided   Patient Report - Initial Complaint: 24 year old female with a history of depression and anxiety who presents to the emergency department today for evaluation of drug overdose. Per the patient's boyfriend they got into an argument this evening and after this she took an unknown mix of pills tonight but denies taking any Tylenol or antidepressants. Her boyfriend had to stop her from taking more pills and was able to get her to vomit several times. The patient's significant other reports that 2 weeks ago the patient was \"talked into\" aborting her twin pregnancy of 2 months by her family who then \"disowned her\" despite this. Her boyfriend states the patient has been more depressed surrounding this family issue and was admitted at [facility] for her depression shortly before getting the . She denies any past attempts to harm herself with pills. She denies any other symptoms at this time. Focused Assessment: General Appearance - " "Speech Pattern: Quiet   Psychiatric Symptoms / Stressors - Mood/Behavior: sad; cooperative   Ingestion - Substance:: Unknown amount of pills. Pt prescribed seroquel, prozac, reglan, vitamins, tylenol. Pt states she knows there were melatonin in the handful of pills, but unsure what else she took.  Time of Ingestion:: 0200  Stated Quantity:: unknown  Stated Route:: oral  Vomited from Ingestion?: Yes  If yes, how many times:: 4  Suicide Attempt?: Yes  Accidental Overdose?: No   Security Measures - Security Called:: 0256  Patient Hold Status:: Patient \"Watch\"  Patient Searched?: Yes  Safety Screen: Yes  Personal Belongings Secured?: Yes (significant other brought them to the car)   Tests Performed: labs. Abnormal Results: .  Labs Ordered and Resulted from Time of ED Arrival Up to the Time of Departure from the ED   CBC WITH PLATELETS DIFFERENTIAL - Abnormal; Notable for the following components:       Result Value    Absolute Lymphocytes 5.4 (*)     All other components within normal limits   COMPREHENSIVE METABOLIC PANEL - Abnormal; Notable for the following components:    Glucose 130 (*)     Urea Nitrogen 6 (*)     All other components within normal limits   HCG QUANTITATIVE PREGNANCY - Abnormal; Notable for the following components:    HCG Quantitative Serum 22 (*)     All other components within normal limits   ROUTINE UA WITH MICROSCOPIC - Abnormal; Notable for the following components:    Blood Urine Small (*)     Leukocyte Esterase Urine Small (*)     WBC Urine 15 (*)     Bacteria Urine Few (*)     Mucous Urine Present (*)     All other components within normal limits   DRUG ABUSE SCREEN 77 URINE (FL, RH, SH) - Abnormal; Notable for the following components:    Cannabinoids Qual Urine Positive (*)     All other components within normal limits   ISTAT  GASES LACTATE JOSEMANUEL POCT - Abnormal; Notable for the following components:    PO2 Venous 59 (*)     All other components within normal limits   ALCOHOL ETHYL "   ACETAMINOPHEN LEVEL   SALICYLATE LEVEL   INR   PERIPHERAL IV CATHETER   IV ACCESS   CARDIAC CONTINUOUS MONITORING   ISTAT CG4 GASES LACTATE JOSEMANUEL NURSING POCT     .  No orders to display      Treatments provided: 3L fluids  Family Comments: NA  OBS brochure/video discussed/provided to patient:  N/A  ED Medications:   Medications   lactated ringers BOLUS 1,000 mL (1,000 mLs Intravenous New Bag 12/14/19 0811)   0.9% sodium chloride BOLUS (0 mLs Intravenous Stopped 12/14/19 9821)   ondansetron (ZOFRAN) injection 4 mg (4 mg Intravenous Given 12/14/19 2299)     Drips infusing:  No  For the majority of the shift, the patient's behavior Green. Interventions performed were NA.     Severe Sepsis OR Septic Shock Diagnosis Present: No      ED Nurse Name/Phone Number: Princess Thorne RN ERA  8:47 AM    RECEIVING UNIT ED HANDOFF REVIEW    Above ED Nurse Handoff Report was reviewed: Yes  Reviewed by: Marguerite Payne RN on December 14, 2019 at 9:01 AM

## 2019-12-14 NOTE — ED PROVIDER NOTES
"History     Chief Complaint:  Ingestion    HPI  Qian Loo is a 24 year old female with a history of depression and anxiety who presents to the emergency department today for evaluation of drug overdose. Per the patient's boyfriend they got into an argument this evening and after this she took an unknown mix of pills tonight but denies taking any Tylenol or antidepressants. Her boyfriend had to stop her from taking more pills and was able to get her to vomit several times. The patient's significant other reports that 2 weeks ago the patient was \"talked into\" aborting her twin pregnancy of 2 months by her family who then \"disowned her\" despite this. Her boyfriend states the patient has been more depressed surrounding this family issue and was admitted at [facility] for her depression shortly before getting the . She denies any past attempts to harm herself with pills. She denies any other symptoms at this time.      Allergies:  No known drug allergies    Medications:    Prozac  Reglan  Serouel    Past Medical History:    Depression  Suicidal ideations    Past Surgical History:    History reviewed. No pertinent surgical history.    Family History:    She was adopted.    Social History:  The patient reports that she has never smoked. She has never used smokeless tobacco. She reports previous alcohol use. She reports current drug use.   PCP: Lora Garcia  Marital Status: Single [1]      Review of Systems   Gastrointestinal: Positive for vomiting.   Psychiatric/Behavioral: Positive for suicidal ideas.   All other systems reviewed and are negative.      Physical Exam     Patient Vitals for the past 24 hrs:   BP Temp Temp src Pulse Heart Rate Resp SpO2   19 0715 (!) 82/61 -- -- 89 96 -- 100 %   19 0645 (!) 81/76 -- -- -- 90 -- 99 %   19 0630 90/60 -- -- 81 85 13 99 %   19 0625 -- -- -- -- 80 13 100 %   19 0620 95/65 -- -- 86 112 10 100 %   19 0615 (!) 84/62 -- -- " -- 79 -- 100 %   12/14/19 0600 97/60 -- -- 102 87 10 100 %   12/14/19 0550 91/63 -- -- 93 86 12 100 %   12/14/19 0530 99/58 -- -- 112 97 12 99 %   12/14/19 0520 94/49 -- -- 113 107 14 99 %   12/14/19 0510 92/60 -- -- 99 101 13 99 %   12/14/19 0440 92/63 -- -- 94 95 12 99 %   12/14/19 0430 94/59 -- -- 90 89 13 100 %   12/14/19 0420 106/79 -- -- 105 110 19 100 %   12/14/19 0400 (!) 87/58 -- -- 97 97 13 100 %   12/14/19 0350 92/61 -- -- 96 96 10 100 %   12/14/19 0340 (!) 89/60 -- -- 91 88 12 100 %   12/14/19 0330 91/57 -- -- 82 84 11 100 %   12/14/19 0320 93/58 -- -- 90 92 21 100 %   12/14/19 0310 95/73 -- -- 73 75 10 100 %   12/14/19 0300 93/71 -- -- 74 78 9 100 %   12/14/19 0250 94/63 -- -- 73 73 10 100 %   12/14/19 0240 (!) 88/61 -- -- 88 -- -- --   12/14/19 0239 -- 98.6  F (37  C) Temporal -- -- 14 --   12/14/19 0230 (!) 87/61 -- -- 67 -- -- 98 %     Physical Exam  General: Nontoxic. Appears somnolent.   Head:  Scalp, face, and head appear normal, atraumatic   Eyes:  Pupils are equal, round, and reactive to light, EOMI, no nystagmus     Conjunctivae non-injected and sclerae white  ENT:    The external nose is normal    Pinnae are normal    The oropharynx is normal, mucous membranes moist    Uvula is in the midline  Neck:  Normal range of motion    There is no rigidity noted    Trachea is in the midline  CV:  Regular rate and rhythm     Normal S1/S2, no S3/S4    No murmur or rub  Resp:  Lungs are clear and equal bilaterally    There is no tachypnea    No increased work of breathing    No rales, wheezing, or rhonchi  GI:  Abdomen is soft, no rigidity or guarding    No distension, or mass    No tenderness or rebound tenderness   MS:  Normal muscular tone    Symmetric motor strength    No lower extremity edema  Skin:  No rash or acute skin lesions noted  Neuro: Patient somnolent, arouses to voice. Follows commands.     CN II - XII intact    Speech clear, fluent, and normal    Strength 5/5 and symmetric in bilateral  upper and lower extremities.    SILT throughout.    No tremor.     No meningismus   Psych:  Flat depressed affect.  + psychomotor slowing. Patient endorses overdose on medications in attempt to harm self. Denies HI. No evidence of response to internal stimuli.     Emergency Department Course     ECG:  ECG taken at 0236, ECG read at 0239  Normal sinus rhythm with sinus arrhythmia  Nonspecific T wave abnormality   Abnormal ECG  Rate 92 bpm. NV interval 120 ms. QRS duration 64 ms. QT/QTc 384/474 ms. P-R-T axes 57 34 46.    Laboratory:  Laboratory findings were communicated with the patient who voiced understanding of the findings.    CBC:  o/w WNL. (WBC 10.5, HGB 13.3, )   CMP: Glucose 130 (H), BUN 6 (L), o/w WNL (Creatinine: 0.60)    Alcohol ethyl: <0.01  Acetaminophen level <2  Salicylate acid level <2  INR: 1.04    UA: urine blood small, leukocyte small, WBC/HPF 15 (H), bacteria few, mucus present, o/w Negative  HCG Quantitative: 22  Drug abuse screen 77 urine: positive for cannabinoids    Interventions:  0252 NS 2L IV Bolus    Emergency Department Course:  Past medical records, nursing notes, and vitals reviewed.  0233: I performed an exam of the patient and obtained history, as documented above.     IV was inserted and blood was drawn for laboratory testing, results above.  The patient provided a urine sample here in the emergency department. This was sent for laboratory testing, findings above.    0318: I spoke with poison control regarding this patient.     520 I spoke with poison control regarding this patient.     0706: I rechecked the patient. Explained findings to patient and boyfriend.    I personally reviewed the laboratory/imaging results with the Patient and significant other and answered all related questions prior to admission    Findings and plan explained to the Patient and significant other who consents to admission.     0754: Discussed the patient with Dr. Tsai, who will admit the patient  to a medical bed for further monitoring, evaluation, and treatment.     Impression & Plan      Medical Decision Making:  Qian Loo is a 24 year old female who presents for evaluation after reportedly taking a handful of unknown pills at home.  Patient presents with her significant other who reports that there is no blood pressure medication such as beta blockers calcium channel blockers or any other cardiac medications in the house that she would have access to.  He denies that there are any opiates or narcotics present as well.  Patient was previously treated for twin pregnancy with Unisom, Reglan, melatonin as well as Seroquel and Prozac for her underlying depression.  It was felt that these medications were chiefly responsible for her overdose.  She presented somnolent but arousable to voice and follows commands appropriately.  She had low blood pressures secondary to drug overdose but otherwise had good peripheral perfusion without tachycardia or other sign of significant underlying shock.  Lactic acid was normal.  This is consistent with drug overdose.   A broad workup was considered including sequelae of drug overdose (respiratory failure, seizure, arrhythmia, liver or kidney injury, hypotension, metabolic derangement, hypertension, serotonin syndrome, NMS, etc), volatile alcohol ingestion, ethanol ingestion, encephalitis, meningitis, tylenol or salicylate ingestion, etc. Tylenol and salicylate levels were negative.  Ethanol negative.  Urine drug screen positive only for cannabinoids.   They are maintaining an airway and vitals are acceptable at this point.  Patient had persistent somnolence and low blood pressures despite greater than 5 hours of monitoring in the emergency department therefore the case was discussed with the hospitalist and the patient be admitted to medicine for further monitoring evaluation and treatment pending metabolization of her drug overdose.  Patient will ultimately require  psychiatric admission given her serious overdose/suicide attempt.  Patient be admitted on MICHELLE hold with one-to-one sitter.  Quantitative hCG is mildly positive.  This is likely secondary to her recent  at [facility] of a prior twin pregnancy.  She has no abdominal pain, abnormal abdominal exam findings, vaginal bleeding or other symptoms to suggest acute underlying intra-abdominal process or complication related to her recent procedure or pregnancy.  The patient was ambulatory to the restroom without dizziness, near syncope or other concern or complaint.  Patient was admitted in stable condition    Critical Care Time:   Upon my evaluation, this patient had a critical illness with high probability of imminent or life-threatening deterioration due to polysubstance overdose, hypotension, which required my direct attention, intervention, and personal management.    I have personally provided 45 minutes of critical care time exclusive of time spent on separately billable procedures. Time includes review of laboratory data, radiology results, discussion with consultants, reassessment of the patient and monitoring for potential decompensation. Interventions were performed as documented above.         Diagnosis:    ICD-10-CM    1. Suicide attempt by multiple drug overdose, initial encounter T50.912A Lactic acid whole blood     ISTAT gases lactate elsa POCT     ISTAT gases lactate elsa POCT     CANCELED: Lactic acid whole blood   2. Severe depression (H) F32.2    3. Hypotension due to drugs I95.2        Disposition:   Admitted to medical telemetry      Scribe Disclosure:  Gloria MARTINEZ, am serving as a scribe at 2:33 AM on 2019 to document services personally performed by Connor Weinstein MD based on my observations and the provider's statements to me.    [facility] EMERGENCY DEPARTMENT       Connor Weinstein MD  19 0805

## 2019-12-14 NOTE — PLAN OF CARE
Orientation: A/O x4  VS: B/P soft, Afebrile  Tele: SR   LS: clear bilat  GI: tolerating reg diet, reports BM yesterday  : voiding w/o difficulty per sitter  Skin: intact, tattoos  Activity: SBA to independent  Pain: denies  Lines: PIV in L and R arm, infusing 100ml/hr NS  Plan: psych consult, sitter present, room stripped.  Marguerite Payne RN on 12/14/2019 at 12:03 PM

## 2019-12-14 NOTE — ED TRIAGE NOTES
Pt presents to ED with significant other after ingestion an unknown amount of unknown pills, potentially tylenol and antidepressants 30 mins PTA, significant other states she vomited 4-5 times after this.  Pt sleepy.  ABCs intact

## 2019-12-14 NOTE — ED NOTES
"Pt's S/O states that she recently had an  of twins and that she has been very depressed since then especially because she did it to please her family and they still are not accepting her.  S/O states that he knows she has had \"severe depressive episodes\" in the past but not sure if she's ever had a suicide attempt in the past.  Pt very tearful when  brought up.  Pt is sleepy, however answering some questions.  Pt and S/O explained of MICHELLE status, S/O brought pts belongings out to the car.  Security briefed on situation and is watching the patient.  Patient searched.    "

## 2019-12-14 NOTE — H&P
[facility]  Hospitalist Admission Note  Name: Qian Loo    MRN: [id number]  YOB: 1995    Age: 24 year old  Date of admission: 2019  Primary care provider: Lora Garcia    Chief Complaint:  Pill overdose    Assessment and Plan:   Suicide attempt by pill ingestion, MDD, anxiety: Story of MDD and anxiety.  Was actually just hospitalized at [facility] psychiatry unit last month from 11/10 through  for depression.  Significant social stressors including an unplanned pregnancy of twins which she subsequently had an  following discharge from the psychiatric unit.  Now here following ingestion of multiple pills, although it is not entirely clear what she took.  She took a handful of a mix of pills last night around 2 AM after an argument.  Her significant other stop her from taking any more medications and got her to induce vomiting.  Possible medications include Unisom, melatonin, acetaminophen.  She currently denies acetaminophen ingestion.  She was prescribed Seroquel, Prozac, Zofran, Compazine on discharge from the hospital last month, but she said she threw all of these out previously.  The denies any other prescriptions in the home.  Acetaminophen level and salicylate level are negative.  Urine drug screen positive for cannabinoids.  EKG shows normal intervals.  Mild hypertension as below, and I will think this is significantly related.  She was more somnolent, but is now alert answering questions.  -Repeated EKG to make sure intervals okay given history of Seroquel, Prozac, Zofran, Compazine prescriptions.  Intervals remain within normal limits  -Repeat acetaminophen level to make sure still negative  -Monitor overnight  -Suicide precautions, 24-hour sitter  -MICHELLE hold until able to be seen by psychiatry consultation, then if in agreement recommend transfer to inpatient psychiatry as early as tomorrow as should be medically cleared by then.  If  patient wanting to leave and seen by psychiatry would need a 72-hour hold placed    Mild hypotension: Low blood pressure in the ED to the 80s systolic although MAP greater than 65.  Received 3 L of IV crystalloid.  Lactic acid not elevated.  No sign of infection.  Discussed with patient and she is asymptomatic from this.  She endorses a chronic low blood pressure.  She and her significant other deny any blood pressure medications in the home.  Based on what she could have ingested per interview with her and her significant other, I do not see anything that would cause any significant hypotension.  Monitor for now unless worsening or developing symptoms.    Recent pregnancy: Recent pregnancy with twins.  Now s/p  at approximately 2 months.    DVT Prophylaxis: Pneumatic Compression Devices  Code Status: Full Code by default  FEN: Early diet, normal saline at 100 ml/hr  Discharge Dispo: Anticipate likely need for transfer to inpatient psychiatry unit pending psychiatry consultation  Estimated Disch Date / # of Days until Disch: Monitor overnight and anticipate she will be medically cleared to transfer to inpatient psychiatry tomorrow       History of Present Illness:  Qian Loo is a 24 year old female with PMH including MDD, anxiety, and recent pregnancy who presents following an intentional suicide attempt by pill ingestion.  The patient provides some history and her significant other provide some history as well.  She was recently hospitalized at [facility] psychiatry unit for depression for 2 days approximately 30 days ago.  She was feeling depressed regarding a 2-month pregnancy of twins that was unintentional and social issues regarding family opinion of this.  She was prescribed Seroquel and Prozac, however she says she threw these out.  She was also having nausea with the pregnancy and had Zofran, Compazine, and Reglan, but she also threw these out as it resolved when she decided to have  an  shortly after discharge from the psychiatry unit.  She now was in a fight with her significant other and she ingested a handful of mixed pills around 2 AM.  She is not entirely sure what the medications were.  He stopped her from taking more pills and had her induce vomiting a few times.  Possible medications in the home would be melatonin, Unisom, acetaminophen per the significant other.  She denies taking acetaminophen.  She again states that she is throughout all of the nausea and psychiatry medications that she was previously prescribed a while ago.  They do not have any blood pressure pills in the home and she reports chronically low blood pressure.  She was more somnolent in the ED, but is much more alert now.  She denies any pain, lightheadedness, chest pain, shortness of breath.      History obtained from patient, patient's significant other, medical record, and from Dr. Weinstein in the emergency department.  Mildly hypertensive throughout her time in the emergency department with systolic blood pressure in the 80s, although MAP greater than 65.  She received 3 L of IV crystalloid and blood pressures in the 90s.  She is asymptomatic from a low blood pressure.  She was initially very somnolent, but now.  Her CBC and CMP are unremarkable.  Lactic acid not elevated at 1.0.  Ethanol, acetaminophen, and salicylate levels are undetectable.  Urine drug screen positive for cannabinoids.  EKG shows normal sinus rhythm with normal intervals.  She was placed on an MICHELLE hold that will be continued.  Request for admission due to persistent hypotension and somnolence.  Will request psychiatry consultation.  Suicide precautions placed.     Past Medical History reviewed:  Past Medical History:   Diagnosis Date     Anxiety      Hyperemesis      MDD (major depressive disorder)      NO ACTIVE PROBLEMS      Past Surgical History reviewed:  No past surgical history on file.  Social History reviewed:  Social History      Tobacco Use     Smoking status: Never Smoker     Smokeless tobacco: Never Used     Tobacco comment: non smoking home   Substance Use Topics     Alcohol use: Not Currently     Comment: 2-3 times per month, 1/2 beer each time     Social History     Social History Narrative    Born in [facility] in [facility].  Lived in orphanage in [facility] [place]. Immigrated to [place] at age 3.  Negative PPD.     Family History reviewed:  Family History   Adopted: Yes   Problem Relation Age of Onset     Unknown/Adopted Mother      Unknown/Adopted Father      Unknown/Adopted Maternal Grandmother      Unknown/Adopted Maternal Grandfather      Unknown/Adopted Other      Allergies:  Allergies   Allergen Reactions     No Known Drug Allergies      Seasonal Allergies      Medications:  Prior to Admission medications    Medication Sig Last Dose Taking? Auth Provider   etonogestrel (IMPLANON/NEXPLANON) 68 MG IMPL 1 each by Subdermal route once Unknown at Unknown time  Unknown, Entered By History     Review of Systems:  A Comprehensive greater than 10 system review of systems was carried out.  Pertinent positives and negatives are noted above.  Otherwise negative.     Physical Exam:  Blood pressure 97/70, pulse 79, temperature 98.6  F (37  C), temperature source Temporal, resp. rate 17, SpO2 100 %.  Wt Readings from Last 1 Encounters:   11/10/19 40.4 kg (89 lb)     Exam:  Constitutional: Awake, NAD   Eyes: sclera white   HEENT: atraumatic, MMM  Respiratory: no respiratory distress, lungs cta bilaterally, no crackles or wheeze  Cardiovascular: RRR.  No murmur   GI: non-tender, not distended, bowel sounds present  Skin: no rash or lesions, acyanotic  Musculoskeletal/extremities: atraumatic, no major deformities. No edema  Neurologic: A&O, speech clear  Psychiatric: Flat affect, poor eye contact    Lab and imaging data personally reviewed:  Labs:  Recent Labs   Lab 12/14/19  0739   PHV 7.36   PO2V 59*   PCO2V 42   HCO3V 23     Recent Labs    Lab 12/14/19  0233   WBC 10.5   HGB 13.1   HCT 40.5   MCV 97        Recent Labs   Lab 12/14/19  0233      POTASSIUM 3.6   CHLORIDE 109   CO2 24   ANIONGAP 7   *   BUN 6*   CR 0.60   GFRESTIMATED >90   GFRESTBLACK >90   ANALI 8.5   PROTTOTAL 7.3   ALBUMIN 3.7   BILITOTAL 0.2   ALKPHOS 71   AST 17   ALT 15     Recent Labs   Lab 12/14/19  0233   INR 1.04     Recent Labs   Lab 12/14/19  0739   LACT 1.0     Recent Labs   Lab 12/14/19  0419   COLOR Straw   APPEARANCE Clear   URINEGLC Negative   URINEBILI Negative   URINEKETONE Negative   SG 1.006   UBLD Small*   URINEPH 6.0   PROTEIN Negative   NITRITE Negative   LEUKEST Small*   RBCU 1   WBCU 15*     Ethanol level, acetaminophen level, salicylate level undetectable  hCG level 22    EKG: NSR with normal intervals    Imaging:  None obtained    Tr Tsai MD  Hospitalist  [facility]

## 2019-12-14 NOTE — PHARMACY-ADMISSION MEDICATION HISTORY
Admission medication history interview status for this patient is complete. See [facility] admission navigator for allergy information, prior to admission medications and immunization status.     Medication history interview source(s):Patient and patient's partner  Medication history resources (including written lists, pill bottles, clinic record): care everywhere  Primary pharmacy:[facility]    Changes made to PTA medication list:  Added: nexplanon implant  Deleted: all  Changed: none    Actions taken by pharmacist (provider contacted, etc):None     Additional medication history information:None    Medication reconciliation/reorder completed by provider prior to medication history?  No     Do you take OTC medications (eg tylenol, ibuprofen, fish oil, eye/ear drops, etc)? No     For patients on insulin therapy: No      Prior to Admission medications    Medication Sig Last Dose Taking? Auth Provider   etonogestrel (IMPLANON/NEXPLANON) 68 MG IMPL 1 each by Subdermal route once Unknown at Unknown time  Unknown, Entered By History

## 2019-12-15 VITALS
OXYGEN SATURATION: 97 % | DIASTOLIC BLOOD PRESSURE: 63 MMHG | RESPIRATION RATE: 16 BRPM | SYSTOLIC BLOOD PRESSURE: 95 MMHG | HEART RATE: 72 BPM | TEMPERATURE: 98 F

## 2019-12-15 LAB
ANION GAP SERPL CALCULATED.3IONS-SCNC: 6 MMOL/L (ref 3–14)
BUN SERPL-MCNC: 3 MG/DL (ref 7–30)
CALCIUM SERPL-MCNC: 8.1 MG/DL (ref 8.5–10.1)
CHLORIDE SERPL-SCNC: 112 MMOL/L (ref 94–109)
CO2 SERPL-SCNC: 23 MMOL/L (ref 20–32)
CREAT SERPL-MCNC: 0.51 MG/DL (ref 0.52–1.04)
GFR SERPL CREATININE-BSD FRML MDRD: >90 ML/MIN/{1.73_M2}
GLUCOSE SERPL-MCNC: 91 MG/DL (ref 70–99)
INTERPRETATION ECG - MUSE: NORMAL
POTASSIUM SERPL-SCNC: 3.8 MMOL/L (ref 3.4–5.3)
SODIUM SERPL-SCNC: 141 MMOL/L (ref 133–144)

## 2019-12-15 PROCEDURE — 80048 BASIC METABOLIC PNL TOTAL CA: CPT | Performed by: INTERNAL MEDICINE

## 2019-12-15 PROCEDURE — 25800030 ZZH RX IP 258 OP 636: Performed by: INTERNAL MEDICINE

## 2019-12-15 PROCEDURE — 36415 COLL VENOUS BLD VENIPUNCTURE: CPT | Performed by: INTERNAL MEDICINE

## 2019-12-15 PROCEDURE — 99238 HOSP IP/OBS DSCHRG MGMT 30/<: CPT | Performed by: INTERNAL MEDICINE

## 2019-12-15 RX ADMIN — SODIUM CHLORIDE: 9 INJECTION, SOLUTION INTRAVENOUS at 06:20

## 2019-12-15 ASSESSMENT — ACTIVITIES OF DAILY LIVING (ADL)
ADLS_ACUITY_SCORE: 10

## 2019-12-15 NOTE — PLAN OF CARE
Pain: No c/o pain  LOC: alert and oriented.  Significant other at bedside.  Sitter at all times. Suicide precautions.  Mobility: Independent in room  Lungs: clear. 97% RA  Tele: SR  GI: regular diet.  Safe tray  : voiding without difficulty  IV: 2 PIV saline locked  Other: Psych consult today.  Possible discharge to inpatient psych bed depending on Psych recommendations.    14:20 Pt has been seen by psych.  See MD order for discharge.      14:28 Pt discharged home at this time accompanied by boyfriend. Pt verbalizes understanding of discharge instructions.  She acknowledges receipt of all belongings.

## 2019-12-15 NOTE — PROGRESS NOTES
Psychiatry still to see.  Medically ready for discharge to inpatient psychiatry unit if that is what is recommend by psychiatry consult today.  Recently at [facility] last month.

## 2019-12-15 NOTE — CONSULTS
Care Transition Initial Assessment - ROHAN     Active Problems:    Suicide attempt by drug ingestion (H)       DATA  Lives With: significant other      Quality of Family Relationships: stressful  Description of Support System: Supportive, Involved  Who is your support system?: Significant Other, Other (specify)(SO's family )    Identified issues/concerns regarding health management: Pt may need psych placement.     Quality of Family Relationships: stressful       ASSESSMENT  Concerns to be addressed: Pt may need inpatient psych placement.    SW reviewed pt's chart. Pt was seen by the Psychiatrist. Pt is not hold-able and thinks another hospitalization will make her feel worse. Pt agrees to do outpatient counseling, no inpatient psych placement is necessary. Pt has support from her boyfriend of 2.5 years with whom she lives.       PLAN  Patient anticipates discharging to:  Home with outpatient counseling services. SWS is available upon request.     ALF Sarmiento, [facility]  Casual    [facility]  [phone]

## 2019-12-15 NOTE — PLAN OF CARE
Orientation: Alert and oriented x4  VSS. 98% on RA.   Tele: NSR. HR 70.   LS: Clear, denies SOB/FLORES  GI: BS audible/active x4, tolerating PO. Passing gas. No BM this shift. Denies N/V.   : Adequate urine output. Yes  Skin: Intact  Activity: SBA. Pt slept comfortably throughout shift. Sitter at the bedside for safety.  Pain: 0/10  Plan: Supportive cares, Psych consulted, suicide precautions, discharge TBD. Continue with current cares

## 2019-12-15 NOTE — DISCHARGE SUMMARY
[facility]  Discharge Summary  Name: Qian Loo    MRN: [id number]  YOB: 1995    Age: 24 year old  Date of Discharge:  12/15/2019  Date of Admission: 12/14/2019  Primary Care Provider: Lora Garcia  Discharge Physician:  Tr Tsai MD  Discharging Service:  Hospitalist      Discharge Diagnoses:  Suicide attempt by medication ingestion  Hypotension  MDD  Recent pregnancy     Hospital Course:  Summary of stay:  Qian Loo is a 24 year old female with PMH including MDD, anxiety, and recent pregnancy who was admitted on 12/14/2019 following in intentional medication overdose in a suicide attempt.  Not entirely clear what pills were ingested, but it is thought that it was likely primarily Unisom and melatonin.  A handful of pills ingested and then her significant other got her to induce vomiting.  Hypotensive in the ED to the 80 systolic although map greater than 65.  Initial labs including CBC, CMP, and lactic acid were normal.  Urine drug screen positive for cannabinoids.  Ethanol, acetaminophen, salicylate levels were undetectable.  Serial EKG showing NSR with normal intervals.  Repeat acetaminophen level was obtained 12 hours later and was undetectable.  She was initially somnolent and admitted on a MICHELLE hold until she can be seen by psychiatry.  Suicide precautions were in place.  Blood pressure improved with IV fluids, but remained relatively low around 100 systolic which is her baseline.  She was seen by psychiatry earlier today.  She denies suicidal ideation at this time or thoughts of self-harm.  She plans to speak to her family about her recent arguments with them.  She is not interested in starting medication at this time and will consider speaking with a psychologist and psychiatrist in the outpatient setting, resources provided by psychiatry service here.  Patient contracted for safety with her significant other and the psychiatry team here who felt the  MICHELLE hold could be discontinued and that she can discharge home with the support of her significant other as opposed to transfer to inpatient psychiatry unit.     Discharge Disposition:  Discharged to home     Allergies:  Allergies   Allergen Reactions     No Known Drug Allergies      Seasonal Allergies         Discharge Medications:   Current Discharge Medication List      CONTINUE these medications which have NOT CHANGED    Details   etonogestrel (IMPLANON/NEXPLANON) 68 MG IMPL 1 each by Subdermal route once              Condition on Discharge:  Discharge condition: Good   Discharge vitals: Blood pressure 95/63, pulse 72, temperature 98  F (36.7  C), temperature source Oral, resp. rate 16, SpO2 97 %.   Code status on discharge: Full Code     History of Illness:  See detailed admission note for full details.    Physical Exam:  Blood pressure 95/63, pulse 72, temperature 98  F (36.7  C), temperature source Oral, resp. rate 16, SpO2 97 %.  Wt Readings from Last 1 Encounters:   11/10/19 40.4 kg (89 lb)     Constitutional: Awake, NAD   Eyes: sclera white   HEENT:  MMM  Respiratory: no respiratory distress, lungs cta bilaterally, no crackles or wheeze  Cardiovascular: RRR.  No murmur   GI: non-tender, not distended, bowel sounds present  Lymph/Hematologic: no cervical, axillary, inguinal adenopathy  Skin: no rash or lesions, acyanotic  Musculoskeletal/extremities: atraumatic, no major deformities. No edema  Neurologic: A&O, speech clear   Psychiatric: calm, flat affect, some eye contact today.  Denies suicidal ideation or thoughts of self-harm    Procedures other than Imaging:  None     Imaging:  None     Consultations:  Consultation during this admission received from psychiatry.       Recent Lab Results:  Recent Labs   Lab 12/14/19  0739   PHV 7.36   PO2V 59*   PCO2V 42   HCO3V 23     Recent Labs   Lab 12/14/19  0233   WBC 10.5   HGB 13.1   HCT 40.5   MCV 97        Recent Labs   Lab 12/15/19  0700 12/14/19 0233     140   POTASSIUM 3.8 3.6   CHLORIDE 112* 109   CO2 23 24   ANIONGAP 6 7   GLC 91 130*   BUN 3* 6*   CR 0.51* 0.60   GFRESTIMATED >90 >90   GFRESTBLACK >90 >90   ANALI 8.1* 8.5   PROTTOTAL  --  7.3   ALBUMIN  --  3.7   BILITOTAL  --  0.2   ALKPHOS  --  71   AST  --  17   ALT  --  15     Recent Labs   Lab 12/14/19  0739   LACT 1.0     Recent Labs   Lab 12/14/19  0419   COLOR Straw   APPEARANCE Clear   URINEGLC Negative   URINEBILI Negative   URINEKETONE Negative   SG 1.006   UBLD Small*   URINEPH 6.0   PROTEIN Negative   NITRITE Negative   LEUKEST Small*   RBCU 1   WBCU 15*     UDS positive for cannabinoids  Acetaminophen level undetectable  Salicylate level undetectable  Ethanol level undetectable     Pending Results:    Unresulted Labs Ordered in the Past 30 Days of this Admission     No orders found for last 31 day(s).         These results will be followed up by patient's primary care provider.    Discharge Instructions and Follow-Up:   Discharge Procedure Orders   Reason for your hospital stay   Order Comments: You were hospitalized following an intentional medication overdose.  These medications are out of your system and no harm was done to your body from them.  You have been seen by our psychiatrist while here.  We both agree that it would be very helpful for you to meet with a psychologist in the outpatient setting to help work through your depression and recent social stressors.  Resources for these services have been provided and I highly recommend you do this.  If you are having any suicidal thoughts please return for evaluation or even call 911 if you need to.     Follow-up and recommended labs and tests    Order Comments: Follow up with primary care provider, Lora Campbell, within 7 days for hospital follow- up for depression    Highly encourage follow up with psychology and psychiatry.  Resources provided.     Activity   Order Comments: Your activity upon discharge: activity as tolerated      Order Specific Question Answer Comments   Is discharge order? Yes      Full Code     Order Specific Question Answer Comments   Code status determined by: Discussion with patient/legal decision maker      Diet   Order Comments: Follow this diet upon discharge: Orders Placed This Encounter      Combination Diet Regular Diet Adult     Order Specific Question Answer Comments   Is discharge order? Yes          ITr MD, personally saw the patient today and spent less than or equal to 30 minutes discharging this patient.    Tr Tsai MD

## 2019-12-15 NOTE — CONSULTS
"Consult Date:  12/15/2019      PSYCHIATRY CONSULTATION      IDENTIFICATION:  The patient is a 24-year-old adopted -American single female seen for psychiatric consultation at request of Dr. Tsai after she took a handful of pills in front of her boyfriend with whom she had conflict earlier that day.        HISTORY OF PRESENT ILLNESS:  She had been hospitalized in mid-2019 for 2 days for depression after she had an unplanned pregnancy, apparently twins, which was terminated in an elective  at her adoptive parents' urgings.  She tells me that they have ongoing issues with her boyfriend of 2-1/2 years, Giuliano.  He was described in the last admission note at [facility] as \"not supportive.\"  The patient tells me, \"My parents put that in my head.\"  He was initially by her bedside, but the patient was interviewed alone and then with boyfriend.  He states he did have issues with not being included in the decision for the  and that Qian has difficulty standing up and making her own decisions.  They both agree that they should be doing family planning to avoid a stressful unplanned pregnancy.  She does agree to go back to the [facility].  She had not taken the Prozac or Seroquel when she was discharged beyond possibly several days and feels that she would not consider more psychiatric medications at this point.  She volunteered that her parents are always trying to push her to go to counseling or take medications for any \"for any problem.\"  Her mother, she states ,it is unaware about this admission, but she is agreeable to speak to her mother about her feelings, especially around the termination.  The patient's urine tox screen was positive for marijuana.  She does report episodic use.  They are living financially independently, but she is unemployed and states this is a large stressor for her and she will be looking for work as she goes on.  She agrees to no alcohol or marijuana use " going forward.  She is able again to contract to her boyfriend, Giuliano, and the staff for no self-harm      PAST PSYCHIATRIC HISTORY:  No history of learning disorder, ADHD, or eating disorder.  She does have a history of depression, was admitted from 11/10 to 11/12 as above at [facility].  She has had suicidal ideation in the past.  This is her first gesture.  At the hospitalization, she was 9 weeks pregnant with twins  and having significant morning sickness.  Her discharge diagnoses were major depressive disorder recurrent, generalized anxiety disorder, and cannabis use disorder.  She did fail a followup with a nurse practitioner at [facility] with plans to reschedule.  No history of manic or psychotic episodes.      PAST MEDICAL HISTORY:  Recent EAB.  No history of closed head trauma, seizure or major medical problems.  She reports a miscarriage one and a half to two years ago.      FAMILY HISTORY:  The patient is adopted.      SOCIAL HISTORY:  The patient has been living in an apartment in [place] with her boyfriend of 2-1/2 years.  They report his family is supportive of her.  She has long-term conflict with her adoptive family.       VITAL SIGNS:  Temperature 98, respiratory rate 16, pulse 66, BP 95/63.      ALLERGIES:  NO KNOWN DRUG ALLERGIES.      MEDICATIONS:  None.      REVIEW OF SYSTEMS:  Nausea and lightheadedness from ingestion of likely Prozac and Seroquel combination.  No significant QTc prolongation.  UA positive for marijuana metabolites.  Normal hemoglobin, still weakly positive hCG.      MENTAL STATUS EXAM:  Trim -American, fully oriented, pleasant, cooperative, soft spoken.  She appeared to be responding well to boyfriend.  No lability.  She is uncomfortable due to nausea but able to maintain interview.  Speech nonpressured.  No dissociation or memory disturbance.  Mood described as frustrated more than depressed.  No panic attack symptoms.  No hallucinations, suicidal ideation, death wish,  "hopelessness or thoughts of harm to others.  Fair insight.  Recent impulsivity of judgment after a conflict.  She agrees to abide by medical recommendations currently.      PSYCHIATRIC DIAGNOSES:  Major depressive disorder, recurrent by history and cannabis use disorder.      IMPRESSION/RECOMMENDATION:  The patient is currently not holdable.  Both she and boyfriend feel that another hospitalization would not be in her best interest, and she states, \"It made me feel worse.\"  She is agreeable to keep open the option to do 1:1 counseling.  I did discuss contacting her provider at [facility] for referral.  She agrees to go back to see her nurse practitioner for general followup.  She wishes to remain off of psychotropic medications.  She does agree to speak with her family.  Boyfriend feels comfortable with plan, and his family is supportive.  Case discussed with Dr. Tsai.  Please call with questions and concerns.      Thank you for consultation.         LINDSAY CEBALLOS MD             D: 12/15/2019   T: 12/15/2019   MT:       Name:     MOODY BERNABE   MRN:      [id number]        Account:       [id number]   :      1995           Consult Date:  12/15/2019      Document: [id number]    "

## 2019-12-15 NOTE — CONSULTS
See dictation. #[id number]  Psychiatry   Initial Consultation  Patient seen, notes reviewed. Spoke with carissa Nobles with whom she lives. Plan discussed with nursing and Dr. Jim. Call prn until release.   Libia Ibrahim MD  12/15/2019

## 2019-12-16 ENCOUNTER — TELEPHONE (OUTPATIENT)
Dept: PEDIATRICS | Facility: CLINIC | Age: 24
End: 2019-12-16

## 2019-12-16 DIAGNOSIS — Z71.89 OTHER SPECIFIED COUNSELING: ICD-10-CM

## 2019-12-16 NOTE — TELEPHONE ENCOUNTER
Please contact patient for In-patient follow up.  [phone] (home)     Visit date: 121519  Diagnosis listed: Suicide Attempt By Multiple Drug Overdose, Initial Encounter  Number of visits in past 12 months: 4 ED / 3 IP

## 2019-12-16 NOTE — TELEPHONE ENCOUNTER
"ED/Discharge Protocol    \"Hi, my name is Ousmane Boogie RN, a registered nurse, and I am calling on behalf of Lora Garcia's office at [place].  I am calling to follow up and see how things are going for you after your recent visit.\"    \"I see that you were in the (ER/UC/IP) on 12/14/19.    How are you doing now that you are home?\" \"I'm fine. Doing better.\"    Is patient experiencing symptoms that may require a hospital visit?  no    Discharge Instructions    \"Let's review your discharge instructions.  What is/are the follow-up recommendations?  Pt. Response: Seek mental health provider.    \"Were you instructed to make a follow-up appointment?\"  Pt. Response: Yes with PCP or mental health provider. Pt states that they have resources and contact numbers. Pt does not plan to schedule with either PCP or MH provider.       \"When you see the provider, I would recommend that you bring your discharge instructions with you.    Medications    \"How many new medications are you on since your hospitalization/ED visit?\"    0-1  \"How many of your current medicines changed (dose, timing, name, etc.) while you were in the hospital/ED visit?\"   0-1  \"Do you have questions about your medications?\"   No  \"Were you newly diagnosed with heart failure, COPD, diabetes or did you have a heart attack?\"   No  For patients on insulin: \"Did you start on insulin in the hospital or did you have your insulin dose changed?\"   No  Post Discharge Medication Reconciliation Status: discharge medications reconciled, continue medications without change.    Was MTM referral placed (*Make sure to put transitions as reason for referral)?   No    Call Summary    \"Do you have any questions or concerns about your condition or care plan at the moment?\"    No  Triage nurse advice given: To consider scheduling with MH provider. Informed that we are available to help as well, offered to schedule upcoming appt with Lora Garcia. Pt " "declined.    Patient was in ER 7 in the past year (assess appropriateness of ER visits.)      \"If you have questions or things don't continue to improve, we encourage you contact us through the main clinic number,  [phone].  Even if the clinic is not open, triage nurses are available 24/7 to help you.     We would like you to know that our clinic has extended hours (provide information).  We also have urgent care (provide details on closest location and hours/contact info)\"      \"Thank you for your time and take care!\"      - Yan \"Ousmane\" FERNANDO Boogie  Triage [facility]    "

## 2019-12-16 NOTE — PROGRESS NOTES
Patient identified as high risk for readmission.  Patient meets criteria for care coordination outreach.    Mariaelena Guerrero RN  Care Coordination  Phone:  [phone]  Email: [email or web address] [place] [facility]

## 2019-12-17 ENCOUNTER — PATIENT OUTREACH (OUTPATIENT)
Dept: CARE COORDINATION | Facility: CLINIC | Age: 24
End: 2019-12-17

## 2019-12-17 LAB — INTERPRETATION ECG - MUSE: NORMAL

## 2019-12-17 NOTE — PROGRESS NOTES
Clinic Care Coordination Contact    Clinic Care Coordination Contact  OUTREACH    Referral Information:  Referral Source: ED Follow-Up    Primary Diagnosis: Behavioral Health    Chief Complaint   Patient presents with     Clinic Care Coordination - Post Hospital     ED/Hosp following SI/Attempt        [place] Utilization:   Clinic Utilization  Difficulty keeping appointments:: No  Compliance Concerns: No  No-Show Concerns: No  No PCP office visit in Past Year: Yes  Utilization    Last refreshed: 12/16/2019  3:45 PM:  Hospital Admissions 1           Last refreshed: 12/16/2019  3:45 PM:  ED Visits 4           Last refreshed: 12/16/2019  3:45 PM:  No Show Count (past year) 1              Current as of: 12/16/2019  3:45 PM              Clinical Concerns:  Patient Active Problem List   Diagnosis     Allergic rhinitis     Other acne     Depression     Suicidal ideations     Suicide attempt by drug ingestion (H)     [facility] outreached to pt on this date following hospitalization:     Admission: Pt was admitted to [facility] following Suicide attempt by medication ingestion.  Date of Discharge:  12/15/2019  Date of Admission: 12/14/2019    Reviewed hospital charting. Pt contracted for safety with [facility].        Medication Management:  Independent.      Psychosocial:  Support from [facility].       Resources and Interventions:  Current Resources:    Pt reports she has resources from hospitalization and options for therapists.  At this time, pt continues to decline assistance for establishing with a therapist and denies any other care coordination needs.  Pt reports she understands care coordination is available as needed.       Patient/Caregiver understanding: Pt reports understanding and denies any additional questions or concerns at this times. ROHAN CC engaged in AIDET communication during encounter.    Plan: No further outreaches will be made at this time unless a new referral is made or a change in the pt's status occurs. Patient was  provided with this writer's contact information and encouraged to call with any questions or concerns.    Navin Munson [facility]  Clinic Care Coordinator  BRITT [facility] DE LA TORRE [facility]  [phone]  [email or web address]

## 2020-03-18 ENCOUNTER — APPOINTMENT (OUTPATIENT)
Dept: ULTRASOUND IMAGING | Facility: CLINIC | Age: 25
End: 2020-03-18
Attending: EMERGENCY MEDICINE
Payer: COMMERCIAL

## 2020-03-18 ENCOUNTER — HOSPITAL ENCOUNTER (EMERGENCY)
Facility: CLINIC | Age: 25
Discharge: HOME | End: 2020-03-18
Attending: EMERGENCY MEDICINE | Admitting: EMERGENCY MEDICINE
Payer: COMMERCIAL

## 2020-03-18 ENCOUNTER — APPOINTMENT (OUTPATIENT)
Dept: CT IMAGING | Facility: CLINIC | Age: 25
End: 2020-03-18
Attending: EMERGENCY MEDICINE
Payer: COMMERCIAL

## 2020-03-18 VITALS
RESPIRATION RATE: 18 BRPM | DIASTOLIC BLOOD PRESSURE: 65 MMHG | OXYGEN SATURATION: 100 % | TEMPERATURE: 97.6 F | HEART RATE: 96 BPM | SYSTOLIC BLOOD PRESSURE: 103 MMHG

## 2020-03-18 DIAGNOSIS — R10.2 SUPRAPUBIC ABDOMINAL PAIN: ICD-10-CM

## 2020-03-18 DIAGNOSIS — R10.31 RLQ ABDOMINAL PAIN: Primary | ICD-10-CM

## 2020-03-18 LAB
ALBUMIN SERPL-MCNC: 4.2 G/DL (ref 3.4–5)
ALBUMIN UR-MCNC: 100 MG/DL
ALP SERPL-CCNC: 81 U/L (ref 40–150)
ALT SERPL W P-5'-P-CCNC: 19 U/L (ref 0–50)
ANION GAP SERPL CALCULATED.3IONS-SCNC: 9 MMOL/L (ref 3–14)
APPEARANCE UR: CLEAR
AST SERPL W P-5'-P-CCNC: 20 U/L (ref 0–45)
BASOPHILS # BLD AUTO: 0 10E9/L (ref 0–0.2)
BASOPHILS NFR BLD AUTO: 0.2 %
BILIRUB SERPL-MCNC: 0.4 MG/DL (ref 0.2–1.3)
BILIRUB UR QL STRIP: NEGATIVE
BUN SERPL-MCNC: 8 MG/DL (ref 7–30)
CALCIUM SERPL-MCNC: 9.3 MG/DL (ref 8.5–10.1)
CHLORIDE SERPL-SCNC: 107 MMOL/L (ref 94–109)
CO2 SERPL-SCNC: 21 MMOL/L (ref 20–32)
COLOR UR AUTO: YELLOW
CREAT SERPL-MCNC: 0.56 MG/DL (ref 0.52–1.04)
DIFFERENTIAL METHOD BLD: ABNORMAL
EOSINOPHIL # BLD AUTO: 0 10E9/L (ref 0–0.7)
EOSINOPHIL NFR BLD AUTO: 0 %
ERYTHROCYTE [DISTWIDTH] IN BLOOD BY AUTOMATED COUNT: 12.3 % (ref 10–15)
GFR SERPL CREATININE-BSD FRML MDRD: >90 ML/MIN/{1.73_M2}
GLUCOSE SERPL-MCNC: 188 MG/DL (ref 70–99)
GLUCOSE UR STRIP-MCNC: NEGATIVE MG/DL
HCG UR QL: POSITIVE
HCT VFR BLD AUTO: 40.2 % (ref 35–47)
HGB BLD-MCNC: 13.5 G/DL (ref 11.7–15.7)
HGB UR QL STRIP: ABNORMAL
IMM GRANULOCYTES # BLD: 0.1 10E9/L (ref 0–0.4)
IMM GRANULOCYTES NFR BLD: 0.4 %
KETONES UR STRIP-MCNC: >150 MG/DL
LEUKOCYTE ESTERASE UR QL STRIP: NEGATIVE
LIPASE SERPL-CCNC: 68 U/L (ref 73–393)
LYMPHOCYTES # BLD AUTO: 0.7 10E9/L (ref 0.8–5.3)
LYMPHOCYTES NFR BLD AUTO: 3.8 %
MCH RBC QN AUTO: 30.5 PG (ref 26.5–33)
MCHC RBC AUTO-ENTMCNC: 33.6 G/DL (ref 31.5–36.5)
MCV RBC AUTO: 91 FL (ref 78–100)
MONOCYTES # BLD AUTO: 0.6 10E9/L (ref 0–1.3)
MONOCYTES NFR BLD AUTO: 3.3 %
MUCOUS THREADS #/AREA URNS LPF: PRESENT /LPF
NEUTROPHILS # BLD AUTO: 16.4 10E9/L (ref 1.6–8.3)
NEUTROPHILS NFR BLD AUTO: 92.3 %
NITRATE UR QL: NEGATIVE
NRBC # BLD AUTO: 0 10*3/UL
NRBC BLD AUTO-RTO: 0 /100
PH UR STRIP: 6.5 PH (ref 5–7)
PLATELET # BLD AUTO: 322 10E9/L (ref 150–450)
POTASSIUM SERPL-SCNC: 3.7 MMOL/L (ref 3.4–5.3)
PROT SERPL-MCNC: 8.4 G/DL (ref 6.8–8.8)
RBC # BLD AUTO: 4.42 10E12/L (ref 3.8–5.2)
RBC #/AREA URNS AUTO: 6 /HPF (ref 0–2)
SODIUM SERPL-SCNC: 137 MMOL/L (ref 133–144)
SOURCE: ABNORMAL
SP GR UR STRIP: 1.03 (ref 1–1.03)
SQUAMOUS #/AREA URNS AUTO: 14 /HPF (ref 0–1)
UROBILINOGEN UR STRIP-MCNC: NORMAL MG/DL (ref 0–2)
WBC # BLD AUTO: 17.8 10E9/L (ref 4–11)
WBC #/AREA URNS AUTO: 3 /HPF (ref 0–5)

## 2020-03-18 PROCEDURE — 96375 TX/PRO/DX INJ NEW DRUG ADDON: CPT

## 2020-03-18 PROCEDURE — 84702 CHORIONIC GONADOTROPIN TEST: CPT | Performed by: EMERGENCY MEDICINE

## 2020-03-18 PROCEDURE — 25800030 ZZH RX IP 258 OP 636: Performed by: EMERGENCY MEDICINE

## 2020-03-18 PROCEDURE — 81001 URINALYSIS AUTO W/SCOPE: CPT | Performed by: EMERGENCY MEDICINE

## 2020-03-18 PROCEDURE — 81025 URINE PREGNANCY TEST: CPT | Performed by: EMERGENCY MEDICINE

## 2020-03-18 PROCEDURE — 25000128 H RX IP 250 OP 636: Performed by: EMERGENCY MEDICINE

## 2020-03-18 PROCEDURE — 80053 COMPREHEN METABOLIC PANEL: CPT | Performed by: EMERGENCY MEDICINE

## 2020-03-18 PROCEDURE — 85025 COMPLETE CBC W/AUTO DIFF WBC: CPT | Performed by: EMERGENCY MEDICINE

## 2020-03-18 PROCEDURE — 25000125 ZZHC RX 250: Performed by: EMERGENCY MEDICINE

## 2020-03-18 PROCEDURE — 83690 ASSAY OF LIPASE: CPT | Performed by: EMERGENCY MEDICINE

## 2020-03-18 PROCEDURE — 96376 TX/PRO/DX INJ SAME DRUG ADON: CPT

## 2020-03-18 PROCEDURE — 76801 OB US < 14 WKS SINGLE FETUS: CPT

## 2020-03-18 PROCEDURE — 96374 THER/PROPH/DIAG INJ IV PUSH: CPT | Mod: 59

## 2020-03-18 PROCEDURE — 99285 EMERGENCY DEPT VISIT HI MDM: CPT | Mod: 25

## 2020-03-18 PROCEDURE — 96361 HYDRATE IV INFUSION ADD-ON: CPT

## 2020-03-18 PROCEDURE — 74177 CT ABD & PELVIS W/CONTRAST: CPT

## 2020-03-18 RX ORDER — ONDANSETRON 4 MG/1
4 TABLET, ORALLY DISINTEGRATING ORAL EVERY 8 HOURS PRN
Qty: 10 TABLET | Refills: 0 | Status: SHIPPED | OUTPATIENT
Start: 2020-03-18 | End: 2020-11-20

## 2020-03-18 RX ORDER — ONDANSETRON 2 MG/ML
4 INJECTION INTRAMUSCULAR; INTRAVENOUS ONCE
Status: COMPLETED | OUTPATIENT
Start: 2020-03-18 | End: 2020-03-18

## 2020-03-18 RX ORDER — HYDROMORPHONE HYDROCHLORIDE 1 MG/ML
0.5 INJECTION, SOLUTION INTRAMUSCULAR; INTRAVENOUS; SUBCUTANEOUS
Status: DISCONTINUED | OUTPATIENT
Start: 2020-03-18 | End: 2020-03-18 | Stop reason: HOSPADM

## 2020-03-18 RX ORDER — IOPAMIDOL 755 MG/ML
500 INJECTION, SOLUTION INTRAVASCULAR ONCE
Status: COMPLETED | OUTPATIENT
Start: 2020-03-18 | End: 2020-03-18

## 2020-03-18 RX ADMIN — HYDROMORPHONE HYDROCHLORIDE 0.5 MG: 1 INJECTION, SOLUTION INTRAMUSCULAR; INTRAVENOUS; SUBCUTANEOUS at 17:09

## 2020-03-18 RX ADMIN — IOPAMIDOL 44 ML: 755 INJECTION, SOLUTION INTRAVENOUS at 17:34

## 2020-03-18 RX ADMIN — ONDANSETRON 4 MG: 2 INJECTION INTRAMUSCULAR; INTRAVENOUS at 14:59

## 2020-03-18 RX ADMIN — SODIUM CHLORIDE 1000 ML: 9 INJECTION, SOLUTION INTRAVENOUS at 14:59

## 2020-03-18 RX ADMIN — SODIUM CHLORIDE 54 ML: 9 INJECTION, SOLUTION INTRAVENOUS at 17:34

## 2020-03-18 RX ADMIN — ONDANSETRON 4 MG: 2 INJECTION INTRAMUSCULAR; INTRAVENOUS at 17:09

## 2020-03-18 ASSESSMENT — ENCOUNTER SYMPTOMS
FEVER: 0
DYSURIA: 0
ABDOMINAL PAIN: 1
VOMITING: 1
NAUSEA: 1

## 2020-03-18 NOTE — ED NOTES
Lab called regarding missing lab results that was sent quite some time ago. They found labs and will start running them.

## 2020-03-18 NOTE — ED AVS SNAPSHOT
[facility]  [address]  [facility] [address]  Phone:  [phone]  Fax:  [phone]                                    Qian Loo   MRN: [id number]    Department:  [facility]   Date of Visit:  3/18/2020           After Visit Summary Signature Page    I have received my discharge instructions, and my questions have been answered. I have discussed any challenges I see with this plan with the nurse or doctor.    ..........................................................................................................................................  Patient/Patient Representative Signature      ..........................................................................................................................................  Patient Representative Print Name and Relationship to Patient    ..................................................               ................................................  Date                                   Time    ..........................................................................................................................................  Reviewed by Signature/Title    ...................................................              ..............................................  Date                                               Time          22EPIC Rev 08/18

## 2020-03-18 NOTE — ED TRIAGE NOTES
"Pt presents with c/o's nausea and vomiting X2 days. Pt states she \"might be pregnant\" as she's 13 days late. Pt also has RLQ abd pain. Pt is A&O, ABC's intact.   "

## 2020-03-18 NOTE — ED PROVIDER NOTES
History     Chief Complaint:    Vomiting    The history is provided by the patient.      Qian Loo is a 24 year old female (G2,P0,A2) who presents with nausea and vomiting. The patient states that her symptoms began 2 days ago and she is concerned that she is pregnant. The patient's menstrual period is 12-13 days late and she experienced hyperemesis in her previous pregnancies as well. She also endorses some slight RLQ abdominal pain. The patient denies any fevers, dysuria, or vaginal bleeding. The patient has not taken any Tylenol for her symptoms. Of note: she is unsure how far along she was when she miscarried previously and the patient's elective  was 4 months ago.    Allergies:  No Known Drug Allergies  Seasonal Allergies     Medications:    Nexplanon     Past Medical History:    Anxiety  Hyperemesis  Depression   Allergic rhinitis  Acne  Suicidal ideations  Suicide attempt by drug ingestion   Bulimia    Past Surgical History:    D&C    Family History:    No past pertinent family history.    Social History:  Negative for tobacco use.  Positive for alcohol use - 2-3x/month, 1/2 each time.  Positive for drug use - marijuana.  Marital Status:  Single [1]     Review of Systems   Constitutional: Negative for fever.   Gastrointestinal: Positive for abdominal pain, nausea and vomiting.   Genitourinary: Negative for dysuria and vaginal bleeding.   All other systems reviewed and are negative.    Physical Exam     Patient Vitals for the past 24 hrs:   BP Temp Temp src Pulse Resp SpO2   20 1900 99/68 -- -- 78 -- 100 %   20 1845 98/64 -- -- 78 -- 99 %   20 1715 104/71 -- -- 77 -- 99 %   20 1700 -- -- -- 79 -- 100 %   20 1645 103/77 -- -- 80 -- 100 %   20 1630 105/86 -- -- 87 -- 92 %   20 1615 -- -- -- 83 -- 100 %   20 1515 -- -- -- -- -- 98 %   20 1500 91/68 -- -- 95 -- 98 %   20 1430 129/77 -- -- -- -- 100 %   20 1428 129/77 97.6  F (36.4   C) Oral 90 18 100 %     Physical Exam  General: Alert, appears well-developed and well-nourished. Cooperative.     In mild distress  HEENT:  Head:  Atraumatic  Ears:  External ears are normal  Mouth/Throat:  Oropharynx is without erythema or exudate and mucous membranes are dry.   Eyes:   Conjunctivae normal and EOM are normal. No scleral icterus.  CV:  Normal rate, regular rhythm, normal heart sounds and radial and dorsalis pedis pulses are 2+ and symmetric.  No murmur.  Resp:  Breath sounds are clear bilaterally    Non-labored, no retractions or accessory muscle use  GI:  Abdomen is soft, no distension, suprapubic midline tenderness. No rebound or guarding.  MS:  Normal range of motion. No edema.    Normal strength in all 4 extremities.     Back atraumatic.    No midline cervical, thoracic, or lumbar tenderness  Skin:  Warm and dry.  No rash or lesions noted.  Neuro: Alert. Normal strength.   GCS: 15  Psych:  Normal mood and affect.    Emergency Department Course     Imaging:  Radiology findings were communicated with the patient who voiced understanding of the findings.    US OB <14 Weeks W Transvaginal:  1. Small amount of anechoic collection within the endometrial complex,  indeterminate, could represent an early gestational sac versus a small amount of endometrial fluid. No yolk sac or fetal pole visualized. If this would represent a gestational sac, the mean sac diameter measures 6 mm, corresponds to 5 weeks and 1 day gestation.   2. Ovaries are unremarkable. No adnexal masses, as per radiology.     CT Abdomen Pelvis w/ IV contrast:   1.  Recently involuted left adnexal follicle or cyst with small amount of free fluid. Nonspecific heterogeneous endometrial contents lower uterine segment, as per radiology.    Laboratory:  Laboratory findings were communicated with the patient who voiced understanding of the findings.    CMP: Glucose 88 H o/w WNL (Creatinine 0.56)  Lipase: 68 L  CBC: WBC 17.8 H o/w WNL (HGB  13.5, )    UA: clear, yellow urine, ketones >150, blood trace, protein albumin 100, RBC 6 H, squamous epithelial 14 H, mucous present o/w negative  HCG qualitative urine (UPT): Positive  HCG quantitative pregnancy: <1    Interventions:  1459: Zofran 4 mg IV  1459: 0.9% sodium chloride BOLUS 1 L IV  1709: Dilaudid 0.5 mg IV  1709: Zofran 4 mg IV    Emergency Department Course:  Past medical records, nursing notes, and vitals reviewed.    1428: I performed an exam of the patient as documented above.     IV was inserted and blood was drawn for laboratory testing, results above.    The patient was sent for an ob <14 weeks transvaginal and an abdomen pelvis CT while in the emergency department, results above.     1550: I rechecked the patient and discussed the results of her workup thus far.     1657: Patient rechecked and updated.    1801: Patient rechecked and updated.    1815: I spoke with Dr. Chandler from [facility] regarding the patient.    1834: I spoke with Dr. Chandler from [facility] again.    1915: Patient rechecked and updated.    I personally reviewed the laboratory and imaging results with the Patient and answered all related questions prior to discharge.     Findings and plan explained to the Patient. Patient discharged home with instructions regarding supportive care, medications, and reasons to return. The importance of close follow-up was reviewed.     Impression & Plan     Medical Decision Making:  Patient is a 24 year old female who presents with suprapubic and right lower quadrant abdominal pain. Patient's urinalysis shows no evidence of infection.  UPT positive. She does appear quite dehydrated and also appears with nausea and vomiting. Blood work significant for significant leukocytosis above 17.8. Patient has not been having fevers and otherwise is having normal vital signs here. Patient is pregnant based on urinalysis. Initial ultrasound obtained showing a small amount of anechoic collection within  the endometrial complex that may represent an early gestational sac versus a small amount of endometrial fluid. If this were to represent a sac, it would measure at 6 mm and would represent dates of 5 weeks and 1 day. The ovaries were unremarkable on pelvic ultrasound. Patient continued to have an abnormal amount of right lower quadrant abdominal pain and due to the leukocytosis I had concern for acute appendicitis even in the setting of a first trimester pregnancy. Patient states that she is not interested in continuing with this pregnancy and will seek an elective . After discussion with her, she understands the risks and potential complications with obtaining CT imaging during first trimester pregnancy and elects to proceed with this. We did offer MRI imaging versus watch and waiting. CT obtained and reassuringly no evidence of acute appendicitis.     On recheck, patient is feeling increasingly improved and CT had also mentioned a possible left adnexal follicle or cyst with a small amount for free fluid.  I added on a beta hCG quantitative from blood to her lab work and this returned as less than 1.  In discussion with lab to have them double check both UPT and blood work patient does not appear to be pregnant.  Certain antibodies in her urine that caused the UPT assay to be positive inappropriately.  Also positive on her UPT.  Ultimately I discussed the case with Dr. Chandler of obgyn and on review of imaging as well as blood work prior to the clear understanding that the patient is not pregnant, lower concern that this would represent ectopic pregnancy, particularly with normal adnexa bilaterally on her pelvic ultrasound. Certainly patient warrants close outpatient follow up with obgyn for repeat evaluation as to better determination of the thickening of the endometrium which likely is consistent with her missed period.  They would like for her to call clinic tomorrow for an appointment. Patient feels  improved on my reevaluation. She will be prescribed Zofran for nausea. All questions answered prior to discharge. She understands return precautions for worsening pain, vomiting, or inability to retain oral intake.    Diagnosis:    ICD-10-CM    1. RLQ abdominal pain  R10.31 HCG quantitative pregnancy   2. Suprapubic abdominal pain  R10.2      Disposition:  Discharged to home.    Discharge Medications:  New Prescriptions    ONDANSETRON (ZOFRAN ODT) 4 MG ODT TAB    Take 1 tablet (4 mg) by mouth every 8 hours as needed for nausea     Scribe Disclosure:  Elana MARTINEZ, am serving as a scribe at 2:24 PM on 3/18/2020 to document services personally performed by Robbie Bonds MD based on my observations and the provider's statements to me.     Elana Osorio  3/18/2020   [facility] EMERGENCY DEPARTMENT       Robbie Bonds MD  03/18/20 [id number]

## 2020-03-26 LAB — B-HCG SERPL-ACNC: ABNORMAL IU/L (ref 0–5)

## 2020-10-28 ENCOUNTER — HOSPITAL ENCOUNTER (EMERGENCY)
Facility: CLINIC | Age: 25
Discharge: HOME | End: 2020-10-28
Attending: EMERGENCY MEDICINE | Admitting: EMERGENCY MEDICINE
Payer: COMMERCIAL

## 2020-10-28 ENCOUNTER — APPOINTMENT (OUTPATIENT)
Dept: ULTRASOUND IMAGING | Facility: CLINIC | Age: 25
End: 2020-10-28
Attending: EMERGENCY MEDICINE
Payer: COMMERCIAL

## 2020-10-28 VITALS
DIASTOLIC BLOOD PRESSURE: 67 MMHG | OXYGEN SATURATION: 98 % | RESPIRATION RATE: 16 BRPM | HEART RATE: 67 BPM | TEMPERATURE: 97.5 F | SYSTOLIC BLOOD PRESSURE: 102 MMHG

## 2020-10-28 DIAGNOSIS — O21.0 MORNING SICKNESS: ICD-10-CM

## 2020-10-28 DIAGNOSIS — Z34.90 EARLY STAGE OF PREGNANCY: ICD-10-CM

## 2020-10-28 LAB
ALBUMIN SERPL-MCNC: 4.2 G/DL (ref 3.4–5)
ALBUMIN UR-MCNC: 200 MG/DL
ALP SERPL-CCNC: 89 U/L (ref 40–150)
ALT SERPL W P-5'-P-CCNC: 38 U/L (ref 0–50)
AMORPH CRY #/AREA URNS HPF: ABNORMAL /HPF
ANION GAP SERPL CALCULATED.3IONS-SCNC: 12 MMOL/L (ref 3–14)
APPEARANCE UR: CLEAR
AST SERPL W P-5'-P-CCNC: 27 U/L (ref 0–45)
B-HCG SERPL-ACNC: ABNORMAL IU/L (ref 0–5)
BACTERIA #/AREA URNS HPF: ABNORMAL /HPF
BASOPHILS # BLD AUTO: 0 10E9/L (ref 0–0.2)
BASOPHILS NFR BLD AUTO: 0.1 %
BILIRUB SERPL-MCNC: 0.4 MG/DL (ref 0.2–1.3)
BILIRUB UR QL STRIP: NEGATIVE
BUN SERPL-MCNC: 12 MG/DL (ref 7–30)
CALCIUM SERPL-MCNC: 9.1 MG/DL (ref 8.5–10.1)
CHLORIDE SERPL-SCNC: 102 MMOL/L (ref 94–109)
CO2 SERPL-SCNC: 20 MMOL/L (ref 20–32)
COLOR UR AUTO: YELLOW
CREAT SERPL-MCNC: 0.7 MG/DL (ref 0.52–1.04)
DIFFERENTIAL METHOD BLD: ABNORMAL
EOSINOPHIL # BLD AUTO: 0 10E9/L (ref 0–0.7)
EOSINOPHIL NFR BLD AUTO: 0 %
ERYTHROCYTE [DISTWIDTH] IN BLOOD BY AUTOMATED COUNT: 12.6 % (ref 10–15)
GFR SERPL CREATININE-BSD FRML MDRD: >90 ML/MIN/{1.73_M2}
GLUCOSE SERPL-MCNC: 182 MG/DL (ref 70–99)
GLUCOSE UR STRIP-MCNC: 150 MG/DL
HCG UR QL: POSITIVE
HCT VFR BLD AUTO: 41.5 % (ref 35–47)
HGB BLD-MCNC: 14 G/DL (ref 11.7–15.7)
HGB UR QL STRIP: NEGATIVE
IMM GRANULOCYTES # BLD: 0.1 10E9/L (ref 0–0.4)
IMM GRANULOCYTES NFR BLD: 0.3 %
KETONES UR STRIP-MCNC: >150 MG/DL
LEUKOCYTE ESTERASE UR QL STRIP: NEGATIVE
LYMPHOCYTES # BLD AUTO: 0.9 10E9/L (ref 0.8–5.3)
LYMPHOCYTES NFR BLD AUTO: 6 %
MCH RBC QN AUTO: 31.4 PG (ref 26.5–33)
MCHC RBC AUTO-ENTMCNC: 33.7 G/DL (ref 31.5–36.5)
MCV RBC AUTO: 93 FL (ref 78–100)
MONOCYTES # BLD AUTO: 0.6 10E9/L (ref 0–1.3)
MONOCYTES NFR BLD AUTO: 4.1 %
MUCOUS THREADS #/AREA URNS LPF: PRESENT /LPF
NEUTROPHILS # BLD AUTO: 13 10E9/L (ref 1.6–8.3)
NEUTROPHILS NFR BLD AUTO: 89.5 %
NITRATE UR QL: NEGATIVE
NRBC # BLD AUTO: 0 10*3/UL
NRBC BLD AUTO-RTO: 0 /100
PH UR STRIP: 6 PH (ref 5–7)
PLATELET # BLD AUTO: 333 10E9/L (ref 150–450)
POTASSIUM SERPL-SCNC: 3.9 MMOL/L (ref 3.4–5.3)
PROT SERPL-MCNC: 8.8 G/DL (ref 6.8–8.8)
RBC # BLD AUTO: 4.46 10E12/L (ref 3.8–5.2)
RBC #/AREA URNS AUTO: 1 /HPF (ref 0–2)
SODIUM SERPL-SCNC: 134 MMOL/L (ref 133–144)
SOURCE: ABNORMAL
SP GR UR STRIP: 1.03 (ref 1–1.03)
SQUAMOUS #/AREA URNS AUTO: 8 /HPF (ref 0–1)
UROBILINOGEN UR STRIP-MCNC: NORMAL MG/DL (ref 0–2)
WBC # BLD AUTO: 14.6 10E9/L (ref 4–11)
WBC #/AREA URNS AUTO: 2 /HPF (ref 0–5)

## 2020-10-28 PROCEDURE — 96360 HYDRATION IV INFUSION INIT: CPT

## 2020-10-28 PROCEDURE — 81001 URINALYSIS AUTO W/SCOPE: CPT | Performed by: EMERGENCY MEDICINE

## 2020-10-28 PROCEDURE — 84702 CHORIONIC GONADOTROPIN TEST: CPT | Performed by: EMERGENCY MEDICINE

## 2020-10-28 PROCEDURE — 85025 COMPLETE CBC W/AUTO DIFF WBC: CPT | Performed by: EMERGENCY MEDICINE

## 2020-10-28 PROCEDURE — 99285 EMERGENCY DEPT VISIT HI MDM: CPT | Mod: 25

## 2020-10-28 PROCEDURE — 81025 URINE PREGNANCY TEST: CPT | Performed by: EMERGENCY MEDICINE

## 2020-10-28 PROCEDURE — 258N000003 HC RX IP 258 OP 636: Performed by: EMERGENCY MEDICINE

## 2020-10-28 PROCEDURE — 76817 TRANSVAGINAL US OBSTETRIC: CPT

## 2020-10-28 PROCEDURE — 80053 COMPREHEN METABOLIC PANEL: CPT | Performed by: EMERGENCY MEDICINE

## 2020-10-28 PROCEDURE — 250N000013 HC RX MED GY IP 250 OP 250 PS 637: Performed by: EMERGENCY MEDICINE

## 2020-10-28 PROCEDURE — 250N000011 HC RX IP 250 OP 636: Performed by: EMERGENCY MEDICINE

## 2020-10-28 RX ORDER — ONDANSETRON 4 MG/1
4 TABLET, ORALLY DISINTEGRATING ORAL ONCE
Status: COMPLETED | OUTPATIENT
Start: 2020-10-28 | End: 2020-10-28

## 2020-10-28 RX ORDER — PYRIDOXINE HCL (VITAMIN B6) 25 MG
TABLET ORAL
Qty: 30 TABLET | Refills: 1 | Status: SHIPPED | OUTPATIENT
Start: 2020-10-28

## 2020-10-28 RX ORDER — METOCLOPRAMIDE 10 MG/1
10 TABLET ORAL 3 TIMES DAILY PRN
Qty: 20 TABLET | Refills: 0 | Status: SHIPPED | OUTPATIENT
Start: 2020-10-28 | End: 2020-11-20

## 2020-10-28 RX ORDER — PNV NO.95/FERROUS FUM/FOLIC AC 28MG-0.8MG
1 TABLET ORAL DAILY
Qty: 90 TABLET | Refills: 3 | Status: SHIPPED | OUTPATIENT
Start: 2020-10-28

## 2020-10-28 RX ORDER — IBUPROFEN 200 MG
400 TABLET ORAL ONCE
Status: COMPLETED | OUTPATIENT
Start: 2020-10-28 | End: 2020-10-28

## 2020-10-28 RX ADMIN — SODIUM CHLORIDE, POTASSIUM CHLORIDE, SODIUM LACTATE AND CALCIUM CHLORIDE 1000 ML: 600; 310; 30; 20 INJECTION, SOLUTION INTRAVENOUS at 13:23

## 2020-10-28 RX ADMIN — IBUPROFEN 400 MG: 200 TABLET, FILM COATED ORAL at 12:49

## 2020-10-28 RX ADMIN — ONDANSETRON 4 MG: 4 TABLET, ORALLY DISINTEGRATING ORAL at 12:42

## 2020-10-28 ASSESSMENT — ENCOUNTER SYMPTOMS
FEVER: 0
SORE THROAT: 0
SHORTNESS OF BREATH: 0
DIARRHEA: 0
DYSURIA: 0
CHILLS: 0
FLANK PAIN: 0
HEADACHES: 0
NAUSEA: 1
DIZZINESS: 0
CONFUSION: 0
COUGH: 0
APPETITE CHANGE: 1
VOMITING: 1
ABDOMINAL PAIN: 1
DIFFICULTY URINATING: 0
ACTIVITY CHANGE: 0

## 2020-10-28 NOTE — ED AVS SNAPSHOT
[facility] Emergency Dept  [address]  [facility] [address]  Phone: [phone]  Fax: [phone]                                    Qian Loo   MRN: [id number]    Department: [facility] Emergency Dept   Date of Visit: 10/28/2020           After Visit Summary Signature Page    I have received my discharge instructions, and my questions have been answered. I have discussed any challenges I see with this plan with the nurse or doctor.    ..........................................................................................................................................  Patient/Patient Representative Signature      ..........................................................................................................................................  Patient Representative Print Name and Relationship to Patient    ..................................................               ................................................  Date                                   Time    ..........................................................................................................................................  Reviewed by Signature/Title    ...................................................              ..............................................  Date                                               Time          22EPIC Rev 08/18

## 2020-10-28 NOTE — ED PROVIDER NOTES
History   Chief Complaint:  Nausea    HPI   Qian Loo is a 25 year old female, , who presents for evaluation of 2 days of nausea and vomiting.  She reports that she has been pregnant in the past and she feels that this is similar to the nausea and vomiting that she had with pregnancy.  She reports that her last menstrual period was around the 12th of last month and she has not yet gotten her period this month although it is not unusual for her periods to be irregular.  She denies any current pelvic pain or vaginal bleeding but she notes moderate diffuse lower abdominal cramping which she states she sometimes gets before her periods.  She denies any dysuria, urinary frequency urgency.  She has not taken a pregnancy test at home.  She denies any fever, chest pain, shortness of breath, diarrhea.  No cough.  She denies any other symptoms or concerns.  She reports she has not taken any medications prior to arrival.    Allergies:  No Known Drug Allergies     Medications:   The patient is not currently taking any prescribed medications.     Past Medical History:    Anxiety   Depression  Suicidal ideation   Suicide attempt by drug ingestion   Bulimia      Past Surgical History:    History reviewed. No pertinent past surgical history.     Family History:    Unknown/Adopted     Social History:  The patient was unaccompanied to the ED.  Smoking Status: Never Smoker  Smokeless Tobacco: Never Used  Alcohol Use: Not Currently  Drug Use: Yes - Marijuana   PCP: Lora Campbell   Marital Status: Single     Review of Systems   Constitutional: Positive for appetite change. Negative for activity change, chills and fever.   HENT: Negative for congestion and sore throat.    Respiratory: Negative for cough and shortness of breath.    Cardiovascular: Negative for chest pain.   Gastrointestinal: Positive for abdominal pain, nausea and vomiting. Negative for diarrhea.   Genitourinary: Negative for difficulty  urinating, dysuria, flank pain, vaginal bleeding and vaginal discharge.   Skin: Negative for rash.   Neurological: Negative for dizziness, syncope and headaches.   Psychiatric/Behavioral: Negative for confusion.   All other systems reviewed and are negative.      Physical Exam     Patient Vitals for the past 24 hrs:   BP Temp Pulse Resp SpO2   10/28/20 1345 -- -- -- -- 100 %   10/28/20 1340 94/50 -- 71 -- --   10/28/20 1229 122/73 -- -- -- --   10/28/20 1226 -- 97.5  F (36.4  C) 62 20 97 %     Physical Exam  General: Well appearing, nontoxic. Resting comfortably  Head:  Scalp, face, and head appear normal  Eyes:  Pupils are equal, round    Conjunctivae non-injected and sclerae white  ENT:    The external nose is normal    Pinnae are normal  Neck:  Normal range of motion    There is no rigidity noted    Trachea is in the midline  CV:  Regular rate and rhythm     Normal S1/S2, no S3/S4    No murmur or rub. Radial pulses 2+ bilaterally.  Resp:  Lungs are clear and equal bilaterally  There is no tachypnea    No increased work of breathing    No rales, wheezing, or rhonchi  GI:  Abdomen is soft, no rigidity or guarding    No distension, or mass    Mild lower tenderness. No rebound tenderness. No focal RLQ tenderness to palpation.  No CVA tenderness   MS:  Normal muscular tone    Symmetric motor strength    No lower extremity edema  Skin:  No rash or acute skin lesions noted  Neuro: Awake and alert  Speech is normal and fluent  Moves all extremities spontaneously  Psych:  Normal affect. Appropriate interactions.      Emergency Department Course   Imaging:  Radiology findings were communicated with the patient who voiced understanding of the findings.    US OB <14 Weeks W Transvaginal  IMPRESSION: Probable small intrauterine gestational sac measures 1.2 cm, corresponding to an estimated gestational age of 6 weeks. No embryo is identified, possibly due to the early gestational age. Please clinically correlate. Follow-up  ultrasound may be helpful to confirm a viable intrauterine pregnancy.  Reading per radiology.      Laboratory:  Laboratory findings were communicated with the patient who voiced understanding of the findings.    CBC: WBC 14.6 (H) o/w WNL (HGB 14.0, )  CMP: Glucose 182 (H) o/w WNL (Creatinine 0.70)   HCG Quantitative: 14569 (H)     UA reflex to Microscopic and Culture: Glucose 150 (A), Ketones >150 (A), Protein albumin 200 (A), Bacteria Few (A), Squamous epithelial/HPF 8 (H), Mucous Present (A), Amorphous crystals Few (A) o/w WNL   HCG Qualitative Urine: Positive (A)     Interventions:  1242 Zofran 4 mg PO  1249 Advil 400 mg PO  1323 Lactated ringers bolus 1000 mL IV    Emergency Department Course:  Past medical records, nursing notes, and vitals reviewed.  The patient was sent for an US OB <14 Weeks W Transvaginal while in the emergency department, results above.    IV was inserted and blood was drawn for laboratory testing, results above.   The patient provided a urine sample here in the emergency department. This was sent for laboratory testing, findings above.     (1238)   I performed an exam of the patient as documented above. History obtained from patient.     (1422)   I rechecked the patient and discussed results and plan of care.     Findings and plan explained to the Patient. Patient discharged home with instructions regarding supportive care, medications, and reasons to return. The importance of close follow-up was reviewed. The patient was prescribed Unisom, Reglan, Vitamin B6, and Prenatal vitamin. I personally reviewed the laboratory and imaging results with the Patient and answered all related questions prior to discharge.     Impression & Plan   Covid-19  Qian Loo was evaluated during a global COVID-19 pandemic, which necessitated consideration that the patient might be at risk for infection with the SARS-CoV-2 virus that causes COVID-19.   Applicable protocols for evaluation were  followed during the patient's care.      Medical Decision Making:  Qian Loo is a 25 year old  (now G4) female who presents for evaluation of nausea and vomiting which she feels is similar to when she was previously pregnant.  She has not taken a home pregnancy test.  On my evaluation she is well-appearing, hemodynamically stable and afebrile.  Abdominal exam is benign without evidence of peritonitis or acute surgical emergency.  Broad differential diagnosis is considered.  Pregnancy test in the emergency department is positive.  Therefore given the presence of lower abdominal discomfort laboratory evaluation and pelvic ultrasound was obtained.  Pelvic ultrasound reveals evidence of intrauterine pregnancy with gestational sac and yolk sac normally located within the uterus.  No evidence of ectopic pregnancy.  Labs are reassuring.  There is a mild nonspecific leukocytosis likely from vomiting.  Urinalysis reveals positive ketones consistent with her history of frequent nausea and vomiting.  No other concerning findings were discovered at this time.  I recommended supportive care at home with good oral fluid intake, doxylamine, pyridoxine and Reglan for nausea and vomiting.  I stressed the importance of close follow-up with OB/GYN and recommended starting prenatal vitamin.  The patient felt improved after the above interventions.  Close return precautions were provided and the patient was discharged in stable condition.    Diagnosis:    ICD-10-CM    1. Early stage of pregnancy  Z34.90    2. Morning sickness  O21.0      Disposition:  Discharged to home.    Discharge Medications:  New Prescriptions    DOXYLAMINE (UNISOM) 25 MG TABS TABLET    Take 1 tablet by mouth at bedtime as needed for nausea/vomiting of pregnancy. If not improved take an additional 1/2 tablet in the morning and 1/2 tablet at lunch.    METOCLOPRAMIDE (REGLAN) 10 MG TABLET    Take 1 tablet (10 mg) by mouth 3 times daily as needed  (Nausea or Vomiting)    PRENATAL VIT-FE FUMARATE-FA (PRENATAL VITAMIN) 27-0.8 MG TABS    Take 1 tablet by mouth daily    PYRIDOXINE (VITAMIN B6) 25 MG TABLET    Take 1 tablet by mouth at bedtime as needed for nausea/vomiting of pregnancy. If not improved take an additional 1/2 tablet in the morning and 1/2 tablet at lunch.       Scribe Disclosure:  I, Eric Arreguin, am serving as a scribe at 12:32 PM on 10/28/2020 to document services personally performed by Connor Weinstein MD based on my observations and the provider's statements to me.  October 28, 2020   [facility] EMERGENCY DEPT        Connor Weinstein MD  10/30/20 0952

## 2020-10-28 NOTE — ED TRIAGE NOTES
Pt presents to ED with c/o nausea. Concerned for pregnancy. Nauseated for a few days, getting worse. Has not taken pregnancy test at home. Pt reports diarrhea as well.

## 2020-10-29 ENCOUNTER — HOSPITAL ENCOUNTER (EMERGENCY)
Facility: CLINIC | Age: 25
Discharge: HOME | End: 2020-10-29
Attending: EMERGENCY MEDICINE | Admitting: EMERGENCY MEDICINE
Payer: COMMERCIAL

## 2020-10-29 VITALS
HEART RATE: 70 BPM | TEMPERATURE: 98.1 F | OXYGEN SATURATION: 100 % | SYSTOLIC BLOOD PRESSURE: 99 MMHG | RESPIRATION RATE: 16 BRPM | DIASTOLIC BLOOD PRESSURE: 72 MMHG

## 2020-10-29 DIAGNOSIS — F12.90 CANNABINOID HYPEREMESIS SYNDROME: ICD-10-CM

## 2020-10-29 DIAGNOSIS — R11.2 NON-INTRACTABLE VOMITING WITH NAUSEA, UNSPECIFIED VOMITING TYPE: ICD-10-CM

## 2020-10-29 DIAGNOSIS — R11.2 CANNABINOID HYPEREMESIS SYNDROME: ICD-10-CM

## 2020-10-29 LAB
ALBUMIN SERPL-MCNC: 4 G/DL (ref 3.4–5)
ALP SERPL-CCNC: 79 U/L (ref 40–150)
ALT SERPL W P-5'-P-CCNC: 39 U/L (ref 0–50)
ANION GAP SERPL CALCULATED.3IONS-SCNC: 11 MMOL/L (ref 3–14)
AST SERPL W P-5'-P-CCNC: 27 U/L (ref 0–45)
BASOPHILS # BLD AUTO: 0 10E9/L (ref 0–0.2)
BASOPHILS NFR BLD AUTO: 0.2 %
BILIRUB SERPL-MCNC: 0.5 MG/DL (ref 0.2–1.3)
BUN SERPL-MCNC: 8 MG/DL (ref 7–30)
CALCIUM SERPL-MCNC: 8.8 MG/DL (ref 8.5–10.1)
CHLORIDE SERPL-SCNC: 103 MMOL/L (ref 94–109)
CO2 SERPL-SCNC: 23 MMOL/L (ref 20–32)
CREAT SERPL-MCNC: 0.64 MG/DL (ref 0.52–1.04)
DIFFERENTIAL METHOD BLD: ABNORMAL
EOSINOPHIL # BLD AUTO: 0 10E9/L (ref 0–0.7)
EOSINOPHIL NFR BLD AUTO: 0 %
ERYTHROCYTE [DISTWIDTH] IN BLOOD BY AUTOMATED COUNT: 12.7 % (ref 10–15)
GFR SERPL CREATININE-BSD FRML MDRD: >90 ML/MIN/{1.73_M2}
GLUCOSE SERPL-MCNC: 103 MG/DL (ref 70–99)
HCT VFR BLD AUTO: 39.9 % (ref 35–47)
HGB BLD-MCNC: 13.5 G/DL (ref 11.7–15.7)
IMM GRANULOCYTES # BLD: 0.1 10E9/L (ref 0–0.4)
IMM GRANULOCYTES NFR BLD: 0.5 %
LIPASE SERPL-CCNC: 77 U/L (ref 73–393)
LYMPHOCYTES # BLD AUTO: 1.4 10E9/L (ref 0.8–5.3)
LYMPHOCYTES NFR BLD AUTO: 10.9 %
MCH RBC QN AUTO: 31.4 PG (ref 26.5–33)
MCHC RBC AUTO-ENTMCNC: 33.8 G/DL (ref 31.5–36.5)
MCV RBC AUTO: 93 FL (ref 78–100)
MONOCYTES # BLD AUTO: 1 10E9/L (ref 0–1.3)
MONOCYTES NFR BLD AUTO: 7.6 %
NEUTROPHILS # BLD AUTO: 10.5 10E9/L (ref 1.6–8.3)
NEUTROPHILS NFR BLD AUTO: 80.8 %
NRBC # BLD AUTO: 0 10*3/UL
NRBC BLD AUTO-RTO: 0 /100
PLATELET # BLD AUTO: 325 10E9/L (ref 150–450)
POTASSIUM SERPL-SCNC: 3.3 MMOL/L (ref 3.4–5.3)
PROT SERPL-MCNC: 8.1 G/DL (ref 6.8–8.8)
RBC # BLD AUTO: 4.3 10E12/L (ref 3.8–5.2)
SODIUM SERPL-SCNC: 136 MMOL/L (ref 133–144)
WBC # BLD AUTO: 13 10E9/L (ref 4–11)

## 2020-10-29 PROCEDURE — 96365 THER/PROPH/DIAG IV INF INIT: CPT

## 2020-10-29 PROCEDURE — 85025 COMPLETE CBC W/AUTO DIFF WBC: CPT | Performed by: EMERGENCY MEDICINE

## 2020-10-29 PROCEDURE — 96374 THER/PROPH/DIAG INJ IV PUSH: CPT

## 2020-10-29 PROCEDURE — 83690 ASSAY OF LIPASE: CPT | Performed by: EMERGENCY MEDICINE

## 2020-10-29 PROCEDURE — 250N000013 HC RX MED GY IP 250 OP 250 PS 637: Performed by: EMERGENCY MEDICINE

## 2020-10-29 PROCEDURE — 99284 EMERGENCY DEPT VISIT MOD MDM: CPT | Mod: 25

## 2020-10-29 PROCEDURE — 258N000003 HC RX IP 258 OP 636: Performed by: EMERGENCY MEDICINE

## 2020-10-29 PROCEDURE — 250N000011 HC RX IP 250 OP 636: Performed by: EMERGENCY MEDICINE

## 2020-10-29 PROCEDURE — 80053 COMPREHEN METABOLIC PANEL: CPT | Performed by: EMERGENCY MEDICINE

## 2020-10-29 PROCEDURE — 96372 THER/PROPH/DIAG INJ SC/IM: CPT | Mod: XS | Performed by: EMERGENCY MEDICINE

## 2020-10-29 PROCEDURE — 96361 HYDRATE IV INFUSION ADD-ON: CPT

## 2020-10-29 RX ORDER — CAPSAICIN 0.75 MG/G
CREAM TOPICAL ONCE
Status: COMPLETED | OUTPATIENT
Start: 2020-10-29 | End: 2020-10-29

## 2020-10-29 RX ORDER — ONDANSETRON 2 MG/ML
4 INJECTION INTRAMUSCULAR; INTRAVENOUS EVERY 30 MIN PRN
Status: DISCONTINUED | OUTPATIENT
Start: 2020-10-29 | End: 2020-10-29 | Stop reason: HOSPADM

## 2020-10-29 RX ORDER — SODIUM CHLORIDE 9 MG/ML
INJECTION, SOLUTION INTRAVENOUS CONTINUOUS
Status: DISCONTINUED | OUTPATIENT
Start: 2020-10-29 | End: 2020-10-29 | Stop reason: HOSPADM

## 2020-10-29 RX ORDER — POTASSIUM CHLORIDE 1500 MG/1
20 TABLET, EXTENDED RELEASE ORAL 2 TIMES DAILY
Qty: 14 TABLET | Refills: 0 | Status: SHIPPED | OUTPATIENT
Start: 2020-10-29

## 2020-10-29 RX ORDER — CAPSAICIN 0.75 MG/G
CREAM TOPICAL 3 TIMES DAILY
Qty: 57 G | Refills: 0 | Status: SHIPPED | OUTPATIENT
Start: 2020-10-29

## 2020-10-29 RX ORDER — POTASSIUM CHLORIDE 1500 MG/1
20 TABLET, EXTENDED RELEASE ORAL 2 TIMES DAILY
Qty: 14 TABLET | Refills: 0 | Status: SHIPPED | OUTPATIENT
Start: 2020-10-29 | End: 2020-10-29

## 2020-10-29 RX ORDER — METOCLOPRAMIDE HYDROCHLORIDE 5 MG/ML
10 INJECTION INTRAMUSCULAR; INTRAVENOUS ONCE
Status: COMPLETED | OUTPATIENT
Start: 2020-10-29 | End: 2020-10-29

## 2020-10-29 RX ORDER — OLANZAPINE 10 MG/2ML
5 INJECTION, POWDER, FOR SOLUTION INTRAMUSCULAR ONCE
Status: COMPLETED | OUTPATIENT
Start: 2020-10-29 | End: 2020-10-29

## 2020-10-29 RX ADMIN — SODIUM CHLORIDE: 9 INJECTION, SOLUTION INTRAVENOUS at 11:12

## 2020-10-29 RX ADMIN — SODIUM CHLORIDE 1000 ML: 9 INJECTION, SOLUTION INTRAVENOUS at 09:50

## 2020-10-29 RX ADMIN — OLANZAPINE 5 MG: 10 INJECTION, POWDER, FOR SOLUTION INTRAMUSCULAR at 10:45

## 2020-10-29 RX ADMIN — METOCLOPRAMIDE HYDROCHLORIDE 10 MG: 5 INJECTION INTRAMUSCULAR; INTRAVENOUS at 10:19

## 2020-10-29 RX ADMIN — CAPSAICIN: 0.75 CREAM TOPICAL at 10:49

## 2020-10-29 ASSESSMENT — ENCOUNTER SYMPTOMS
FEVER: 0
DIARRHEA: 0
BLOOD IN STOOL: 0
ABDOMINAL PAIN: 1
BACK PAIN: 1
NAUSEA: 1
VOMITING: 1
SHORTNESS OF BREATH: 0
CONSTIPATION: 0

## 2020-10-29 NOTE — ED AVS SNAPSHOT
[facility] Emergency Dept  [address]  [facility] [address]  Phone: [phone]  Fax: [phone]                                    Qian Loo   MRN: [id number]    Department: [facility] Emergency Dept   Date of Visit: 10/29/2020           After Visit Summary Signature Page    I have received my discharge instructions, and my questions have been answered. I have discussed any challenges I see with this plan with the nurse or doctor.    ..........................................................................................................................................  Patient/Patient Representative Signature      ..........................................................................................................................................  Patient Representative Print Name and Relationship to Patient    ..................................................               ................................................  Date                                   Time    ..........................................................................................................................................  Reviewed by Signature/Title    ...................................................              ..............................................  Date                                               Time          22EPIC Rev 08/18

## 2020-10-29 NOTE — ED PROVIDER NOTES
History   Chief Complaint:  Nausea    The history is provided by the patient.      Qian Loo is a 25 year old female A3 with a history of hyperemesis, bulimia, suicidal ideations, depression, and anxiety who presents to the ED for an evaluation of nausea and vomiting. Yesterday, the patient reported to the ED for the same issues that had been ongoing for the past 2 days. Today, she reports that her nausea and vomiting have not subsided despite taking the prescribed antiemetics. She has thrown up more than 10 times in a single day. She also endorses 8/10 in severity epigastric pain, described as a cramping sensation, and radiates to her back. She denies any exacerbating or alleviating factors and she is unable to keep food and fluids down. She denies any fever, shortness of breath, abnormal bowel movements, vaginal bleeding, or leg swelling. Her last bowel movement was a couple of hours ago and was runny. Of note, the patient uses marijuana daily and she last used it yesterday evening.    Allergies:  No known drug allergies     Medications:    Unisom  Reglan  Zofran    Past Medical History:    Anxiety  Hyperemesis  Depression  Suicidal ideations  Acne  Allergic rhinitis  Bulimia    Past Surgical History:    The patient does not have any pertinent past surgical history.     Family History:    History reviewed. No pertinent family history.     Social History:  Smoking status: Never  Alcohol use: Not currently, 2-3 times/month  Drug use: Yes, marijuana 7 times/week  PCP: Lora Campbell  Marital Status:  Single [1]     Review of Systems   Constitutional: Negative for fever.   Respiratory: Negative for shortness of breath.    Cardiovascular: Negative for leg swelling.   Gastrointestinal: Positive for abdominal pain, nausea and vomiting. Negative for blood in stool, constipation and diarrhea.   Genitourinary: Negative for vaginal bleeding.   Musculoskeletal: Positive for back pain.   All other systems  reviewed and are negative.      Physical Exam     Patient Vitals for the past 24 hrs:   BP Temp Pulse Resp SpO2   10/29/20 1215 99/72 -- 70 -- 100 %   10/29/20 1200 90/73 -- 72 -- 100 %   10/29/20 1145 93/62 -- 64 -- 100 %   10/29/20 1130 96/66 -- 61 -- 100 %   10/29/20 1115 96/70 -- 65 -- 100 %   10/29/20 1100 97/69 -- 58 -- 100 %   10/29/20 1045 108/80 -- 73 -- 100 %   10/29/20 1030 118/84 -- 87 -- 100 %   10/29/20 1025 101/79 -- 63 -- 100 %   10/29/20 0907 96/73 98.1  F (36.7  C) 99 16 --       Physical Exam  Constitutional: Well developed, nontox appearance  Head: Atraumatic.   Mouth/Throat: Oropharynx is clear and moist.   Neck:  no stridor  Eyes: no scleral icterus  Cardiovascular: RRR, 2+ bilat radial pulses  Pulmonary/Chest: nml resp effort, Clear BS bilat  Abdominal: ND, +BS, soft, NT, no rebound or guarding   : no CVA tenderness bilat  Ext: Warm, well perfused, no edema  Neurological: A&O, symmetric facies, moves ext x4  Skin: Skin is warm and dry.   Psychiatric: Behavior is normal. Thought content normal.   Nursing note and vitals reviewed.    Emergency Department Course     Laboratory:  Laboratory findings were communicated with the patient who voiced understanding of the findings.    CBC: WBC 13.0 (H) o/w WNL (HGB 13.5, )  CMP: Potassium 3.3 (L), Glucose 103 (H) o/w WNL (Creatinine 0.64)   Lipase: 77      Interventions:  0950 0.9% NaCl bolus 1000 mL IV   1019 Reglan 10 mg IV  1045 Zyprexa 5 mg IM  1049 Zostrix cream Topical   1112 Sodium chloride 0.9% infusion IV    Emergency Department Course:  Past medical records, nursing notes, and vitals reviewed.    0920 I performed an exam of the patient as documented above.      IV was inserted and blood was drawn for laboratory testing, results above.    1035 I rechecked the patient and discussed the results of her workup thus far. The patient does not feel comfortable going home as she does not feel improve.     1212 I rechecked the patient prior to  discharge.     Findings and plan explained to the Patient. Patient discharged home with instructions regarding supportive care, medications, and reasons to return. The importance of close follow-up was reviewed. The patient was prescribed Zostrix and potassium chloride. I personally reviewed the laboratory results with the Patient and answered all related questions prior to discharge.     Impression & Plan   Medical Decision Makin year old female presenting w/ vomiting, abdominal pain     DDx includes for trimester nausea and vomiting, cannabinoid hyperemesis, cyclic vomiting syndrome, gastritis, GERD, esophagitis, pancreatitis, hepatitis, alcoholic gastritis, gastric ulcer.  Doubt significant upper GI bleed given history.  Labs significant for minimal hypokalemia, leukocytosis as expected in pregnancy.  Given exam and history, I do not think emergent imaging is indicated at this time.  Doubt vascular catastrophe, hollow viscus perforation, intra-abdominal abscess or other surgical etiology.  Medications given as noted above with significant improvement in symptoms.  Presentation seems most consistent with gastritis versus cannabinoid hyperemesis syndrome or cyclic vomiting.  At this time I feel the pt is safe for discharge.  Recommendations given regarding follow up with PCP and return to the emergency department as needed for new or worsening symptoms.  Pt counseled on all results, disposition and diagnosis and need for cessaton of marijuana use.  They are understanding and agreeable to plan. Patient discharged in stable condition.      Diagnosis:    ICD-10-CM    1. Cannabinoid hyperemesis syndrome  R11.2     F12.90    2. Non-intractable vomiting with nausea, unspecified vomiting type  R11.2        Disposition:  Discharged to home.    Discharge Medications:  Discharge Medication List as of 10/29/2020 12:19 PM      START taking these medications    Details   capsaicin (ZOSTRIX) 0.075 % cream Apply topically 3  times dailyDisp-57 g, R-0Local Print             Scribe Disclosure:  I, Yamile Constantino, am serving as a scribe at 10:26 AM on 10/29/2020 to document services personally performed by Eddie Paulino MD based on my observations and the provider's statements to me.   I, Eric Rodríguez, am serving as a scribe at 1:03 PM on 10/29/2020 to document services personally performed by Eddie Paulino MD  based on my observations and the provider's statements to me.         Eddie Paulino MD  10/30/20 0009

## 2020-10-29 NOTE — ED TRIAGE NOTES
Pt presents to ED with nausea and vomiting. Was seen yesterday for same. 5 weeks pregnant. Unable to keep home antiemetics down.

## 2020-10-29 NOTE — DISCHARGE INSTRUCTIONS
Please stop using marijuana.  It is the most likely cause for your symptoms today.    Please take medications as previously prescribed.    Please return to the emergency department as needed for new or worsening symptoms including severe and uncontrollable vomiting, severe pain, vaginal bleeding, fainting, any other concerning symptoms.    Please follow-up in 1 week for repeat urinalysis to evaluate for bacteria in your urine.  Should you still have bacteria in your urine, you should likely be placed on antibiotics.    Discharge Instructions  Vomiting    You have been seen today for vomiting (throwing up). This is usually caused by a virus, but some bacteria, parasites, medicines or other medical conditions can cause similar symptoms. At this time your provider does not find that your vomiting is a sign of anything dangerous or life-threatening. However, sometimes the signs of serious illness do not show up right away. If you have new or worse symptoms, you may need to be seen again in the Emergency Department or by your primary provider. Remember that serious problems like appendicitis can start as vomiting.    Generally, every Emergency Department visit should have a follow-up clinic visit with either a primary or a specialty clinic/provider. Please follow-up as instructed by your emergency provider today.    Return to the Emergency Department if:  You keep vomiting and you are not able to keep liquids down.   You feel you are getting dehydrated, such as being very thirsty, not urinating (peeing) at least every 8-12 hours, or feeling faint or lightheaded.   You develop a new fever, or your fever continues for more than 2 days.   You have abdominal (belly pain) that seems worse than cramps, is in one spot, or is getting worse over time. Appendicitis usually causes pain in the right lower abdomen (to the right and below your belly button) so watch for pain in this location.  You have blood in your vomit or stools.    You feel very weak.  You are not starting to improve within 24 hours of your visit here.     What can I do to help myself?  The most important thing to do is to drink clear liquids. If you have been vomiting a lot, it is best to have only small, frequent sips of liquids. Drinking too much at once may cause more vomiting. If you are vomiting often, you must replace minerals, sodium and potassium lost with your illness. Pedialyte  is the best available rehydration liquid but some find that it doesn t taste good so sports drinks are an alterative. You can also drink clear liquids such as water, weak tea, apple juice, and 7-Up . Avoid acid liquids (orange), caffeine (coffee) or alcohol. Do not drink milk until you no longer have diarrhea (loose stools).   After liquids are staying down, you may start eating mild foods. Soda crackers, toast, plain noodles, gelatin, applesauce and bananas are good first choices. Avoid foods that have acid, are spicy, fatty or have a lot of fiber (such as meats, coarse grains, vegetables). You may start eating these foods again in about 3 days when you are better.   Sometimes treatment includes prescription medicine to prevent nausea (sick to your stomach) and vomiting. If your provider prescribes these for you, take them as directed.   Do not take ibuprofen, naproxen, or other nonsteroidal anti-inflammatory (NSAID) medicines without checking with your healthcare provider.     If you were given a prescription for medicine here today, be sure to read all of the information (including the package insert) that comes with your prescription.  This will include important information about the medicine, its side effects, and any warnings that you need to know about.  The pharmacist who fills the prescription can provide more information and answer questions you may have about the medicine.  If you have questions or concerns that the pharmacist cannot address, please call or return to the  Emergency Department.     Remember that you can always come back to the Emergency Department if you are not able to see your regular provider in the amount of time listed above, if you get any new symptoms, or if there is anything that worries you.

## 2020-11-20 ENCOUNTER — HOSPITAL ENCOUNTER (EMERGENCY)
Facility: CLINIC | Age: 25
Discharge: HOME | End: 2020-11-20
Attending: EMERGENCY MEDICINE | Admitting: EMERGENCY MEDICINE
Payer: COMMERCIAL

## 2020-11-20 VITALS
RESPIRATION RATE: 16 BRPM | DIASTOLIC BLOOD PRESSURE: 84 MMHG | TEMPERATURE: 98.4 F | SYSTOLIC BLOOD PRESSURE: 105 MMHG | OXYGEN SATURATION: 100 % | HEART RATE: 105 BPM

## 2020-11-20 DIAGNOSIS — O21.0 HYPEREMESIS GRAVIDARUM: ICD-10-CM

## 2020-11-20 DIAGNOSIS — E86.0 DEHYDRATION: ICD-10-CM

## 2020-11-20 LAB
ALBUMIN UR-MCNC: 30 MG/DL
ANION GAP SERPL CALCULATED.3IONS-SCNC: 5 MMOL/L (ref 3–14)
APPEARANCE UR: ABNORMAL
BASOPHILS # BLD AUTO: 0 10E9/L (ref 0–0.2)
BASOPHILS NFR BLD AUTO: 0.3 %
BILIRUB UR QL STRIP: NEGATIVE
BUN SERPL-MCNC: 4 MG/DL (ref 7–30)
CALCIUM SERPL-MCNC: 8.8 MG/DL (ref 8.5–10.1)
CHLORIDE SERPL-SCNC: 105 MMOL/L (ref 94–109)
CO2 SERPL-SCNC: 27 MMOL/L (ref 20–32)
COLOR UR AUTO: YELLOW
CREAT SERPL-MCNC: 0.59 MG/DL (ref 0.52–1.04)
DIFFERENTIAL METHOD BLD: NORMAL
EOSINOPHIL # BLD AUTO: 0 10E9/L (ref 0–0.7)
EOSINOPHIL NFR BLD AUTO: 0.2 %
ERYTHROCYTE [DISTWIDTH] IN BLOOD BY AUTOMATED COUNT: 11.9 % (ref 10–15)
GFR SERPL CREATININE-BSD FRML MDRD: >90 ML/MIN/{1.73_M2}
GLUCOSE SERPL-MCNC: 97 MG/DL (ref 70–99)
GLUCOSE UR STRIP-MCNC: NEGATIVE MG/DL
HCT VFR BLD AUTO: 40.7 % (ref 35–47)
HGB BLD-MCNC: 14.2 G/DL (ref 11.7–15.7)
HGB UR QL STRIP: NEGATIVE
IMM GRANULOCYTES # BLD: 0 10E9/L (ref 0–0.4)
IMM GRANULOCYTES NFR BLD: 0.4 %
KETONES UR STRIP-MCNC: 150 MG/DL
LEUKOCYTE ESTERASE UR QL STRIP: ABNORMAL
LYMPHOCYTES # BLD AUTO: 2.1 10E9/L (ref 0.8–5.3)
LYMPHOCYTES NFR BLD AUTO: 19.8 %
MCH RBC QN AUTO: 32.1 PG (ref 26.5–33)
MCHC RBC AUTO-ENTMCNC: 34.9 G/DL (ref 31.5–36.5)
MCV RBC AUTO: 92 FL (ref 78–100)
MONOCYTES # BLD AUTO: 0.4 10E9/L (ref 0–1.3)
MONOCYTES NFR BLD AUTO: 3.9 %
MUCOUS THREADS #/AREA URNS LPF: PRESENT /LPF
NEUTROPHILS # BLD AUTO: 8.1 10E9/L (ref 1.6–8.3)
NEUTROPHILS NFR BLD AUTO: 75.4 %
NITRATE UR QL: NEGATIVE
NRBC # BLD AUTO: 0 10*3/UL
NRBC BLD AUTO-RTO: 0 /100
PH UR STRIP: 6 PH (ref 5–7)
PLATELET # BLD AUTO: 239 10E9/L (ref 150–450)
POTASSIUM SERPL-SCNC: 3.6 MMOL/L (ref 3.4–5.3)
RBC # BLD AUTO: 4.42 10E12/L (ref 3.8–5.2)
RBC #/AREA URNS AUTO: 2 /HPF (ref 0–2)
SODIUM SERPL-SCNC: 137 MMOL/L (ref 133–144)
SOURCE: ABNORMAL
SP GR UR STRIP: 1.03 (ref 1–1.03)
SQUAMOUS #/AREA URNS AUTO: 12 /HPF (ref 0–1)
UROBILINOGEN UR STRIP-MCNC: NORMAL MG/DL (ref 0–2)
WBC # BLD AUTO: 10.8 10E9/L (ref 4–11)
WBC #/AREA URNS AUTO: 4 /HPF (ref 0–5)

## 2020-11-20 PROCEDURE — 85025 COMPLETE CBC W/AUTO DIFF WBC: CPT | Performed by: EMERGENCY MEDICINE

## 2020-11-20 PROCEDURE — 96375 TX/PRO/DX INJ NEW DRUG ADDON: CPT

## 2020-11-20 PROCEDURE — 258N000003 HC RX IP 258 OP 636: Performed by: EMERGENCY MEDICINE

## 2020-11-20 PROCEDURE — 250N000011 HC RX IP 250 OP 636: Performed by: EMERGENCY MEDICINE

## 2020-11-20 PROCEDURE — 81001 URINALYSIS AUTO W/SCOPE: CPT | Performed by: EMERGENCY MEDICINE

## 2020-11-20 PROCEDURE — 96374 THER/PROPH/DIAG INJ IV PUSH: CPT

## 2020-11-20 PROCEDURE — 80048 BASIC METABOLIC PNL TOTAL CA: CPT | Performed by: EMERGENCY MEDICINE

## 2020-11-20 PROCEDURE — 96361 HYDRATE IV INFUSION ADD-ON: CPT

## 2020-11-20 PROCEDURE — 99284 EMERGENCY DEPT VISIT MOD MDM: CPT | Mod: 25

## 2020-11-20 RX ORDER — METOCLOPRAMIDE HYDROCHLORIDE 5 MG/ML
10 INJECTION INTRAMUSCULAR; INTRAVENOUS ONCE
Status: COMPLETED | OUTPATIENT
Start: 2020-11-20 | End: 2020-11-20

## 2020-11-20 RX ORDER — ONDANSETRON 4 MG/1
4 TABLET, ORALLY DISINTEGRATING ORAL EVERY 8 HOURS PRN
Qty: 10 TABLET | Refills: 0 | Status: SHIPPED | OUTPATIENT
Start: 2020-11-20 | End: 2020-11-23

## 2020-11-20 RX ORDER — METOCLOPRAMIDE 10 MG/1
10 TABLET ORAL 3 TIMES DAILY PRN
Qty: 20 TABLET | Refills: 1 | Status: SHIPPED | OUTPATIENT
Start: 2020-11-20 | End: 2020-12-27

## 2020-11-20 RX ORDER — ONDANSETRON 2 MG/ML
4 INJECTION INTRAMUSCULAR; INTRAVENOUS ONCE
Status: COMPLETED | OUTPATIENT
Start: 2020-11-20 | End: 2020-11-20

## 2020-11-20 RX ADMIN — METOCLOPRAMIDE HYDROCHLORIDE 10 MG: 5 INJECTION INTRAMUSCULAR; INTRAVENOUS at 21:52

## 2020-11-20 RX ADMIN — ONDANSETRON 4 MG: 2 INJECTION INTRAMUSCULAR; INTRAVENOUS at 19:35

## 2020-11-20 RX ADMIN — SODIUM CHLORIDE 1000 ML: 9 INJECTION, SOLUTION INTRAVENOUS at 19:35

## 2020-11-20 ASSESSMENT — ENCOUNTER SYMPTOMS
NAUSEA: 1
DYSURIA: 0
ABDOMINAL PAIN: 1
DIARRHEA: 0
DIFFICULTY URINATING: 0
FREQUENCY: 0
VOMITING: 1

## 2020-11-20 NOTE — ED AVS SNAPSHOT
[facility] Emergency [facility]  [address]  [facility] [address]  Phone: [phone]  Fax: [phone]                                    Qian Loo   MRN: [id number]    Department: [facility] Emergency [facility]   Date of Visit: 11/20/2020           After Visit Summary Signature Page    I have received my discharge instructions, and my questions have been answered. I have discussed any challenges I see with this plan with the nurse or doctor.    ..........................................................................................................................................  Patient/Patient Representative Signature      ..........................................................................................................................................  Patient Representative Print Name and Relationship to Patient    ..................................................               ................................................  Date                                   Time    ..........................................................................................................................................  Reviewed by Signature/Title    ...................................................              ..............................................  Date                                               Time          22EPIC Rev 08/18

## 2020-11-21 ASSESSMENT — ENCOUNTER SYMPTOMS: CONSTIPATION: 1

## 2020-11-21 NOTE — ED NOTES
Pt reports improvement in sx following med adm, given water and apple juice, denies needing anything at this time.

## 2020-11-21 NOTE — DISCHARGE INSTRUCTIONS
Diagnosis: Hyperemesis, Abdominal pain    What do you do next:   Continue your home medications unless we have specifically changed them  Follow up as indicated below.   *Take medications as prescribed, small frequent meals.   When do you return to the ED: If you have uncontrolled pain, or any new  symptoms that concern you or worsening symptoms, please return to the ED for repeat evaluation.    Thank you for allowing us to care for you today.

## 2020-11-21 NOTE — ED PROVIDER NOTES
"  History   Chief Complaint:  Emesis During Pregnancy    HPI  Qian Loo is a 25 year old female A3 and currently pregnant with a last menstrual period of , with a history of hyperemesis, who presents to the ED for evaluation of emesis during pregnancy. The patient reports \"extreme nausea\" occurring over the past 2 days.  She has been struggling with nausea and left sided abdominal pain throughout her pregnancy and does have nausea medication at home, however it has not provided relief over the last two days. Given this change, she has not been able to tolerate any food or liquid at home and prompted presentation to the ED. She also reports having a bowel movement every 4 days and when she does, the stool is hard. She denies having any hematemesis, no vaginal bleeding, vaginal discharge, or any urinary symptoms.  She states that from a mental health standpoint she is doing well at home at this time.    US OB <14 Weeks with transvaginal - 2020  Imaging independently reviewed and agree with radiologist interpretation.   1. Early single living intrauterine pregnancy with sonographic gestational age of 7 weeks 1 day, nearly matching the GA by LMP.  2. Area of subchorionic hemorrhage. This is thin, but surrounds roughly 1/3 of the circumference of the gestational sac.    Allergies:  No known allergies    Medications:    Potassium chloride  Prenatal vitamin  Zofran ODT    Past Medical History:    Suicidality  Anxiety  Depression  Bulimia  Allergic rhinitis  Hyperemesis    Past Surgical History:    The patient does not have any pertinent past surgical history.    Family History:    No past pertinent family history.    Social History:  Marital Status: Single  Smoking status: No  Alcohol use: Not currently  Drug use: Yes - Marijuana  PCP: Lora Campbell    Review of Systems   Gastrointestinal: Positive for abdominal pain, constipation, nausea and vomiting. Negative for diarrhea.   Genitourinary: " Negative for difficulty urinating, dysuria, frequency, vaginal bleeding and vaginal discharge.   All other systems reviewed and are negative.    Physical Exam     Patient Vitals for the past 24 hrs:   BP Temp Temp src Pulse Resp SpO2   11/20/20 1859 105/84 98.4  F (36.9  C) Oral 105 16 100 %       Physical Exam    Nursing note and vitals reviewed.    Constitutional: Pleasant and well groomed.          HENT:    Mouth/Throat: Oropharynx is without swelling or erythema. Oral mucosa moist.    Eyes: Conjunctivae are normal. No scleral icterus.    Neck: Neck supple.   Cardiovascular: Borderline tachycardic, regular rhythm and intact distal pulses.    Pulmonary/Chest: Effort normal and breath sounds normal.   Abdominal: Soft.  No distension. There is minimal tenderness to left lower quadrant with deep palpation.   Musculoskeletal:  No edema, No calf tenderness  Neurological:Alert. Coordination normal.   Skin: Skin is warm and dry.   Psychiatric: Normal mood and affect.     Emergency Department Course   Imaging:  Radiology findings were communicated with the patient who voiced understanding of the findings.    POC US ABDOMEN LIMITED   Final Result   Limited Pelvic US:    Indication: pregnant with abdominal pain   ED Limited Bedside Screening Ultrasound performed by Stacy Harvey MD   View: Uterus - Transabdominal longitudinal and transverse   Findings: IUP with cardiac activity,   Impression: IUP with cardiac activity.    Images were saved      Stacy Harvey MD        Laboratory:  Laboratory findings were communicated with the patient who voiced understanding of the findings.    CBC: WBC: 10.8, HGB: 14.2, PLT: 239     BMP: BUN 4 (L) o/w WNL (Creatinine 0.59)    UA with micro: Ketones 150, Protein Albumin 30, Leukocyte Esterase Small, Squamous Epithelial/HPF 12 (H), Mucous Present, o/w negative    Interventions:  1935: NS 1000 mL IV Bolus   1935: Zofran 4 mg IV  2152: Reglan 10 mg IV    Emergency Department  Course:  Past medical records, nursing notes, and vitals reviewed.    2001 I performed an exam of the patient as documented above.      IV was inserted and blood was drawn for laboratory testing, results above.    The patient provided a urine sample here in the emergency department. This was sent for laboratory testing, findings above.    2130 I rechecked the patient and discussed the results of her workup thus far. I performed a bedside US, as documented above.    Findings and plan explained to the Patient. Patient discharged home with instructions regarding supportive care, medications, and reasons to return. The importance of close follow-up was reviewed. She was prescribed Zofran and Reglan.     I personally reviewed the laboratory and imaging results with the Patient and answered all related questions prior to discharge.     Impression & Plan    Medical Decision Making:   Qian Loo is a 25 year old female who is approximately 9 weeks pregnant presents with vomiting.  She also has some abdominal pain but states this has been present since she found out she was pregnant.  She has a benign abdominal exam.  I did perform a bedside ultrasound which revealed an IUP with cardiac activity.  Electrolytes were reassuring.  She did have ketones in her urine.  She is feeling better with IV fluids and antiemetics.  She was drinking apple juice in the emergency department.  I offered a second liter of fluids with D5 normal saline however she felt better and had called for her ride for return home.  She will return with any new or worsening symptoms.  She will arrange follow-up with her OB/GYN in the next to the 3 to 4 days.    Diagnosis:     ICD-10-CM    1. Hyperemesis gravidarum  O21.0    2. Dehydration  E86.0        Disposition:   Discharged to home.    Discharge Medications:  Zofran ODT - 4 mg tablet  Reglan - 10 mg tablet    Scribe Disclosure:  Ginny MARTINEZ, am serving as a scribe on 11/20/2020 at 8:01  PM to personally document services performed by Stacy Harvey MD, based on my observations and the provider's statements to me.    I, Ivon Beatriec, am serving as a scribe on 11/20/2020 at 11:58 PM to personally document services performed by Stacy Harvey MD based on my observations and the provider's statements to me.             Stacy Harvey MD  11/21/20 0019

## 2020-11-22 ENCOUNTER — HEALTH MAINTENANCE LETTER (OUTPATIENT)
Age: 25
End: 2020-11-22

## 2020-12-02 ENCOUNTER — HOSPITAL ENCOUNTER (EMERGENCY)
Facility: CLINIC | Age: 25
Discharge: HOME | End: 2020-12-02
Attending: EMERGENCY MEDICINE | Admitting: EMERGENCY MEDICINE
Payer: COMMERCIAL

## 2020-12-02 VITALS
HEART RATE: 98 BPM | SYSTOLIC BLOOD PRESSURE: 98 MMHG | OXYGEN SATURATION: 100 % | TEMPERATURE: 98.5 F | RESPIRATION RATE: 20 BRPM | DIASTOLIC BLOOD PRESSURE: 75 MMHG

## 2020-12-02 DIAGNOSIS — O21.0 HYPEREMESIS GRAVIDARUM: ICD-10-CM

## 2020-12-02 LAB
ALBUMIN SERPL-MCNC: 3.6 G/DL (ref 3.4–5)
ALBUMIN UR-MCNC: 20 MG/DL
ALP SERPL-CCNC: 56 U/L (ref 40–150)
ALT SERPL W P-5'-P-CCNC: 12 U/L (ref 0–50)
AMPHETAMINES UR QL SCN: NEGATIVE
ANION GAP SERPL CALCULATED.3IONS-SCNC: 9 MMOL/L (ref 3–14)
APPEARANCE UR: CLEAR
AST SERPL W P-5'-P-CCNC: 12 U/L (ref 0–45)
BARBITURATES UR QL: NEGATIVE
BASOPHILS # BLD AUTO: 0 10E9/L (ref 0–0.2)
BASOPHILS NFR BLD AUTO: 0.2 %
BENZODIAZ UR QL: NEGATIVE
BILIRUB DIRECT SERPL-MCNC: 0.1 MG/DL (ref 0–0.2)
BILIRUB SERPL-MCNC: 0.3 MG/DL (ref 0.2–1.3)
BILIRUB UR QL STRIP: NEGATIVE
BUN SERPL-MCNC: 4 MG/DL (ref 7–30)
CALCIUM SERPL-MCNC: 9.3 MG/DL (ref 8.5–10.1)
CANNABINOIDS UR QL SCN: POSITIVE
CHLORIDE SERPL-SCNC: 105 MMOL/L (ref 94–109)
CO2 SERPL-SCNC: 23 MMOL/L (ref 20–32)
COCAINE UR QL: NEGATIVE
COLOR UR AUTO: YELLOW
CREAT SERPL-MCNC: 0.5 MG/DL (ref 0.52–1.04)
DIFFERENTIAL METHOD BLD: NORMAL
EOSINOPHIL # BLD AUTO: 0 10E9/L (ref 0–0.7)
EOSINOPHIL NFR BLD AUTO: 0.1 %
ERYTHROCYTE [DISTWIDTH] IN BLOOD BY AUTOMATED COUNT: 12.1 % (ref 10–15)
GFR SERPL CREATININE-BSD FRML MDRD: >90 ML/MIN/{1.73_M2}
GLUCOSE SERPL-MCNC: 108 MG/DL (ref 70–99)
GLUCOSE UR STRIP-MCNC: NEGATIVE MG/DL
HCT VFR BLD AUTO: 38.7 % (ref 35–47)
HGB BLD-MCNC: 13.4 G/DL (ref 11.7–15.7)
HGB UR QL STRIP: NEGATIVE
IMM GRANULOCYTES # BLD: 0 10E9/L (ref 0–0.4)
IMM GRANULOCYTES NFR BLD: 0.4 %
KETONES UR STRIP-MCNC: >150 MG/DL
LEUKOCYTE ESTERASE UR QL STRIP: ABNORMAL
LIPASE SERPL-CCNC: 96 U/L (ref 73–393)
LYMPHOCYTES # BLD AUTO: 1.9 10E9/L (ref 0.8–5.3)
LYMPHOCYTES NFR BLD AUTO: 18 %
MCH RBC QN AUTO: 31.8 PG (ref 26.5–33)
MCHC RBC AUTO-ENTMCNC: 34.6 G/DL (ref 31.5–36.5)
MCV RBC AUTO: 92 FL (ref 78–100)
MONOCYTES # BLD AUTO: 0.5 10E9/L (ref 0–1.3)
MONOCYTES NFR BLD AUTO: 4.9 %
MUCOUS THREADS #/AREA URNS LPF: PRESENT /LPF
NEUTROPHILS # BLD AUTO: 7.9 10E9/L (ref 1.6–8.3)
NEUTROPHILS NFR BLD AUTO: 76.4 %
NITRATE UR QL: NEGATIVE
NRBC # BLD AUTO: 0 10*3/UL
NRBC BLD AUTO-RTO: 0 /100
OPIATES UR QL SCN: NEGATIVE
PCP UR QL SCN: NEGATIVE
PH UR STRIP: 6 PH (ref 5–7)
PLATELET # BLD AUTO: 311 10E9/L (ref 150–450)
POTASSIUM SERPL-SCNC: 3.6 MMOL/L (ref 3.4–5.3)
PROT SERPL-MCNC: 7.4 G/DL (ref 6.8–8.8)
RBC # BLD AUTO: 4.22 10E12/L (ref 3.8–5.2)
RBC #/AREA URNS AUTO: 1 /HPF (ref 0–2)
SODIUM SERPL-SCNC: 137 MMOL/L (ref 133–144)
SOURCE: ABNORMAL
SP GR UR STRIP: 1.03 (ref 1–1.03)
SQUAMOUS #/AREA URNS AUTO: 8 /HPF (ref 0–1)
UROBILINOGEN UR STRIP-MCNC: NORMAL MG/DL (ref 0–2)
WBC # BLD AUTO: 10.3 10E9/L (ref 4–11)
WBC #/AREA URNS AUTO: 5 /HPF (ref 0–5)

## 2020-12-02 PROCEDURE — 83690 ASSAY OF LIPASE: CPT | Performed by: EMERGENCY MEDICINE

## 2020-12-02 PROCEDURE — 80048 BASIC METABOLIC PNL TOTAL CA: CPT | Performed by: EMERGENCY MEDICINE

## 2020-12-02 PROCEDURE — 96374 THER/PROPH/DIAG INJ IV PUSH: CPT

## 2020-12-02 PROCEDURE — 80076 HEPATIC FUNCTION PANEL: CPT | Performed by: EMERGENCY MEDICINE

## 2020-12-02 PROCEDURE — 99284 EMERGENCY DEPT VISIT MOD MDM: CPT | Mod: 25

## 2020-12-02 PROCEDURE — 81001 URINALYSIS AUTO W/SCOPE: CPT | Mod: XU | Performed by: EMERGENCY MEDICINE

## 2020-12-02 PROCEDURE — 80307 DRUG TEST PRSMV CHEM ANLYZR: CPT | Performed by: EMERGENCY MEDICINE

## 2020-12-02 PROCEDURE — 250N000011 HC RX IP 250 OP 636: Performed by: EMERGENCY MEDICINE

## 2020-12-02 PROCEDURE — 96375 TX/PRO/DX INJ NEW DRUG ADDON: CPT

## 2020-12-02 PROCEDURE — 96361 HYDRATE IV INFUSION ADD-ON: CPT

## 2020-12-02 PROCEDURE — 258N000003 HC RX IP 258 OP 636: Performed by: EMERGENCY MEDICINE

## 2020-12-02 PROCEDURE — 85025 COMPLETE CBC W/AUTO DIFF WBC: CPT | Performed by: EMERGENCY MEDICINE

## 2020-12-02 RX ORDER — PROMETHAZINE HYDROCHLORIDE 25 MG/1
25 SUPPOSITORY RECTAL EVERY 6 HOURS PRN
Qty: 20 SUPPOSITORY | Refills: 0 | Status: SHIPPED | OUTPATIENT
Start: 2020-12-02

## 2020-12-02 RX ORDER — ONDANSETRON 2 MG/ML
4 INJECTION INTRAMUSCULAR; INTRAVENOUS ONCE
Status: COMPLETED | OUTPATIENT
Start: 2020-12-02 | End: 2020-12-02

## 2020-12-02 RX ORDER — ONDANSETRON 4 MG/1
4 TABLET, ORALLY DISINTEGRATING ORAL EVERY 6 HOURS PRN
Qty: 20 TABLET | Refills: 0 | Status: SHIPPED | OUTPATIENT
Start: 2020-12-02 | End: 2020-12-21

## 2020-12-02 RX ORDER — OLANZAPINE 10 MG/2ML
5 INJECTION, POWDER, FOR SOLUTION INTRAMUSCULAR
Status: DISCONTINUED | OUTPATIENT
Start: 2020-12-02 | End: 2020-12-02 | Stop reason: HOSPADM

## 2020-12-02 RX ORDER — METOCLOPRAMIDE HYDROCHLORIDE 5 MG/ML
10 INJECTION INTRAMUSCULAR; INTRAVENOUS ONCE
Status: COMPLETED | OUTPATIENT
Start: 2020-12-02 | End: 2020-12-02

## 2020-12-02 RX ADMIN — SODIUM CHLORIDE 1000 ML: 9 INJECTION, SOLUTION INTRAVENOUS at 14:19

## 2020-12-02 RX ADMIN — ONDANSETRON 4 MG: 2 INJECTION INTRAMUSCULAR; INTRAVENOUS at 14:19

## 2020-12-02 RX ADMIN — METOCLOPRAMIDE HYDROCHLORIDE 10 MG: 5 INJECTION INTRAMUSCULAR; INTRAVENOUS at 14:21

## 2020-12-02 NOTE — DISCHARGE INSTRUCTIONS
Patient plan fluids.    Please follow-up with your OB/GYN this week or early next week.    Please return to the emergency department as needed for new or worsening symptoms including vomiting and unable to keep eating down, severe and uncontrollable pain, fever greater than 100.4  F, any other concerning symptoms.

## 2020-12-02 NOTE — ED TRIAGE NOTES
Pt presents to ed with vomiting and abdominal pain. Seen for same on the 20th. Feeling better when she left vomiting has since continued, but got more severe around 2 AM. Currently 11 weeks pregnant. Taking zofran and reglan without relief

## 2020-12-02 NOTE — ED AVS SNAPSHOT
[facility]  [address]  [facility] [address]  Phone: [phone]  Fax: [phone]                                    Qian Loo   MRN: [id number]    Department: [facility] Emergency Dept   Date of Visit: 12/2/2020           After Visit Summary Signature Page    I have received my discharge instructions, and my questions have been answered. I have discussed any challenges I see with this plan with the nurse or doctor.    ..........................................................................................................................................  Patient/Patient Representative Signature      ..........................................................................................................................................  Patient Representative Print Name and Relationship to Patient    ..................................................               ................................................  Date                                   Time    ..........................................................................................................................................  Reviewed by Signature/Title    ...................................................              ..............................................  Date                                               Time          22EPIC Rev 08/18

## 2020-12-02 NOTE — ED PROVIDER NOTES
History     Chief Complaint:  Abdominal Pain and Vomiting       HPI   Qian Loo is a 25-year-old female with past medical history significant for  currently pregnant who presents the emergency department with chief complaint of nausea and nonbloody vomiting, intermittent abdominal cramping, 8/10, somewhat improved with vomiting, worse with p.o. intake.  Patient has been seen in the emergency department multiple times for similar symptoms.  She denies fever, cough, chest pain, shortness of breath, changes in urination, changes in bowel movements, rashes.  She denies that she is used marijuana since the end of October.  She reports that she is using her previous medications as prescribed without alleviation.  Allergies:  No known allergies     Medications:    Potassium chloride  Prenatal vitamin     Past Medical History:    Suicidality  Anxiety  Depression  Bulimia  Allergic rhinitis  Hyperemesis     Past Surgical History:    The patient does not have any pertinent past surgical history.     Family History:    No past pertinent family history.     Social History:  Marital Status: Single  Smoking status: No  Alcohol use: Not currently  Drug use: Yes - Marijuana  PCP: Lora Campbell      Review of Systems  Full ROS completed and negative other than pertinent positives and negatives noted in HPI       Physical Exam     Patient Vitals for the past 24 hrs:   BP Temp Temp src Pulse Resp SpO2   20 1500 103/74 -- -- 96 -- 100 %   20 1445 104/71 -- -- 84 -- 99 %   20 1430 109/79 -- -- 120 -- 99 %   20 1415 -- -- -- -- -- 99 %   20 1400 -- -- -- -- -- 98 %   20 1336 109/87 98.5  F (36.9  C) Oral -- 20 97 %        Physical Exam  Constitutional: Well developed, forlorn, nontox appearance  Head: Atraumatic.   Mouth/Throat: Oropharynx is clear and tacky.   Neck:  no stridor  Eyes: no scleral icterus  Cardiovascular: RRR, 2+ bilat radial pulses  Pulmonary/Chest: nml resp  effort  Abdominal: ND, soft, diffuse mild abdominal tenderness, no rebound or guarding   : no CVA tenderness bilat  Ext: Warm, well perfused, no edema  Neurological: A&O, symmetric facies, moves ext x4  Skin: Skin is warm and dry.   Psychiatric: Behavior is normal. Thought content normal.   Nursing note and vitals reviewed.    Emergency Department Course     Laboratory:  Laboratory findings were communicated with the patient who voiced understanding of the findings.    UA with micro: Urineketone >150 (A) Protein Albumin Urine 20 (A) Leukocyte esterase urine trace (A) Squamous epithelial/HPF urine 8 (H) Mucous urine present (A)  o/w wnl/negative       CBC:  WBC 10.3, HGB 13.4, , o/w WNL     BMP: Glucose 108 (H), BUN 4 (L), o/w WNL (Creatinine: 0.50 (L))     Hepatic panel:  wnl     Lipase: 96      Drug abuse screen 77 urine: cannabinoids positive (A), o/w negative      Interventions:  1419 0.9% sodium chloride BOLUS 1000 mL IV   1419 zofran 4 mg IV   1421 reglan 10 mg IV    Emergency Department Course:  Past medical records, nursing notes, and vitals reviewed.    1416 I performed an exam of the patient as documented above.        Findings and plan explained to the Patient. Patient discharged home with instructions regarding supportive care, medications, and reasons to return. The importance of close follow-up was reviewed. The patient was prescribed promethazine.       Impression & Plan     Medical Decision Making:  Qian Loo is a 25 year old female presenting w/ nausea vomiting     DDx includes hyperemesis gravidarum, electrolyte abnormality, dehydration, cannabinoid hyperemesis, cyclic vomiting, less likely UTI given no new urinary symptoms.  Doubt surgical etiology or bowel obstruction given history and physical exam.  Labs significant for unremarkable, UA significant for no bacteria or signs of infection.  Given given results from previous ED visits demonstrating IUP, I do not feel emergent  imaging is indicated at this time given ectopic or heterotopic pregnancy is very unlikely.  Interventions as noted above with improvement in symptoms.  Although pt denies marijuana use, given drug screen, I feel pt may have a component of cannabinoid hyperemesis as well. (Drug screen returned after discharge given pt did not want to wait for urine studies).  Patient was able to tolerate p.o. in the emergency department.  At this time I feel the pt is safe for discharge.  Recommendations given regarding follow up with PCP and return to the emergency department as needed for new or worsening symptoms.  Patient counseled on all results, disposition and diagnosis.  They are understanding and agreeable to plan. Patient discharged in stable condition.       Discharge Diagnosis:    ICD-10-CM    1. Hyperemesis gravidarum  O21.0 UA with Microscopic       Disposition:  Discharged to home.    Discharge Medications:  New Prescriptions    PROMETHAZINE (PHENERGAN) 25 MG SUPPOSITORY    Place 1 suppository (25 mg) rectally every 6 hours as needed for nausea (vomiting)       Scribe Disclosure:  I, Rajesh Nicolas, am serving as a scribe at 2:16 PM on 12/2/2020 to document services personally performed by Eddie Paulino MD based on my observations and the provider's statements to me.      12/2/2020   Eddie Paulino MD Vaughn, Christopher E, MD  12/03/20 3011

## 2020-12-05 ENCOUNTER — HOSPITAL ENCOUNTER (EMERGENCY)
Facility: CLINIC | Age: 25
Discharge: HOME | End: 2020-12-05
Attending: EMERGENCY MEDICINE | Admitting: EMERGENCY MEDICINE
Payer: COMMERCIAL

## 2020-12-05 VITALS
BODY MASS INDEX: 15.77 KG/M2 | HEART RATE: 80 BPM | DIASTOLIC BLOOD PRESSURE: 92 MMHG | RESPIRATION RATE: 16 BRPM | SYSTOLIC BLOOD PRESSURE: 129 MMHG | OXYGEN SATURATION: 99 % | HEIGHT: 63 IN | TEMPERATURE: 97 F

## 2020-12-05 DIAGNOSIS — O21.0 HYPEREMESIS GRAVIDARUM: ICD-10-CM

## 2020-12-05 DIAGNOSIS — R10.84 GENERALIZED ABDOMINAL PAIN: ICD-10-CM

## 2020-12-05 LAB
ALBUMIN SERPL-MCNC: 3.5 G/DL (ref 3.4–5)
ALBUMIN UR-MCNC: 20 MG/DL
ALP SERPL-CCNC: 47 U/L (ref 40–150)
ALT SERPL W P-5'-P-CCNC: 17 U/L (ref 0–50)
AMORPH CRY #/AREA URNS HPF: ABNORMAL /HPF
ANION GAP SERPL CALCULATED.3IONS-SCNC: 7 MMOL/L (ref 3–14)
APPEARANCE UR: ABNORMAL
AST SERPL W P-5'-P-CCNC: 18 U/L (ref 0–45)
BASOPHILS # BLD AUTO: 0 10E9/L (ref 0–0.2)
BASOPHILS NFR BLD AUTO: 0.2 %
BILIRUB SERPL-MCNC: 0.4 MG/DL (ref 0.2–1.3)
BILIRUB UR QL STRIP: NEGATIVE
BUN SERPL-MCNC: 5 MG/DL (ref 7–30)
CALCIUM SERPL-MCNC: 8.8 MG/DL (ref 8.5–10.1)
CHLORIDE SERPL-SCNC: 102 MMOL/L (ref 94–109)
CO2 SERPL-SCNC: 27 MMOL/L (ref 20–32)
COLOR UR AUTO: YELLOW
CREAT SERPL-MCNC: 0.5 MG/DL (ref 0.52–1.04)
DIFFERENTIAL METHOD BLD: NORMAL
EOSINOPHIL # BLD AUTO: 0 10E9/L (ref 0–0.7)
EOSINOPHIL NFR BLD AUTO: 0 %
ERYTHROCYTE [DISTWIDTH] IN BLOOD BY AUTOMATED COUNT: 12.1 % (ref 10–15)
GFR SERPL CREATININE-BSD FRML MDRD: >90 ML/MIN/{1.73_M2}
GLUCOSE SERPL-MCNC: 100 MG/DL (ref 70–99)
GLUCOSE UR STRIP-MCNC: NEGATIVE MG/DL
HCT VFR BLD AUTO: 36.1 % (ref 35–47)
HGB BLD-MCNC: 12.4 G/DL (ref 11.7–15.7)
HGB UR QL STRIP: NEGATIVE
HYALINE CASTS #/AREA URNS LPF: 1 /LPF (ref 0–2)
IMM GRANULOCYTES # BLD: 0.1 10E9/L (ref 0–0.4)
IMM GRANULOCYTES NFR BLD: 0.6 %
KETONES UR STRIP-MCNC: >150 MG/DL
LEUKOCYTE ESTERASE UR QL STRIP: NEGATIVE
LIPASE SERPL-CCNC: 106 U/L (ref 73–393)
LYMPHOCYTES # BLD AUTO: 2.4 10E9/L (ref 0.8–5.3)
LYMPHOCYTES NFR BLD AUTO: 24.9 %
MAGNESIUM SERPL-MCNC: 1.7 MG/DL (ref 1.6–2.3)
MCH RBC QN AUTO: 31.4 PG (ref 26.5–33)
MCHC RBC AUTO-ENTMCNC: 34.3 G/DL (ref 31.5–36.5)
MCV RBC AUTO: 91 FL (ref 78–100)
MONOCYTES # BLD AUTO: 0.7 10E9/L (ref 0–1.3)
MONOCYTES NFR BLD AUTO: 7.6 %
MUCOUS THREADS #/AREA URNS LPF: PRESENT /LPF
NEUTROPHILS # BLD AUTO: 6.5 10E9/L (ref 1.6–8.3)
NEUTROPHILS NFR BLD AUTO: 66.7 %
NITRATE UR QL: NEGATIVE
NRBC # BLD AUTO: 0 10*3/UL
NRBC BLD AUTO-RTO: 0 /100
PH UR STRIP: 6.5 PH (ref 5–7)
PLATELET # BLD AUTO: 282 10E9/L (ref 150–450)
POTASSIUM SERPL-SCNC: 3.2 MMOL/L (ref 3.4–5.3)
PROT SERPL-MCNC: 7 G/DL (ref 6.8–8.8)
RBC # BLD AUTO: 3.95 10E12/L (ref 3.8–5.2)
RBC #/AREA URNS AUTO: 1 /HPF (ref 0–2)
SODIUM SERPL-SCNC: 136 MMOL/L (ref 133–144)
SOURCE: ABNORMAL
SP GR UR STRIP: 1.02 (ref 1–1.03)
SQUAMOUS #/AREA URNS AUTO: 4 /HPF (ref 0–1)
UROBILINOGEN UR STRIP-MCNC: NORMAL MG/DL (ref 0–2)
WBC # BLD AUTO: 9.7 10E9/L (ref 4–11)
WBC #/AREA URNS AUTO: 8 /HPF (ref 0–5)

## 2020-12-05 PROCEDURE — 85025 COMPLETE CBC W/AUTO DIFF WBC: CPT | Performed by: EMERGENCY MEDICINE

## 2020-12-05 PROCEDURE — 96365 THER/PROPH/DIAG IV INF INIT: CPT

## 2020-12-05 PROCEDURE — 250N000013 HC RX MED GY IP 250 OP 250 PS 637: Performed by: EMERGENCY MEDICINE

## 2020-12-05 PROCEDURE — 96375 TX/PRO/DX INJ NEW DRUG ADDON: CPT

## 2020-12-05 PROCEDURE — 258N000003 HC RX IP 258 OP 636: Performed by: EMERGENCY MEDICINE

## 2020-12-05 PROCEDURE — 96361 HYDRATE IV INFUSION ADD-ON: CPT

## 2020-12-05 PROCEDURE — 87086 URINE CULTURE/COLONY COUNT: CPT | Performed by: EMERGENCY MEDICINE

## 2020-12-05 PROCEDURE — 99284 EMERGENCY DEPT VISIT MOD MDM: CPT | Mod: 25

## 2020-12-05 PROCEDURE — 83690 ASSAY OF LIPASE: CPT | Performed by: EMERGENCY MEDICINE

## 2020-12-05 PROCEDURE — 83735 ASSAY OF MAGNESIUM: CPT | Performed by: EMERGENCY MEDICINE

## 2020-12-05 PROCEDURE — 80053 COMPREHEN METABOLIC PANEL: CPT | Performed by: EMERGENCY MEDICINE

## 2020-12-05 PROCEDURE — 81001 URINALYSIS AUTO W/SCOPE: CPT | Performed by: EMERGENCY MEDICINE

## 2020-12-05 PROCEDURE — 250N000011 HC RX IP 250 OP 636: Performed by: EMERGENCY MEDICINE

## 2020-12-05 RX ORDER — DIPHENHYDRAMINE HYDROCHLORIDE 50 MG/ML
25 INJECTION INTRAMUSCULAR; INTRAVENOUS ONCE
Status: COMPLETED | OUTPATIENT
Start: 2020-12-05 | End: 2020-12-05

## 2020-12-05 RX ORDER — ONDANSETRON 4 MG/1
4 TABLET, ORALLY DISINTEGRATING ORAL ONCE
Status: COMPLETED | OUTPATIENT
Start: 2020-12-05 | End: 2020-12-05

## 2020-12-05 RX ORDER — SODIUM CHLORIDE 9 MG/ML
INJECTION, SOLUTION INTRAVENOUS CONTINUOUS
Status: DISCONTINUED | OUTPATIENT
Start: 2020-12-05 | End: 2020-12-05 | Stop reason: HOSPADM

## 2020-12-05 RX ORDER — POTASSIUM CHLORIDE 1500 MG/1
40 TABLET, EXTENDED RELEASE ORAL ONCE
Status: COMPLETED | OUTPATIENT
Start: 2020-12-05 | End: 2020-12-05

## 2020-12-05 RX ORDER — METOCLOPRAMIDE HYDROCHLORIDE 5 MG/ML
10 INJECTION INTRAMUSCULAR; INTRAVENOUS ONCE
Status: COMPLETED | OUTPATIENT
Start: 2020-12-05 | End: 2020-12-05

## 2020-12-05 RX ADMIN — SODIUM CHLORIDE 1000 ML: 9 INJECTION, SOLUTION INTRAVENOUS at 09:45

## 2020-12-05 RX ADMIN — DEXTROSE AND SODIUM CHLORIDE: 5; 900 INJECTION, SOLUTION INTRAVENOUS at 10:01

## 2020-12-05 RX ADMIN — DIPHENHYDRAMINE HYDROCHLORIDE 25 MG: 50 INJECTION, SOLUTION INTRAMUSCULAR; INTRAVENOUS at 09:45

## 2020-12-05 RX ADMIN — POTASSIUM CHLORIDE 40 MEQ: 1500 TABLET, EXTENDED RELEASE ORAL at 10:34

## 2020-12-05 RX ADMIN — METOCLOPRAMIDE HYDROCHLORIDE 10 MG: 5 INJECTION INTRAMUSCULAR; INTRAVENOUS at 09:50

## 2020-12-05 RX ADMIN — ONDANSETRON 4 MG: 4 TABLET, ORALLY DISINTEGRATING ORAL at 12:00

## 2020-12-05 NOTE — DISCHARGE INSTRUCTIONS
Follow-up as planned next week.  Return to the ER if having worsening pain, intractable vomiting, diarrhea, unable to eat or drink, bleeding or other acute concerns.  Use your medications as prescribed.    Discharge Instructions  Hyperemesis Gravidarum    You were seen today for hyperemesis gravidarum. It is very common to have some nausea (sick to your stomach) and vomiting (throwing up) in early pregnancy (sometimes called  morning sickness,  although it happens at all times of day.) In hyperemesis gravidarum, however, the vomiting is much more severe, so you may become dehydrated and lose weight. We have treated you today to manage your symptoms and rehydrate you. Often these symptoms persist during the first trimester of pregnancy before improving so you will likely need ongoing care.    Generally, every Emergency Department visit should have a follow-up clinic visit with either a primary or a specialty clinic/provider. Please follow-up as instructed by your emergency provider today.    Return to the Emergency Department if:  You feel dizzy or light headed when standing up.  You are vomiting and cannot keep fluids or medications down.  You urinate (pee) much less than normal.  You feel much more ill or develop new symptoms.    What can I do to help myself?  Be sure to drink plenty of cold clear fluids and suck on popsicles between meals. Pedialyte is a good rehydration liquid but many people don t like the taste so sport drinks are a good alternative (Gatorade). Soda is often excessively sweat but Ginger Ale is sometimes recommended for this condition.  Eat as soon as you feel hungry, or even before you feel hungry. Try to keep something on your stomach.   Avoid strong smells, or other things that make you feel nauseated.  Snack often and eat small meals high in protein or carbohydrates such as crackers, bread, nuts, and low-fat yogurt. Avoid spicy, greasy, or acid foods.  Avoid lying down right after you  eat.  Take your prenatal vitamins at bedtime with a snack instead of taking them in the morning.  Radha may make you feel better. Try eating a small piece of radha, or having radha-flavored hard candies.  Acupressure wrist bands are also helpful to some people.    Treatment:  Nausea medication. Your provider may send you home with a prescription medicine, like Compazine  (prochlorperazine) or Reglan  (metoclopramide). Zofran  (ondansetron) is sometimes used as well.  Your provider may recommend that you take non-prescription nausea medicines, like Dramamine  (dimenhydrinate), Unisom  (doxylamine), or Vitamin B6 (pyridoxine).  Vitamins. Make sure your prenatal vitamins have 400 micrograms of Folic acid and have vitamin B6, since this may help your nausea and vomiting.   If you were given a prescription for medicine here today, be sure to read all of the information (including the package insert) that comes with your prescription.  This will include important information about the medicine, its side effects, and any warnings that you need to know about.  The pharmacist who fills the prescription can provide more information and answer questions you may have about the medicine.  If you have questions or concerns that the pharmacist cannot address, please call or return to the Emergency Department.    Remember that you can always come back to the Emergency Department if you are not able to see your regular provider in the amount of time listed above, if you get any new symptoms, or if there is anything that worries you.

## 2020-12-05 NOTE — ED PROVIDER NOTES
History     Chief Complaint:  Abdominal Pain       The history is provided by the patient.     Qian Loo is a 25 year old female, currently 11 weeks pregnant (A3),  with a history of depression and suicide attempt among others who presents for evaluation of abdominal pain. The patient repots that she has been having extensive abdominal cramping which radiates up her chest and her back. She has also been unable to tolerate any liquids or solids due to subsequent emesis. The patient has taken Reglan, Zofran, and Tylenol without relief of symptoms with last doses at 2200 last night, 0700 this morning, and 0730 this morning respectively. She has been using marijuana for the past two days for attempted relief of pain. Here, she denies vaginal bleeding, discharge, burning dysuria, cough, chest pain, fevers, chills, difficulty breathing, or suicidal ideation. Of note, she was seen yesterday at [facility] for similar symptoms and laboratory and imaging workup was obtained including ultrasound, results below. The patient has an appointment with [facility] in four days.    US OB 1st TRI Single TA from 2020:   IMPRESSION:  1. Single viable intrauterine pregnancy with an estimated gestational age of 11 weeks 4 days based on current ultrasound with corresponding ultrasound LAYO of 2021, within 2 days of clinical dating.   2. No perigestational hemorrhage.  Reading per radiology.    Allergies:  No Known Drug Allergies      Medications:   Unisom  Metoclopramide  Zofran   Potassium chloride  Prenatal vitamin  Promethazine  Vitamin B6    Medical History:   Anxiety   Hyperemesis  Depression   Bulimia  Suicide attempt   Suicidal ideation    Surgical History   [place] Teeth Extraction     Family History:   History reviewed. No pertinent family history.       Social History:  The patient was unaccompanied to the ED.  Smoking Status: Never   Smokeless Tobacco: Never   Alcohol Use: Yes  Drug Use: Yes -  "marijuana   Marital Status: Single  PCP: Lora Garcia        Review of Systems   All other systems reviewed and are negative.      Physical Exam     Patient Vitals for the past 24 hrs:   BP Temp Temp src Pulse Resp SpO2 Height   12/05/20 0906 (!) 120/94 97  F (36.1  C) Temporal 61 18 98 % 1.6 m (5' 3\")          Physical Exam  General: Resting on the bed.  Head: No obvious trauma to head.  Ears, Nose, Throat:  External ears normal.  Nose normal.    Eyes:  Conjunctivae clear.  Pupils are equal, round, and reactive.   Neck: Normal range of motion.  Neck supple.   CV: Regular rate and rhythm.  No murmurs.      Respiratory: Effort normal and breath sounds normal.  No wheezing or crackles.   Gastrointestinal: Soft.  No distension. There is mild diffuse tenderness.  There is no rigidity, no rebound and no guarding.   Musculoskeletal: No cva tenderness   Neuro: Alert. Moving all extremities appropriately.  Normal speech.    Skin: Skin is warm and dry.  No rash noted.       Emergency Department Course     Laboratory:  Laboratory findings were communicated with the patient who voiced understanding of the findings.    CBC: WBC 9.7, HGB 12.4,   CMP: Potassium 3.2 (L), Glucose 100 (H), BUN 5 (L), Creatinine 0.50 (L), o/w WNL   Lipase: 106  Magnesium: 1.7     UA with Microscopic: Ketone >150 (A), Protein Albumin 20 (A), WBC/HPF 8 (H), Squamous Epithelial 4 (H), Mucous present (A), Amorphous Crystals few (A), o/w WNL   Urine Culture: Pending    Interventions:   0945 Normal Saline 1000 mL IV   0945 Benadryl 25 mg IV  0950 Reglan 10 mg IV  1001 Dextrose 5% and 0.9% NaCl infusion IV  1034 Potassium Chloride 40 mEq PO     Emergency Department Course:    Nursing notes and vitals reviewed.    0920 I performed an exam of the patient as documented above.     IV was inserted and blood was drawn for laboratory testing, results above.    The patient provided a urine sample here in the emergency department. This was sent for " "laboratory testing, findings above.     1020 Patient rechecked and updated on laboratory results.     Patient was able to pass a \"PO Challenge\" here in the ED prior to discharge.     1139 Patient rechecked and updated. She is feeling much improved and feels that she has enough medications at home at this time. Findings and plan explained to the Patient. Patient discharged home with instructions regarding supportive care, medications, and reasons to return. The importance of close follow-up was reviewed.      Impression & Plan     Medical Decision Making:  Qian Loo is a 25 year old female who presents for evaluation of nausea, vomiting, and abdominal pain.  She has been unable to tolerate oral intake. She is currently 11 weeks pregnant.  Nonetheless, I considered a broad differential diagnosis for this patient including viral gastroenteritis, food poisoning, bowel obstruction, intra-abdominal infection such as colitis, cholecystitis, UTI, pyelonephritis, appendicitis, etc. CBC shows no leukocytosis or anemia.  BMP shows no acute electrolyte, metabolic or renal dysfunction.  LFTs and lipase are reassuring, not concerning for obstructive biliary process, hepatitis, pancreatitis.  No focal right upper quadrant tenderness to indicate cholecystitis.  No focal right lower quadrant tenderness to indicate appendicitis.  Magnesium levels normal despite vomiting.  UA looks unrevealing although urine culture is pending given that she is pregnant.  Her symptoms improved with fluids and antiemetics.  There are no signs of worrisome intra-abdominal pathologies detected during the visit today.  The patient has a completely benign abdominal exam without rebound, guarding, or marked tenderness to palpation.  This is not an ectopic pregnancy based on previous US from yesterday showing an IUP.    Supportive outpatient management is therefore indicated.  Abdominal pain precautions are given for home and given that she has " previously been seen multiple times for this issue, she does not have a need for prescription refills at this time.    It was discussed with the patient to return to the ED for blood in stool, increasing pain, or fevers more than 102.  Feels much improved after interventions in ED and was able to tolerate PO prior to discharge.  She did have 1 more episode of vomiting after she was being discharged, she wanted p.o. Zofran and to discharge regardless.  She was feeling much better.  I believe all of her symptoms are at this point explained by the pregnancy and hyperemesis gravidarum.      Diagnosis:     ICD-10-CM    1. Generalized abdominal pain  R10.84    2. Hyperemesis gravidarum  O21.0       Disposition:  Discharged to home.    Scribe Disclosure:  I, Ania Gallegos, am serving as a scribe at 9:14 AM on 12/5/2020 to document services personally performed by Gemma Corey MD based on my observations and the provider's statements to me.      Gemma Corey MD  12/05/20 0861

## 2020-12-05 NOTE — ED NOTES
Patient vomited. She wants to go home. MD notified, who states that we should give 4mg zofran ODT. Patient is agreeable.

## 2020-12-05 NOTE — ED TRIAGE NOTES
ABCs intact. Pt is about 11 weeks pregnant. Pt c/o abdominal pain for over a week. Pt was seen on ,  and . Denies vaginal bleeding or discharge. Pt expresses wanting an . Pt was also seen by mental health yesterday at [facility]. Pt states she has no thoughts of self harm today. Denies SI.

## 2020-12-05 NOTE — ED AVS SNAPSHOT
[facility] Emergency Dept  [address]  [facility] [address]  Phone: [phone]  Fax: [phone]                                    Qian Loo   MRN: [id number]    Department: [facility] Emergency Dept   Date of Visit: 12/5/2020           After Visit Summary Signature Page    I have received my discharge instructions, and my questions have been answered. I have discussed any challenges I see with this plan with the nurse or doctor.    ..........................................................................................................................................  Patient/Patient Representative Signature      ..........................................................................................................................................  Patient Representative Print Name and Relationship to Patient    ..................................................               ................................................  Date                                   Time    ..........................................................................................................................................  Reviewed by Signature/Title    ...................................................              ..............................................  Date                                               Time          22EPIC Rev 08/18

## 2020-12-06 LAB
BACTERIA SPEC CULT: NO GROWTH
Lab: NORMAL
SPECIMEN SOURCE: NORMAL

## 2020-12-07 NOTE — RESULT ENCOUNTER NOTE
Final urine culture report is NEGATIVE per [facility] ED Lab Result protocol.    If NEGATIVE result, no change in treatment, per [facility] ED Lab Result protocol.

## 2020-12-20 ENCOUNTER — HOSPITAL ENCOUNTER (EMERGENCY)
Facility: CLINIC | Age: 25
Discharge: HOME | End: 2020-12-21
Attending: EMERGENCY MEDICINE | Admitting: EMERGENCY MEDICINE
Payer: COMMERCIAL

## 2020-12-20 DIAGNOSIS — R82.71 ANTEPARTUM ASYMPTOMATIC BACTERIURIA: ICD-10-CM

## 2020-12-20 DIAGNOSIS — O99.891 ANTEPARTUM ASYMPTOMATIC BACTERIURIA: ICD-10-CM

## 2020-12-20 DIAGNOSIS — O21.9 NAUSEA AND VOMITING DURING PREGNANCY: ICD-10-CM

## 2020-12-20 PROCEDURE — 96374 THER/PROPH/DIAG INJ IV PUSH: CPT

## 2020-12-20 PROCEDURE — 81015 MICROSCOPIC EXAM OF URINE: CPT | Performed by: EMERGENCY MEDICINE

## 2020-12-20 PROCEDURE — 250N000013 HC RX MED GY IP 250 OP 250 PS 637: Performed by: EMERGENCY MEDICINE

## 2020-12-20 PROCEDURE — 96375 TX/PRO/DX INJ NEW DRUG ADDON: CPT

## 2020-12-20 PROCEDURE — 96361 HYDRATE IV INFUSION ADD-ON: CPT

## 2020-12-20 PROCEDURE — 81003 URINALYSIS AUTO W/O SCOPE: CPT | Performed by: EMERGENCY MEDICINE

## 2020-12-20 PROCEDURE — 99284 EMERGENCY DEPT VISIT MOD MDM: CPT | Mod: 25

## 2020-12-20 RX ORDER — ACETAMINOPHEN 500 MG
1000 TABLET ORAL ONCE
Status: COMPLETED | OUTPATIENT
Start: 2020-12-20 | End: 2020-12-20

## 2020-12-20 RX ORDER — METOCLOPRAMIDE HYDROCHLORIDE 5 MG/ML
10 INJECTION INTRAMUSCULAR; INTRAVENOUS ONCE
Status: COMPLETED | OUTPATIENT
Start: 2020-12-20 | End: 2020-12-21

## 2020-12-20 RX ADMIN — ACETAMINOPHEN 1000 MG: 500 TABLET, FILM COATED ORAL at 23:56

## 2020-12-20 ASSESSMENT — ENCOUNTER SYMPTOMS
ABDOMINAL PAIN: 1
VOMITING: 1
NAUSEA: 1
DYSURIA: 0

## 2020-12-20 NOTE — ED AVS SNAPSHOT
[facility] Emergency Dept  [address]  [facility] [address]  Phone: [phone]  Fax: [phone]                                    Qian Loo   MRN: [id number]    Department: [facility] Emergency Dept   Date of Visit: 12/20/2020           After Visit Summary Signature Page    I have received my discharge instructions, and my questions have been answered. I have discussed any challenges I see with this plan with the nurse or doctor.    ..........................................................................................................................................  Patient/Patient Representative Signature      ..........................................................................................................................................  Patient Representative Print Name and Relationship to Patient    ..................................................               ................................................  Date                                   Time    ..........................................................................................................................................  Reviewed by Signature/Title    ...................................................              ..............................................  Date                                               Time          22EPIC Rev 08/18

## 2020-12-20 NOTE — LETTER
December 21, 2020      To Whom It May Concern:      Qian Loo was seen in our Emergency Department today, 12/21/20.  I expect her condition to improve over the next 2-3 days.  She may return to work when improved.    Sincerely,        Ean PICKERING RN

## 2020-12-21 VITALS
SYSTOLIC BLOOD PRESSURE: 109 MMHG | RESPIRATION RATE: 18 BRPM | TEMPERATURE: 97.7 F | DIASTOLIC BLOOD PRESSURE: 78 MMHG | OXYGEN SATURATION: 100 % | HEART RATE: 122 BPM

## 2020-12-21 LAB
ALBUMIN SERPL-MCNC: 3.6 G/DL (ref 3.4–5)
ALBUMIN UR-MCNC: 10 MG/DL
ALP SERPL-CCNC: 48 U/L (ref 40–150)
ALT SERPL W P-5'-P-CCNC: 15 U/L (ref 0–50)
ANION GAP SERPL CALCULATED.3IONS-SCNC: 5 MMOL/L (ref 3–14)
APPEARANCE UR: ABNORMAL
AST SERPL W P-5'-P-CCNC: 14 U/L (ref 0–45)
BACTERIA #/AREA URNS HPF: ABNORMAL /HPF
BASOPHILS # BLD AUTO: 0 10E9/L (ref 0–0.2)
BASOPHILS NFR BLD AUTO: 0.2 %
BILIRUB SERPL-MCNC: 0.4 MG/DL (ref 0.2–1.3)
BILIRUB UR QL STRIP: NEGATIVE
BUN SERPL-MCNC: 6 MG/DL (ref 7–30)
CALCIUM SERPL-MCNC: 9.1 MG/DL (ref 8.5–10.1)
CHLORIDE SERPL-SCNC: 107 MMOL/L (ref 94–109)
CO2 SERPL-SCNC: 25 MMOL/L (ref 20–32)
COLOR UR AUTO: YELLOW
CREAT SERPL-MCNC: 0.41 MG/DL (ref 0.52–1.04)
DIFFERENTIAL METHOD BLD: ABNORMAL
EOSINOPHIL # BLD AUTO: 0 10E9/L (ref 0–0.7)
EOSINOPHIL NFR BLD AUTO: 0.3 %
ERYTHROCYTE [DISTWIDTH] IN BLOOD BY AUTOMATED COUNT: 13.7 % (ref 10–15)
GFR SERPL CREATININE-BSD FRML MDRD: >90 ML/MIN/{1.73_M2}
GLUCOSE SERPL-MCNC: 106 MG/DL (ref 70–99)
GLUCOSE UR STRIP-MCNC: NEGATIVE MG/DL
HCT VFR BLD AUTO: 37 % (ref 35–47)
HGB BLD-MCNC: 12.5 G/DL (ref 11.7–15.7)
HGB UR QL STRIP: NEGATIVE
IMM GRANULOCYTES # BLD: 0.1 10E9/L (ref 0–0.4)
IMM GRANULOCYTES NFR BLD: 0.5 %
KETONES UR STRIP-MCNC: 150 MG/DL
LEUKOCYTE ESTERASE UR QL STRIP: ABNORMAL
LYMPHOCYTES # BLD AUTO: 2.1 10E9/L (ref 0.8–5.3)
LYMPHOCYTES NFR BLD AUTO: 18.1 %
MCH RBC QN AUTO: 32.7 PG (ref 26.5–33)
MCHC RBC AUTO-ENTMCNC: 33.8 G/DL (ref 31.5–36.5)
MCV RBC AUTO: 97 FL (ref 78–100)
MONOCYTES # BLD AUTO: 0.7 10E9/L (ref 0–1.3)
MONOCYTES NFR BLD AUTO: 6.1 %
MUCOUS THREADS #/AREA URNS LPF: PRESENT /LPF
NEUTROPHILS # BLD AUTO: 8.8 10E9/L (ref 1.6–8.3)
NEUTROPHILS NFR BLD AUTO: 74.8 %
NITRATE UR QL: NEGATIVE
NRBC # BLD AUTO: 0 10*3/UL
NRBC BLD AUTO-RTO: 0 /100
PH UR STRIP: 6 PH (ref 5–7)
PLATELET # BLD AUTO: 288 10E9/L (ref 150–450)
POTASSIUM SERPL-SCNC: 3.6 MMOL/L (ref 3.4–5.3)
PROT SERPL-MCNC: 7.2 G/DL (ref 6.8–8.8)
RBC # BLD AUTO: 3.82 10E12/L (ref 3.8–5.2)
RBC #/AREA URNS AUTO: 2 /HPF (ref 0–2)
SODIUM SERPL-SCNC: 137 MMOL/L (ref 133–144)
SOURCE: ABNORMAL
SP GR UR STRIP: 1.02 (ref 1–1.03)
SQUAMOUS #/AREA URNS AUTO: 9 /HPF (ref 0–1)
UROBILINOGEN UR STRIP-MCNC: NORMAL MG/DL (ref 0–2)
WBC # BLD AUTO: 11.8 10E9/L (ref 4–11)
WBC #/AREA URNS AUTO: 5 /HPF (ref 0–5)

## 2020-12-21 PROCEDURE — 258N000003 HC RX IP 258 OP 636: Performed by: EMERGENCY MEDICINE

## 2020-12-21 PROCEDURE — 250N000011 HC RX IP 250 OP 636: Performed by: EMERGENCY MEDICINE

## 2020-12-21 PROCEDURE — 85025 COMPLETE CBC W/AUTO DIFF WBC: CPT | Performed by: EMERGENCY MEDICINE

## 2020-12-21 PROCEDURE — 80053 COMPREHEN METABOLIC PANEL: CPT | Performed by: EMERGENCY MEDICINE

## 2020-12-21 RX ORDER — NITROFURANTOIN 25; 75 MG/1; MG/1
100 CAPSULE ORAL 2 TIMES DAILY
Qty: 10 CAPSULE | Refills: 0 | Status: SHIPPED | OUTPATIENT
Start: 2020-12-21 | End: 2020-12-26

## 2020-12-21 RX ORDER — ONDANSETRON 4 MG/1
4 TABLET, ORALLY DISINTEGRATING ORAL EVERY 8 HOURS PRN
Qty: 10 TABLET | Refills: 0 | Status: SHIPPED | OUTPATIENT
Start: 2020-12-21

## 2020-12-21 RX ORDER — ONDANSETRON 2 MG/ML
4 INJECTION INTRAMUSCULAR; INTRAVENOUS ONCE
Status: COMPLETED | OUTPATIENT
Start: 2020-12-21 | End: 2020-12-21

## 2020-12-21 RX ADMIN — METOCLOPRAMIDE HYDROCHLORIDE 10 MG: 5 INJECTION INTRAMUSCULAR; INTRAVENOUS at 00:02

## 2020-12-21 RX ADMIN — SODIUM CHLORIDE 1000 ML: 9 INJECTION, SOLUTION INTRAVENOUS at 00:07

## 2020-12-21 RX ADMIN — ONDANSETRON 4 MG: 2 INJECTION INTRAMUSCULAR; INTRAVENOUS at 01:23

## 2020-12-21 NOTE — ED PROVIDER NOTES
History     Chief Complaint:  Abdominal Pain and Nausea & Vomiting      HPI  Qian Loo is a 25 year old female, 13 weeks pregnant with a history of hyperemesis who presents to the emergency department for evaluation of abdominal pain, nausea, and vomiting. Patient reports having lower abdominal cramps starting today. She states she has been unable to keep any food or fluids down. She had similar symptoms a week and a half ago and had been doing well until today. She states these symptoms have been a trend for her during her pregnancy and on chart review she has had multiple similar presentations to the ED. She tried taking Reglan to no relief. She denies vaginal bleeding, vaginal discharge, and dysuria.   States that her symptoms tonight are typical for her and similar in severity to prior episodes which have prompted her to come to the ED.     Allergies:  No known drug allergies    Medications:    Unisom  Etonogestrel  Reglan  Potassium chloride  Prenatal vitamins  Phenergan  Vitamin B6    Past Medical History:    Anxiety  Hyperemesis  Depression  Suicide attempt by drug overdose    Social History:  The patient presents to the emergency department by herself.  Smoking Status: No    Review of Systems   Gastrointestinal: Positive for abdominal pain, nausea and vomiting.   Genitourinary: Negative for dysuria, vaginal bleeding and vaginal discharge.   All other systems reviewed and are negative.    Physical Exam     Patient Vitals for the past 24 hrs:   BP Temp Temp src Pulse Resp SpO2   12/21/20 0015 109/78 -- -- -- 18 100 %   12/20/20 2242 101/72 97.7  F (36.5  C) Temporal 122 16 95 %         Physical Exam  I have reviewed the triage vital signs    Eyes: No discharge, symmetrical lids  ENT: Moist mucous membranes, no ear discharge  Neck: Full range of motion  Respiratory: CTAB, no wheezes  Cardiovascular: Tachycardic, no lower extremity edema  Chest: Equal rise  Gastrointestinal: Soft. Nondistended. Mild  diffuse nonfocal TTP. No rebound or guarding  Musculoskeletal: No gross deformities.   Skin: Warm and well perfused. No visible rash.  Neurologic: Moves all extremities, speech fluent without dysarthria  Psychiatric: Appropriate affect, alert and interactive      Emergency Department Course   Laboratory:  CBC: WBC: 11.8 (H), HGB: 12.5, PLT: 288  CMP: Glucose 106 (H), Urea Nitrogen: 6 (L), o/w WNL (Creatinine: 0.41 (L))  UA: Ketones: 150, Protein Albumin: 10, Leukocyte Esterase: Trace, Bacteria: Few, Squamous Epithelial: 9 (H), Mucous: Present, o/w Negative      Emergency Department Course:  Reviewed:  I reviewed the patient's nursing notes, vitals, past medical records, Care Everywhere.     Assessments:  2333 I assessed the patient. Exam findings described above.    115 I reassessed the patient and discussed the results of her workup.    Interventions:  2356 Tylenol 1000 mg Oral  0002 Reglan 10 mg IV  0007 NS 1000 mL IV    Disposition:  Discharged to home.    Impression & Plan        Medical Decision MakinF 13 weeks GA presenting with N/V and abdominal cramping.  DDx includes but is not limited to hyperemesis gravidarum, electrolyte abnormality, bacteruria of pregnancy, UTI, AIDAN  Labs obtained, as above, reassuring.  Mild leukocytosis but not unexpected given history of vomiting.  UA notable for bacteruria; will treat with macrobid.  Feeling significantly improved after reglan and IVF.  Requests zofran ODT; advised her of possible weak association with cardiac abnormalities and advised taking sparingly if at all.  Advised close follow-up with OB.  Strict return precautions given.        Diagnosis:    ICD-10-CM    1. Antepartum asymptomatic bacteriuria  O99.891 CBC with platelets differential    R82.71 Comprehensive metabolic panel   2. Nausea and vomiting during pregnancy  O21.9        Disposition:  Discharged to home    Discharge Medications:  Discharge Medication List as of 2020  1:15 AM      START  taking these medications    Details   nitroFURantoin macrocrystal-monohydrate (MACROBID) 100 MG capsule Take 1 capsule (100 mg) by mouth 2 times daily for 5 days, Disp-10 capsule, R-0, Local Print               Valeriano Kelly  12/20/2020   EMERGENCY DEPARTMENT  Scribe Disclosure:  I, Valeriano Kelly, am serving as a scribe at 11:33 PM on 12/20/2020 to document services personally performed by Irineo Kearns MD based on my observations and the provider's statements to me.          Irineo Kearns MD  12/21/20 0142

## 2020-12-27 ENCOUNTER — HOSPITAL ENCOUNTER (EMERGENCY)
Facility: CLINIC | Age: 25
Discharge: HOME | End: 2020-12-27
Attending: EMERGENCY MEDICINE | Admitting: EMERGENCY MEDICINE
Payer: COMMERCIAL

## 2020-12-27 VITALS
HEART RATE: 93 BPM | DIASTOLIC BLOOD PRESSURE: 79 MMHG | RESPIRATION RATE: 24 BRPM | OXYGEN SATURATION: 100 % | TEMPERATURE: 97.7 F | SYSTOLIC BLOOD PRESSURE: 123 MMHG

## 2020-12-27 VITALS
DIASTOLIC BLOOD PRESSURE: 73 MMHG | RESPIRATION RATE: 18 BRPM | HEART RATE: 81 BPM | OXYGEN SATURATION: 98 % | TEMPERATURE: 98 F | SYSTOLIC BLOOD PRESSURE: 103 MMHG

## 2020-12-27 DIAGNOSIS — O21.9 NAUSEA AND VOMITING DURING PREGNANCY: ICD-10-CM

## 2020-12-27 DIAGNOSIS — O21.0 HYPEREMESIS GRAVIDARUM: ICD-10-CM

## 2020-12-27 LAB
ALBUMIN SERPL-MCNC: 3.6 G/DL (ref 3.4–5)
ALBUMIN SERPL-MCNC: 3.8 G/DL (ref 3.4–5)
ALBUMIN UR-MCNC: 30 MG/DL
ALP SERPL-CCNC: 51 U/L (ref 40–150)
ALP SERPL-CCNC: 53 U/L (ref 40–150)
ALT SERPL W P-5'-P-CCNC: 17 U/L (ref 0–50)
ALT SERPL W P-5'-P-CCNC: 20 U/L (ref 0–50)
AMPHETAMINES UR QL SCN: NEGATIVE
ANION GAP SERPL CALCULATED.3IONS-SCNC: 5 MMOL/L (ref 3–14)
ANION GAP SERPL CALCULATED.3IONS-SCNC: 9 MMOL/L (ref 3–14)
APPEARANCE UR: CLEAR
AST SERPL W P-5'-P-CCNC: 13 U/L (ref 0–45)
AST SERPL W P-5'-P-CCNC: 15 U/L (ref 0–45)
BARBITURATES UR QL: NEGATIVE
BASOPHILS # BLD AUTO: 0 10E9/L (ref 0–0.2)
BASOPHILS # BLD AUTO: 0 10E9/L (ref 0–0.2)
BASOPHILS NFR BLD AUTO: 0.1 %
BASOPHILS NFR BLD AUTO: 0.3 %
BENZODIAZ UR QL: NEGATIVE
BILIRUB SERPL-MCNC: 0.3 MG/DL (ref 0.2–1.3)
BILIRUB SERPL-MCNC: 0.3 MG/DL (ref 0.2–1.3)
BILIRUB UR QL STRIP: NEGATIVE
BUN SERPL-MCNC: 5 MG/DL (ref 7–30)
BUN SERPL-MCNC: 6 MG/DL (ref 7–30)
CALCIUM SERPL-MCNC: 8.9 MG/DL (ref 8.5–10.1)
CALCIUM SERPL-MCNC: 9.5 MG/DL (ref 8.5–10.1)
CANNABINOIDS UR QL SCN: POSITIVE
CHLORIDE SERPL-SCNC: 104 MMOL/L (ref 94–109)
CHLORIDE SERPL-SCNC: 106 MMOL/L (ref 94–109)
CO2 SERPL-SCNC: 23 MMOL/L (ref 20–32)
CO2 SERPL-SCNC: 24 MMOL/L (ref 20–32)
COCAINE UR QL: NEGATIVE
COLOR UR AUTO: YELLOW
CREAT SERPL-MCNC: 0.4 MG/DL (ref 0.52–1.04)
CREAT SERPL-MCNC: 0.4 MG/DL (ref 0.52–1.04)
DIFFERENTIAL METHOD BLD: ABNORMAL
DIFFERENTIAL METHOD BLD: NORMAL
EOSINOPHIL # BLD AUTO: 0 10E9/L (ref 0–0.7)
EOSINOPHIL # BLD AUTO: 0 10E9/L (ref 0–0.7)
EOSINOPHIL NFR BLD AUTO: 0 %
EOSINOPHIL NFR BLD AUTO: 0.3 %
ERYTHROCYTE [DISTWIDTH] IN BLOOD BY AUTOMATED COUNT: 13.8 % (ref 10–15)
ERYTHROCYTE [DISTWIDTH] IN BLOOD BY AUTOMATED COUNT: 13.8 % (ref 10–15)
GFR SERPL CREATININE-BSD FRML MDRD: >90 ML/MIN/{1.73_M2}
GFR SERPL CREATININE-BSD FRML MDRD: >90 ML/MIN/{1.73_M2}
GLUCOSE SERPL-MCNC: 116 MG/DL (ref 70–99)
GLUCOSE SERPL-MCNC: 151 MG/DL (ref 70–99)
GLUCOSE UR STRIP-MCNC: >1000 MG/DL
HCT VFR BLD AUTO: 37.3 % (ref 35–47)
HCT VFR BLD AUTO: 37.6 % (ref 35–47)
HGB BLD-MCNC: 12.5 G/DL (ref 11.7–15.7)
HGB BLD-MCNC: 12.8 G/DL (ref 11.7–15.7)
HGB UR QL STRIP: NEGATIVE
IMM GRANULOCYTES # BLD: 0 10E9/L (ref 0–0.4)
IMM GRANULOCYTES # BLD: 0.1 10E9/L (ref 0–0.4)
IMM GRANULOCYTES NFR BLD: 0.4 %
IMM GRANULOCYTES NFR BLD: 0.5 %
KETONES UR STRIP-MCNC: >150 MG/DL
LEUKOCYTE ESTERASE UR QL STRIP: NEGATIVE
LIPASE SERPL-CCNC: 105 U/L (ref 73–393)
LIPASE SERPL-CCNC: 98 U/L (ref 73–393)
LYMPHOCYTES # BLD AUTO: 1 10E9/L (ref 0.8–5.3)
LYMPHOCYTES # BLD AUTO: 2 10E9/L (ref 0.8–5.3)
LYMPHOCYTES NFR BLD AUTO: 18.7 %
LYMPHOCYTES NFR BLD AUTO: 6.3 %
MCH RBC QN AUTO: 32.1 PG (ref 26.5–33)
MCH RBC QN AUTO: 32.7 PG (ref 26.5–33)
MCHC RBC AUTO-ENTMCNC: 33.5 G/DL (ref 31.5–36.5)
MCHC RBC AUTO-ENTMCNC: 34 G/DL (ref 31.5–36.5)
MCV RBC AUTO: 96 FL (ref 78–100)
MCV RBC AUTO: 96 FL (ref 78–100)
MONOCYTES # BLD AUTO: 0.2 10E9/L (ref 0–1.3)
MONOCYTES # BLD AUTO: 0.5 10E9/L (ref 0–1.3)
MONOCYTES NFR BLD AUTO: 1.2 %
MONOCYTES NFR BLD AUTO: 4.7 %
MUCOUS THREADS #/AREA URNS LPF: PRESENT /LPF
NEUTROPHILS # BLD AUTO: 14 10E9/L (ref 1.6–8.3)
NEUTROPHILS # BLD AUTO: 8.1 10E9/L (ref 1.6–8.3)
NEUTROPHILS NFR BLD AUTO: 75.6 %
NEUTROPHILS NFR BLD AUTO: 91.9 %
NITRATE UR QL: NEGATIVE
NRBC # BLD AUTO: 0 10*3/UL
NRBC # BLD AUTO: 0 10*3/UL
NRBC BLD AUTO-RTO: 0 /100
NRBC BLD AUTO-RTO: 0 /100
OPIATES UR QL SCN: NEGATIVE
PCP UR QL SCN: NEGATIVE
PH UR STRIP: 6 PH (ref 5–7)
PLATELET # BLD AUTO: 319 10E9/L (ref 150–450)
PLATELET # BLD AUTO: 355 10E9/L (ref 150–450)
POTASSIUM SERPL-SCNC: 3.3 MMOL/L (ref 3.4–5.3)
POTASSIUM SERPL-SCNC: 3.5 MMOL/L (ref 3.4–5.3)
PROT SERPL-MCNC: 7.4 G/DL (ref 6.8–8.8)
PROT SERPL-MCNC: 7.7 G/DL (ref 6.8–8.8)
RBC # BLD AUTO: 3.9 10E12/L (ref 3.8–5.2)
RBC # BLD AUTO: 3.92 10E12/L (ref 3.8–5.2)
RBC #/AREA URNS AUTO: 2 /HPF (ref 0–2)
SODIUM SERPL-SCNC: 135 MMOL/L (ref 133–144)
SODIUM SERPL-SCNC: 136 MMOL/L (ref 133–144)
SOURCE: ABNORMAL
SP GR UR STRIP: 1.03 (ref 1–1.03)
SQUAMOUS #/AREA URNS AUTO: 3 /HPF (ref 0–1)
UROBILINOGEN UR STRIP-MCNC: NORMAL MG/DL (ref 0–2)
WBC # BLD AUTO: 10.7 10E9/L (ref 4–11)
WBC # BLD AUTO: 15.2 10E9/L (ref 4–11)
WBC #/AREA URNS AUTO: 3 /HPF (ref 0–5)

## 2020-12-27 PROCEDURE — 85025 COMPLETE CBC W/AUTO DIFF WBC: CPT | Mod: 91 | Performed by: EMERGENCY MEDICINE

## 2020-12-27 PROCEDURE — 80307 DRUG TEST PRSMV CHEM ANLYZR: CPT | Performed by: EMERGENCY MEDICINE

## 2020-12-27 PROCEDURE — 250N000013 HC RX MED GY IP 250 OP 250 PS 637: Performed by: EMERGENCY MEDICINE

## 2020-12-27 PROCEDURE — 81001 URINALYSIS AUTO W/SCOPE: CPT | Performed by: EMERGENCY MEDICINE

## 2020-12-27 PROCEDURE — 96374 THER/PROPH/DIAG INJ IV PUSH: CPT | Mod: 59

## 2020-12-27 PROCEDURE — 83690 ASSAY OF LIPASE: CPT | Performed by: EMERGENCY MEDICINE

## 2020-12-27 PROCEDURE — 99284 EMERGENCY DEPT VISIT MOD MDM: CPT | Mod: 25

## 2020-12-27 PROCEDURE — 250N000011 HC RX IP 250 OP 636: Performed by: EMERGENCY MEDICINE

## 2020-12-27 PROCEDURE — 96375 TX/PRO/DX INJ NEW DRUG ADDON: CPT

## 2020-12-27 PROCEDURE — 96361 HYDRATE IV INFUSION ADD-ON: CPT

## 2020-12-27 PROCEDURE — 85025 COMPLETE CBC W/AUTO DIFF WBC: CPT | Performed by: EMERGENCY MEDICINE

## 2020-12-27 PROCEDURE — 258N000003 HC RX IP 258 OP 636: Performed by: EMERGENCY MEDICINE

## 2020-12-27 PROCEDURE — 250N000009 HC RX 250: Performed by: EMERGENCY MEDICINE

## 2020-12-27 PROCEDURE — 80053 COMPREHEN METABOLIC PANEL: CPT | Mod: 59 | Performed by: EMERGENCY MEDICINE

## 2020-12-27 PROCEDURE — 83690 ASSAY OF LIPASE: CPT | Mod: 91 | Performed by: EMERGENCY MEDICINE

## 2020-12-27 PROCEDURE — 80053 COMPREHEN METABOLIC PANEL: CPT | Mod: 91 | Performed by: EMERGENCY MEDICINE

## 2020-12-27 RX ORDER — METOCLOPRAMIDE HYDROCHLORIDE 5 MG/ML
10 INJECTION INTRAMUSCULAR; INTRAVENOUS ONCE
Status: COMPLETED | OUTPATIENT
Start: 2020-12-27 | End: 2020-12-27

## 2020-12-27 RX ORDER — ONDANSETRON 2 MG/ML
4 INJECTION INTRAMUSCULAR; INTRAVENOUS ONCE
Status: COMPLETED | OUTPATIENT
Start: 2020-12-27 | End: 2020-12-27

## 2020-12-27 RX ORDER — DIPHENHYDRAMINE HYDROCHLORIDE 50 MG/ML
25 INJECTION INTRAMUSCULAR; INTRAVENOUS ONCE
Status: COMPLETED | OUTPATIENT
Start: 2020-12-27 | End: 2020-12-27

## 2020-12-27 RX ORDER — DOXYLAMINE SUCCINATE AND PYRIDOXINE HYDROCHLORIDE, DELAYED RELEASE TABLETS 10 MG/10 MG 10; 10 MG/1; MG/1
1 TABLET, DELAYED RELEASE ORAL 2 TIMES DAILY
Qty: 20 TABLET | Refills: 0 | Status: SHIPPED | OUTPATIENT
Start: 2020-12-27

## 2020-12-27 RX ORDER — ONDANSETRON 4 MG/1
4 TABLET, ORALLY DISINTEGRATING ORAL EVERY 8 HOURS PRN
Qty: 10 TABLET | Refills: 0 | Status: SHIPPED | OUTPATIENT
Start: 2020-12-27 | End: 2020-12-30

## 2020-12-27 RX ORDER — METOCLOPRAMIDE 10 MG/1
10 TABLET ORAL 3 TIMES DAILY PRN
Qty: 10 TABLET | Refills: 0 | Status: SHIPPED | OUTPATIENT
Start: 2020-12-27

## 2020-12-27 RX ORDER — SODIUM CHLORIDE 9 MG/ML
INJECTION, SOLUTION INTRAVENOUS CONTINUOUS
Status: DISCONTINUED | OUTPATIENT
Start: 2020-12-27 | End: 2020-12-27 | Stop reason: HOSPADM

## 2020-12-27 RX ORDER — DIPHENHYDRAMINE HYDROCHLORIDE 50 MG/ML
50 INJECTION INTRAMUSCULAR; INTRAVENOUS ONCE
Status: COMPLETED | OUTPATIENT
Start: 2020-12-27 | End: 2020-12-27

## 2020-12-27 RX ADMIN — DIPHENHYDRAMINE HYDROCHLORIDE 50 MG: 50 INJECTION, SOLUTION INTRAMUSCULAR; INTRAVENOUS at 01:10

## 2020-12-27 RX ADMIN — DEXTROSE AND SODIUM CHLORIDE 1000 ML: 5; 450 INJECTION, SOLUTION INTRAVENOUS at 18:42

## 2020-12-27 RX ADMIN — SODIUM CHLORIDE 1000 ML: 9 INJECTION, SOLUTION INTRAVENOUS at 00:52

## 2020-12-27 RX ADMIN — METOCLOPRAMIDE HYDROCHLORIDE 10 MG: 5 INJECTION INTRAMUSCULAR; INTRAVENOUS at 01:10

## 2020-12-27 RX ADMIN — LIDOCAINE HYDROCHLORIDE 30 ML: 20 SOLUTION ORAL; TOPICAL at 20:43

## 2020-12-27 RX ADMIN — ONDANSETRON 4 MG: 2 INJECTION INTRAMUSCULAR; INTRAVENOUS at 17:15

## 2020-12-27 RX ADMIN — DIPHENHYDRAMINE HYDROCHLORIDE 25 MG: 50 INJECTION INTRAMUSCULAR; INTRAVENOUS at 17:21

## 2020-12-27 RX ADMIN — SODIUM CHLORIDE 1000 ML: 9 INJECTION, SOLUTION INTRAVENOUS at 17:15

## 2020-12-27 RX ADMIN — METOCLOPRAMIDE HYDROCHLORIDE 10 MG: 5 INJECTION INTRAMUSCULAR; INTRAVENOUS at 17:18

## 2020-12-27 ASSESSMENT — ENCOUNTER SYMPTOMS
DIARRHEA: 0
ABDOMINAL PAIN: 1
VOMITING: 1
NAUSEA: 1
DYSURIA: 0
NAUSEA: 1
ABDOMINAL PAIN: 1
VOMITING: 1

## 2020-12-27 NOTE — ED AVS SNAPSHOT
[facility] Emergency Dept  [address]  [facility] [address]  Phone: [phone]  Fax: [phone]                                    Qian Loo   MRN: [id number]    Department: [facility] Emergency Dept   Date of Visit: 12/27/2020           After Visit Summary Signature Page    I have received my discharge instructions, and my questions have been answered. I have discussed any challenges I see with this plan with the nurse or doctor.    ..........................................................................................................................................  Patient/Patient Representative Signature      ..........................................................................................................................................  Patient Representative Print Name and Relationship to Patient    ..................................................               ................................................  Date                                   Time    ..........................................................................................................................................  Reviewed by Signature/Title    ...................................................              ..............................................  Date                                               Time          22EPIC Rev 08/18

## 2020-12-27 NOTE — ED TRIAGE NOTES
Here for abdominal cramping associated with n/v. Is currently 14-15 weeks pregnant. Denies any vaginal bleeding or dysuria. Took reglan around 8pm, but not helping. Recently seen here on 12/20/20 for n/v. ABCs intact.

## 2020-12-27 NOTE — ED TRIAGE NOTES
Diffuse Abdominal pain since yesterday. Worse today. 14 weeks pregnant. Normal BM today. Nausea and vomiting

## 2020-12-27 NOTE — ED AVS SNAPSHOT
[facility] Emergency Dept  [address]  [facility] [address]  Phone: [phone]  Fax: [phone]                                    Qian Loo   MRN: [id number]    Department: [facility] Emergency Dept   Date of Visit: 12/27/2020           After Visit Summary Signature Page    I have received my discharge instructions, and my questions have been answered. I have discussed any challenges I see with this plan with the nurse or doctor.    ..........................................................................................................................................  Patient/Patient Representative Signature      ..........................................................................................................................................  Patient Representative Print Name and Relationship to Patient    ..................................................               ................................................  Date                                   Time    ..........................................................................................................................................  Reviewed by Signature/Title    ...................................................              ..............................................  Date                                               Time          22EPIC Rev 08/18

## 2020-12-27 NOTE — ED PROVIDER NOTES
History   Chief Complaint:  Abdominal Pain and Vomiting      HPI   Qian Loo is a 25 year old 14 week pregnant  female with history of hyperemesis and 5 ED visits for vomiting and abdominal pain in the past month, recently discharged on Reglan who presents today with abdominal pain and vomiting. She was seen in the ED last night for this and notes she felt fine after discharge but developed worse nausea, vomiting, and generalized abdominal pain today. She states she has been unable to keep anything down. She was referred to follow up with her OB Dr. Espana as she notes a history of similar vomiting with her prior pregnancies. She also notes some spotting at the beginning of her pregnancy but denies any currently. She denies diarrhea or previous abdominal surgeries. States she has marijuana in her system but has not used recently.     US OB 1st TRI Single TA from 2020:   IMPRESSION:  1. Single viable intrauterine pregnancy with an estimated gestational age of 11 weeks 4 days based on current ultrasound with corresponding ultrasound LAYO of 2021, within 2 days of clinical dating.   2. No perigestational hemorrhage.  Reading per radiology.    Review of Systems   Gastrointestinal: Positive for abdominal pain, nausea and vomiting. Negative for diarrhea.   Genitourinary: Negative for vaginal bleeding.   All other systems reviewed and are negative.        Allergies:  The patient has no known allergies.     Medications:  Unisom  Reglan  Zofran     Past Medical History:    Anxiety  Hyperemesis  Major depressive disorder  Suicide attempt by overdose  SI     Past Surgical History:    [place] teeth extraction     Social History:  Patient presents alone.  OB/GYN Dr. Espana   Drug use: Yes; cannabis    Physical Exam     Patient Vitals for the past 24 hrs:   BP Temp Temp src Pulse Resp SpO2   20 1915 -- -- -- -- -- 100 %   20 1900 94/66 -- -- 89 -- 100 %   20 1845 -- -- -- -- -- 100 %    12/27/20 1830 94/65 -- -- 83 -- 100 %   12/27/20 1815 -- -- -- -- -- 100 %   12/27/20 1800 97/64 -- -- 89 -- 100 %   12/27/20 1745 -- -- -- -- -- 100 %   12/27/20 1730 98/68 -- -- 94 -- 100 %   12/27/20 1715 95/71 -- -- -- -- --   12/27/20 1639 111/71 97.7  F (36.5  C) Temporal 102 24 95 %       Physical Exam  Vitals signs and nursing note reviewed.   Eyes:      General: No scleral icterus.     Extraocular Movements: Extraocular movements intact.   Cardiovascular:      Rate and Rhythm: Normal rate and regular rhythm.   Pulmonary:      Effort: Pulmonary effort is normal.      Breath sounds: Normal breath sounds.   Abdominal:      General: Abdomen is flat.      Tenderness: There is abdominal tenderness in the epigastric area.   Skin:     General: Skin is warm.      Capillary Refill: Capillary refill takes less than 2 seconds.   Neurological:      General: No focal deficit present.      Mental Status: She is alert.   Psychiatric:         Mood and Affect: Mood normal.           Emergency Department Course     Laboratory:    CBC: WBC: 15.2 (H), HGB: 12.5, PLT: 355  CMP: Glucose 151 (H), Potassium: 3.3 (L), Urea Nitrogen: 5 (L), o/w WNL (Creatinine: 0.40 (L))    Lipase: 105    Drug Abuse Screen Urine: positive for cannabinoids   UA: Glucose: >1000, Ketones: >150, Protein Albumin: 30, Squamous Epithelial: 3 (H), Mucous: Present, o/w Negative    Emergency Department Course:    Reviewed:  I reviewed the patient's nursing notes, vitals, past medical records, Care Everywhere.     Assessments:  1650    I evaluated the patient and performed an exam as above.    1831    I updated the patient on results.    2012 Rechecked patient and discussed plan of care.    Consults:   1705    I spoke with Dr. Espana of the OB/GYN service from [facility] regarding patient's presentation, findings, and plan of care.    Interventions:  1715 NS, 1 L, IV   Zofran, 4 mg, IV   1718 Reglan, 10 mg, IV  1721 Benadryl, 25 mg, IV  1842 Dextrose 5%  and 0.45% NaCl, 1L, IV  2043 GI Cocktail, 30 mL, IV    Disposition:  The patient was discharged to home.     Impression & Plan     Medical Decision Making:  Patient presents with ongoing vomiting in pregnancy patient is examination in the abdomen shows mild epigastric tenderness but no clinical concern for appendicitis.  Patient had no vaginal bleeding and has had ultrasounds documented IUP.  Patient's lab work showed no major signs of dehydration no signs of complication care was discussed with the on-call obstetrician gynecologist this patient is visited this emergency room now 5 times this month for similar issues.  Patient fails to use outpatient IV fluids due to her job and the use of the emergency room for IV hydration.  Patient is requesting ODT Reglan which was handwritten as a prescription as well as Zofran was offered as well as Diclegis.  Patient given to team 2 L of fluid in the emergency room was discharged in stable condition.      Diagnosis:    ICD-10-CM    1. Hyperemesis gravidarum  O21.0 Drug abuse screen 77 urine       Discharge Medications:  New Prescriptions    DOXYLAMINE-PYRIDOXINE 10-10 MG TBEC    Take 1 tablet by mouth 2 times daily    ONDANSETRON (ZOFRAN ODT) 4 MG ODT TAB    Take 1 tablet (4 mg) by mouth every 8 hours as needed for vomiting       Scribe Disclosure:  I, Ankita Houser, am serving as a scribe at 4:43 PM on 12/27/2020 to document services personally performed by Elliott Culp MD based on my observations and the provider's statements to me.      Elliott Culp MD  12/27/20 6862

## 2020-12-27 NOTE — ED NOTES
Pt reports resolution of sx and feeling improved and ready for discharge. Pt tolerating PO fluids well

## 2020-12-27 NOTE — ED NOTES
Pt verbalizes understanding of discharge plan, follow up and s/s to return to ER for. Pt discharged to lobby to await ride from significant other

## 2020-12-27 NOTE — LETTER
December 27, 2020      To Whom It May Concern:      Qian Loo was seen in our Emergency Department today, 12/27/20.  I expect her condition to improve over the next 2 days.  She may return to work/school when improved.    Sincerely,        Elliott Culp MD

## 2020-12-27 NOTE — ED PROVIDER NOTES
History   Chief Complaint:  Abdominal Pain       HPI   Qian Loo is a 25 year old female, 15 weeks gestation, who presents with nausea and vomiting. The patient reports abdominal pain and associated nausea and vomiting which have been present throughout her pregnancy but worsened today. She otherwise denies vaginal bleeding or dysuria. She describes her abdominal pain as cramping and states that it is primarily present when she is vomiting. The patient took Reglan around 2000, though states this has not helped. She is still taking the Macrobid prescribed to her last week for bacteriuria. Has no urinary symptoms or fever.    Review of Systems   Gastrointestinal: Positive for abdominal pain, nausea and vomiting.   Genitourinary: Negative for dysuria and vaginal bleeding.   All other systems reviewed and are negative.    Allergies:  The patient has no known allergies.     Medications:  Unisom  Reglan  Promethazine  Pyridoxine  Prenatal vitamin    Past Medical History:    Anxiety  Depression  Suicide attempt  Hyperemesis Gravidarum  Bulimia    Past Surgical History:    [place] teeth extraction      Physical Exam     Patient Vitals for the past 24 hrs:   BP Temp Temp src Pulse Resp SpO2   12/27/20 0040 (!) 130/91 98  F (36.7  C) Oral 111 18 95 %       Physical Exam  General: Sitting up in bed  Eyes:  The pupils are equal and round    Conjunctivae and sclerae are normal  ENT:    Wearing a mask  Neck:  Normal range of motion  CV:  Regular rate, regular rhythm    Skin warm and well perfused   Resp:  Non labored breathing on room air    No tachypnea    No cough heard  GI:  Abdomen is soft, there is no rigidity    No distension    No rebound tenderness     No abdominal tenderness  MS:  Normal muscular tone  Skin:  No rash or acute skin lesions noted  Neuro:   Awake, alert.      Speech is normal and fluent.    Face is symmetric.     Moves all extremities equally  Psych: Normal affect.  Appropriate interactions.      Emergency Department Course   Laboratory:  CBC: AWNL (WBC 10.7, HGB 12.8, )     CMP: Glucose 116 (H), Urea nitrogen 6 (L), Creatinine 0.40 (L), o/w WNL     Lipase: 98      Emergency Department Course:    Reviewed:    I reviewed the patient's nursing notes, vitals, past medical records, Care Everywhere.     Assessments:  0213 I performed an exam of the patient and obtained history, as documented above. Following interventions and discussion of her workup, the patient is feeling better and is comfortable with discharge at this time.    Interventions:  0052 NS 1 L IV Bolus    0110 Reglan 10 mg IV   Benadryl 50 mg IV    Disposition:  The patient was discharged to home.     Impression & Plan   Medical Decision Making:  Qian Loo is a 25 year old female who presents for evaluation of nausea and vomiting.  She is currently pregnant.  Nonetheless. I considered a broad differential diagnosis for this patient including viral gastroenteritis, food poisoning, bowel obstruction, intra-abdominal infection such as colitis, cholecystitis, UTI, pyelonephritis, appendicitis, etc.  There are no signs of worrisome intra-abdominal pathologies detected during the visit today.  The patient has a completely benign abdominal exam without rebound, guarding, or marked tenderness to palpation. Supportive outpatient management is therefore indicated.  Feels much improved after interventions in ED and tolerated oral intake. I believe all of her symptoms are at this point explained by the pregnancy and hyperemesis gravidarum.       Diagnosis:    ICD-10-CM    1. Nausea and vomiting during pregnancy  O21.9        Discharge Medications:  New Prescriptions    METOCLOPRAMIDE (REGLAN) 10 MG TABLET    Take 1 tablet (10 mg) by mouth 3 times daily as needed (nausea and vomiting)       Scribe Disclosure:  Alexandra MARTINEZ, am serving as a scribe at 2:13 AM on 12/27/2020 to document services personally performed by Mai Francois  MD [place] based on my observations and the provider's statements to me.     Alexandra Rios  December 27, 2020   [facility] EMERGENCY DEPARTMENT     Mai Francois MD  12/27/20 0569

## 2020-12-28 NOTE — DISCHARGE INSTRUCTIONS
Your lab work is normal today.  Please use Reglan and Diclegis for nausea if still nauseated you Zofran.  We have discussed your care with Dr. Espana today he is call his office tomorrow to discuss how to best manage this outside the hospital.  The emergency room is here to help you when we can.

## 2021-01-06 ENCOUNTER — HOME INFUSION (PRE-WILLOW HOME INFUSION) (OUTPATIENT)
Dept: PHARMACY | Facility: CLINIC | Age: 26
End: 2021-01-06

## 2021-01-07 NOTE — PROGRESS NOTES
This is a recent snapshot of the patient's [facility] medical record.  For current drug dose and complete information and questions, call [phone] or In Basket pool, [facility] home infusion ([id number])  CSN Number:  [id number]

## 2021-01-19 ENCOUNTER — PATIENT OUTREACH (OUTPATIENT)
Dept: NURSING | Facility: CLINIC | Age: 26
End: 2021-01-19
Payer: COMMERCIAL

## 2021-01-19 DIAGNOSIS — Z71.89 OTHER SPECIFIED COUNSELING: Primary | Chronic | ICD-10-CM

## 2021-01-19 NOTE — PROGRESS NOTES
Clinic Care Coordination Contact  Community Health Worker Initial Outreach  Spoke with patient     Chart Review: Referral from external discharge report. In Urgent Care on 1/16 for vomiting, abdomial pain, pregnant. [facility]    CHW introduced self and role with CC  Patient states that she is doing much better since being in the urgent care. She denies any outstanding questions or concerns at this time.   CHW inquired if patient is in need of any additional support or resources however patient declines this as well.   Patient states that she wishes to keep her PCP at [place].  CHW provided contact information and [facility] Instructed patient to reach out with any future needs or concerns. Patient acknowledges plan.     Patient accepts CC: No, patient declines CC support at this time. Patient will be sent Care Coordination introduction letter for future reference.     MADDIE Marie, B.S. [facility] Care Coordination  [facility] Clinics:  [place], [place] and [place]  Phone: [phone]

## 2021-01-19 NOTE — LETTER
[place] CARE COORDINATION  [facility]  January 19, 2021    Qian Loo  [address]   [place] [address]      Dear Qian,    I am a clinic community health worker who works with VALENTINO Macias CNP at the [facility]. I wanted to thank you for spending the time to talk with me.  Below is a description of clinic care coordination and how I can further assist you.      The clinic care coordination team is made up of a registered nurse,  and community health worker who understand the health care system. The goal of clinic care coordination is to help you manage your health and improve access to the health care system in the most efficient manner. The team can assist you in meeting your health care goals by providing education, coordinating services, strengthening the communication among your providers and supporting you with any resource needs.    Please feel free to contact me at [phone] with any questions or concerns. We are focused on providing you with the highest-quality healthcare experience possible and that all starts with you.     Sincerely,     Khushbu Fink, MADDIE, B.S. [facility]  Clinic Care Coordination  [facility]:  [place], [place] and [place]  Phone: [phone]

## 2021-09-19 ENCOUNTER — HEALTH MAINTENANCE LETTER (OUTPATIENT)
Age: 26
End: 2021-09-19

## 2022-01-08 ENCOUNTER — HEALTH MAINTENANCE LETTER (OUTPATIENT)
Age: 27
End: 2022-01-08

## 2022-05-20 ENCOUNTER — HOSPITAL ENCOUNTER (EMERGENCY)
Facility: CLINIC | Age: 27
Discharge: HOME | End: 2022-05-21
Attending: EMERGENCY MEDICINE | Admitting: EMERGENCY MEDICINE
Payer: COMMERCIAL

## 2022-05-20 DIAGNOSIS — O21.9 NAUSEA/VOMITING IN PREGNANCY: ICD-10-CM

## 2022-05-20 LAB
ANION GAP SERPL CALCULATED.3IONS-SCNC: 10 MMOL/L (ref 3–14)
B-HCG SERPL-ACNC: ABNORMAL IU/L (ref 0–5)
BUN SERPL-MCNC: 9 MG/DL (ref 7–30)
CALCIUM SERPL-MCNC: 9.5 MG/DL (ref 8.5–10.1)
CHLORIDE BLD-SCNC: 102 MMOL/L (ref 94–109)
CO2 SERPL-SCNC: 19 MMOL/L (ref 20–32)
CREAT SERPL-MCNC: 0.5 MG/DL (ref 0.52–1.04)
ERYTHROCYTE [DISTWIDTH] IN BLOOD BY AUTOMATED COUNT: 12.6 % (ref 10–15)
GFR SERPL CREATININE-BSD FRML MDRD: >90 ML/MIN/1.73M2
GLUCOSE BLD-MCNC: 144 MG/DL (ref 70–99)
HCT VFR BLD AUTO: 40.6 % (ref 35–47)
HGB BLD-MCNC: 14 G/DL (ref 11.7–15.7)
MCH RBC QN AUTO: 31.1 PG (ref 26.5–33)
MCHC RBC AUTO-ENTMCNC: 34.5 G/DL (ref 31.5–36.5)
MCV RBC AUTO: 90 FL (ref 78–100)
PLATELET # BLD AUTO: 326 10E3/UL (ref 150–450)
POTASSIUM BLD-SCNC: 3.4 MMOL/L (ref 3.4–5.3)
RBC # BLD AUTO: 4.5 10E6/UL (ref 3.8–5.2)
SODIUM SERPL-SCNC: 131 MMOL/L (ref 133–144)
WBC # BLD AUTO: 10.7 10E3/UL (ref 4–11)

## 2022-05-20 PROCEDURE — 258N000003 HC RX IP 258 OP 636: Performed by: EMERGENCY MEDICINE

## 2022-05-20 PROCEDURE — 96374 THER/PROPH/DIAG INJ IV PUSH: CPT

## 2022-05-20 PROCEDURE — 250N000011 HC RX IP 250 OP 636: Performed by: EMERGENCY MEDICINE

## 2022-05-20 PROCEDURE — 96375 TX/PRO/DX INJ NEW DRUG ADDON: CPT

## 2022-05-20 PROCEDURE — 85027 COMPLETE CBC AUTOMATED: CPT | Performed by: EMERGENCY MEDICINE

## 2022-05-20 PROCEDURE — 84702 CHORIONIC GONADOTROPIN TEST: CPT | Performed by: EMERGENCY MEDICINE

## 2022-05-20 PROCEDURE — 96361 HYDRATE IV INFUSION ADD-ON: CPT

## 2022-05-20 PROCEDURE — 80048 BASIC METABOLIC PNL TOTAL CA: CPT | Performed by: EMERGENCY MEDICINE

## 2022-05-20 PROCEDURE — 99285 EMERGENCY DEPT VISIT HI MDM: CPT | Mod: 25

## 2022-05-20 PROCEDURE — 36415 COLL VENOUS BLD VENIPUNCTURE: CPT | Performed by: EMERGENCY MEDICINE

## 2022-05-20 PROCEDURE — 250N000013 HC RX MED GY IP 250 OP 250 PS 637: Performed by: EMERGENCY MEDICINE

## 2022-05-20 RX ORDER — ONDANSETRON 2 MG/ML
4 INJECTION INTRAMUSCULAR; INTRAVENOUS ONCE
Status: COMPLETED | OUTPATIENT
Start: 2022-05-20 | End: 2022-05-20

## 2022-05-20 RX ORDER — METOCLOPRAMIDE HYDROCHLORIDE 5 MG/ML
5 INJECTION INTRAMUSCULAR; INTRAVENOUS ONCE
Status: COMPLETED | OUTPATIENT
Start: 2022-05-20 | End: 2022-05-20

## 2022-05-20 RX ORDER — ACETAMINOPHEN 500 MG
1000 TABLET ORAL ONCE
Status: COMPLETED | OUTPATIENT
Start: 2022-05-20 | End: 2022-05-20

## 2022-05-20 RX ORDER — KETOROLAC TROMETHAMINE 15 MG/ML
15 INJECTION, SOLUTION INTRAMUSCULAR; INTRAVENOUS ONCE
Status: COMPLETED | OUTPATIENT
Start: 2022-05-20 | End: 2022-05-20

## 2022-05-20 RX ADMIN — METOCLOPRAMIDE HYDROCHLORIDE 5 MG: 5 INJECTION INTRAMUSCULAR; INTRAVENOUS at 23:23

## 2022-05-20 RX ADMIN — SODIUM CHLORIDE 1000 ML: 9 INJECTION, SOLUTION INTRAVENOUS at 23:23

## 2022-05-20 RX ADMIN — ACETAMINOPHEN 1000 MG: 500 TABLET, FILM COATED ORAL at 23:23

## 2022-05-20 RX ADMIN — KETOROLAC TROMETHAMINE 15 MG: 15 INJECTION, SOLUTION INTRAMUSCULAR; INTRAVENOUS at 23:22

## 2022-05-20 RX ADMIN — SODIUM CHLORIDE 1000 ML: 9 INJECTION, SOLUTION INTRAVENOUS at 21:23

## 2022-05-20 RX ADMIN — ONDANSETRON 4 MG: 2 INJECTION INTRAMUSCULAR; INTRAVENOUS at 21:23

## 2022-05-21 ENCOUNTER — APPOINTMENT (OUTPATIENT)
Dept: ULTRASOUND IMAGING | Facility: CLINIC | Age: 27
End: 2022-05-21
Attending: EMERGENCY MEDICINE
Payer: COMMERCIAL

## 2022-05-21 VITALS
RESPIRATION RATE: 18 BRPM | HEART RATE: 92 BPM | SYSTOLIC BLOOD PRESSURE: 107 MMHG | OXYGEN SATURATION: 100 % | BODY MASS INDEX: 17.71 KG/M2 | DIASTOLIC BLOOD PRESSURE: 69 MMHG | TEMPERATURE: 97 F | WEIGHT: 100 LBS

## 2022-05-21 LAB
ALBUMIN UR-MCNC: 20 MG/DL
APPEARANCE UR: ABNORMAL
BILIRUB UR QL STRIP: NEGATIVE
COLOR UR AUTO: ABNORMAL
GLUCOSE UR STRIP-MCNC: 500 MG/DL
HGB UR QL STRIP: NEGATIVE
HOLD SPECIMEN: NORMAL
HOLD SPECIMEN: NORMAL
KETONES UR STRIP-MCNC: >150 MG/DL
LEUKOCYTE ESTERASE UR QL STRIP: NEGATIVE
MUCOUS THREADS #/AREA URNS LPF: PRESENT /LPF
NITRATE UR QL: NEGATIVE
PH UR STRIP: 6 [PH] (ref 5–7)
RBC URINE: 1 /HPF
SP GR UR STRIP: 1.02 (ref 1–1.03)
SQUAMOUS EPITHELIAL: 14 /HPF
UROBILINOGEN UR STRIP-MCNC: NORMAL MG/DL
WBC URINE: 2 /HPF

## 2022-05-21 PROCEDURE — 96376 TX/PRO/DX INJ SAME DRUG ADON: CPT

## 2022-05-21 PROCEDURE — 76801 OB US < 14 WKS SINGLE FETUS: CPT

## 2022-05-21 PROCEDURE — 250N000011 HC RX IP 250 OP 636: Performed by: EMERGENCY MEDICINE

## 2022-05-21 PROCEDURE — 81001 URINALYSIS AUTO W/SCOPE: CPT | Performed by: EMERGENCY MEDICINE

## 2022-05-21 RX ORDER — ONDANSETRON 2 MG/ML
4 INJECTION INTRAMUSCULAR; INTRAVENOUS ONCE
Status: DISCONTINUED | OUTPATIENT
Start: 2022-05-21 | End: 2022-05-21

## 2022-05-21 RX ORDER — METOCLOPRAMIDE HYDROCHLORIDE 5 MG/ML
5 INJECTION INTRAMUSCULAR; INTRAVENOUS ONCE
Status: COMPLETED | OUTPATIENT
Start: 2022-05-21 | End: 2022-05-21

## 2022-05-21 RX ORDER — METOCLOPRAMIDE 10 MG/1
10 TABLET ORAL 4 TIMES DAILY PRN
Qty: 20 TABLET | Refills: 0 | Status: SHIPPED | OUTPATIENT
Start: 2022-05-21

## 2022-05-21 RX ADMIN — METOCLOPRAMIDE HYDROCHLORIDE 5 MG: 5 INJECTION INTRAMUSCULAR; INTRAVENOUS at 00:50

## 2022-05-21 ASSESSMENT — ENCOUNTER SYMPTOMS
NAUSEA: 1
FEVER: 0
HEMATURIA: 0
VOMITING: 1
DYSURIA: 0
ABDOMINAL PAIN: 1

## 2022-05-21 NOTE — DISCHARGE INSTRUCTIONS
The pregnancy is in the right location so there is no danger here.  Use Tylenol or ibuprofen as needed for pain.  Use Reglan for nausea and vomiting.  Keep your appointment as scheduled.  Return if you have any new concerns or if you cannot keep anything down because we do not want you getting dehydrated.

## 2022-05-21 NOTE — ED TRIAGE NOTES
"Pt arrives from home w/ complaint of N/V beginning this morning. Reports being around 7.5 wks pregnant. Pt reports some abd cramping that she reports feels related to her vomiting and does not feel like menstrual cramping, reports some spotting two days ago but denies any vaginal bleeding now. During triage pt asked \"do you have the  pill here\". Pt is A&O x 4.       "

## 2022-05-21 NOTE — ED PROVIDER NOTES
"  History     Chief Complaint:  Nausea, vomiting, and abdominal pain    The history is provided by the patient.      Qian Loo is a 26 year old is a  at 7 weeks 5 days by stated LMP presenting with nausea, vomiting, and abdominal pain.  She is awaiting an appointment at [facility] for termination of this pregnancy in 3 days.  She has been nauseated for the entirety of this pregnancy and with her other 2 pregnancies and presents because she could not keep anything down.  She denies hematemesis, fever, dysuria, or hematuria.  She does have diffuse lower abdominal and lower back pain/cramping.  She had some spotting a couple of days ago but this has resolved.  She wonders if we can help her with termination of this pregnancy because she \"know[s] it [the nausea] will not get any better\" until the pregnancy has been terminated.    Review of Systems   Constitutional: Negative for fever.   Gastrointestinal: Positive for abdominal pain, nausea and vomiting.   Genitourinary: Positive for pelvic pain and vaginal bleeding (resolved). Negative for dysuria and hematuria.   All other systems reviewed and are negative.    Allergies:  No Known Drug Allergies  Seasonal Allergies      Medications:    metoclopramide (REGLAN) 10 MG tablet  capsaicin (ZOSTRIX) 0.075 % cream  Doxylamine-Pyridoxine 10-10 MG TBEC  etonogestrel (IMPLANON/NEXPLANON) 68 MG IMPL  metoclopramide (REGLAN) 10 MG tablet  ondansetron (ZOFRAN ODT) 4 MG ODT tab  potassium chloride ER (K-TAB) 20 MEQ CR tablet  Prenatal Vit-Fe Fumarate-FA (PRENATAL VITAMIN) 27-0.8 MG TABS  promethazine (PHENERGAN) 25 MG suppository    Past Medical History:    Past Medical History:   Diagnosis Date     Anxiety      Hyperemesis      MDD (major depressive disorder)      Patient Active Problem List    Diagnosis Date Noted     Suicide attempt by drug ingestion (H) 2019     Priority: Medium     Suicidal ideations 11/10/2019     Priority: Medium     Other acne " 03/26/2008     Priority: Medium     Allergic rhinitis 05/14/2007     Priority: Medium        Family History:    Unknown/Adopted    Social History:  PCP: Lora Garcia  The patient presents to the ED alone    Physical Exam     Patient Vitals for the past 24 hrs:   BP Temp Temp src Pulse Resp SpO2 Weight   05/21/22 0110 107/69 -- -- -- -- -- --   05/20/22 2057 122/82 -- -- -- -- -- 45.4 kg (100 lb)   05/20/22 2055 -- 97  F (36.1  C) Temporal 92 18 100 % --       Physical Exam  General: Well-developed and well-nourished. Uncomfortable appearing young woman. Cooperative.  Head:  Atraumatic.  Eyes:  Conjunctivae, lids, and sclerae are normal.  Neck:  Supple. Normal range of motion.  CV:  Regular rate and rhythm. Normal heart sounds with no murmurs, rubs, or gallops detected.  Resp:  No respiratory distress. Clear to auscultation bilaterally without decreased breath sounds, wheezing, rales, or rhonchi.  GI:  Soft. Non-distended. Non-tender.    MS:  Normal ROM.   Skin:  Warm. Non-diaphoretic. No pallor.  Neuro:  Awake. A&Ox3. Normal strength.  Psych: Normal mood and affect. Normal speech.  Vitals reviewed.    Emergency Department Course   Imaging:  US OB < 14 Weeks Single   Final Result   IMPRESSION:    1.  Single living intrauterine gestation at 7 weeks 6 days, EDC 01/01/2023.              Laboratory:  Labs Ordered and Resulted from Time of ED Arrival to Time of ED Departure   BASIC METABOLIC PANEL - Abnormal       Result Value    Sodium 131 (*)     Potassium 3.4      Chloride 102      Carbon Dioxide (CO2) 19 (*)     Anion Gap 10      Urea Nitrogen 9      Creatinine 0.50 (*)     Calcium 9.5      Glucose 144 (*)     GFR Estimate >90     HCG QUANTITATIVE PREGNANCY - Abnormal    hCG Quantitative 165,293 (*)    ROUTINE UA WITH MICROSCOPIC REFLEX TO CULTURE - Abnormal    Color Urine Light Yellow      Appearance Urine Slightly Cloudy (*)     Glucose Urine 500  (*)     Bilirubin Urine Negative      Ketones Urine >150  "(*)     Specific Gravity Urine 1.022      Blood Urine Negative      pH Urine 6.0      Protein Albumin Urine 20  (*)     Urobilinogen Urine Normal      Nitrite Urine Negative      Leukocyte Esterase Urine Negative      Mucus Urine Present (*)     RBC Urine 1      WBC Urine 2      Squamous Epithelials Urine 14 (*)    CBC WITH PLATELETS - Normal    WBC Count 10.7      RBC Count 4.50      Hemoglobin 14.0      Hematocrit 40.6      MCV 90      MCH 31.1      MCHC 34.5      RDW 12.6      Platelet Count 326       Emergency Department Course:  Reviewed:  I reviewed nursing notes, vitals, and past medical history.    Assessments:   I obtained history and examined the patient as noted above.   0100 I rechecked the patient and explained findings. She has tolerated water.     Interventions:  Medications   ondansetron (ZOFRAN) injection 4 mg (4 mg Intravenous Given 22)   0.9% sodium chloride BOLUS (1000 mLs Intravenous Stopped 22)   metoclopramide (REGLAN) injection 5 mg (5 mg Intravenous Given 22)   ketorolac (TORADOL) injection 15 mg (15 mg Intravenous Given 222)   acetaminophen (TYLENOL) tablet 1,000 mg (1,000 mg Oral Given 22)   0.9% sodium chloride BOLUS (1000 mLs Intravenous Stopped 22)   metoclopramide (REGLAN) injection 5 mg (5 mg Intravenous Given 22 0050)     Disposition:  Discharged.    Impression & Plan    Medical Decision Making:  Qian is a 26 year old  at 7w5d by LMP presenting with abdominal pain and vomiting similar to that she has had with prior pregnancies. She is requesting the \" pill\" because she will not feel improved until the pregnancy is terminated. She does appear uncomfortable on exam. Her abdomen is nontender but I did send her for pelvic US to confirm IUP rather than ectopic. This confirms IUP, appropriate for gestational age. There is no UTI but ketonuria is noted. There is mild hyponatremia to 131 with bicarb on " likely related to starvation ketosis - without kidney injury, leukocytosis, or anemia. I would expect her abnormal labs to improve with IV fluids and she was given 2L. She was also given Toradol and Tylenol for pain and Zofran and Reglan for nausea and vomiting. Overall she felt improved, finding Reglan more beneficial than Zofran,though she did have persistent symptoms. She is appropriate for discharge with Reglan as needed for nausea and vomiting and Tylenol or Motrin as needed for pain at home with plan to follow up with [facility] as scheduled. I provided return precautions including those for dehydration and answered all her questions. She verbalized understanding and is amenable to discharge.     Covid-19  Qian Loo was evaluated during a global COVID-19 pandemic, which necessitated consideration that the patient might be at risk for infection with the SARS-CoV-2 virus that causes COVID-19.   Applicable protocols for evaluation were followed during the patient's care.     Diagnosis:    ICD-10-CM    1. Nausea/vomiting in pregnancy  O21.9        Discharge Medications:  Discharge Medication List as of 5/21/2022  1:09 AM      START taking these medications    Details   !! metoclopramide (REGLAN) 10 MG tablet Take 1 tablet (10 mg) by mouth 4 times daily as needed (Nausea or Vomiting), Disp-20 tablet, R-0, Local Print       !! - Potential duplicate medications found. Please discuss with provider.           Esme Pizano MD  05/25/22 2022

## 2022-11-20 ENCOUNTER — HEALTH MAINTENANCE LETTER (OUTPATIENT)
Age: 27
End: 2022-11-20

## 2023-04-15 ENCOUNTER — HEALTH MAINTENANCE LETTER (OUTPATIENT)
Age: 28
End: 2023-04-15

## 2024-06-22 ENCOUNTER — HEALTH MAINTENANCE LETTER (OUTPATIENT)
Age: 29
End: 2024-06-22

## (undated) RX ORDER — ONDANSETRON 2 MG/ML
INJECTION INTRAMUSCULAR; INTRAVENOUS
Status: DISPENSED
Start: 2019-10-26